# Patient Record
Sex: FEMALE | Race: WHITE | Employment: OTHER | URBAN - NONMETROPOLITAN AREA
[De-identification: names, ages, dates, MRNs, and addresses within clinical notes are randomized per-mention and may not be internally consistent; named-entity substitution may affect disease eponyms.]

---

## 2017-01-09 RX ORDER — ATORVASTATIN CALCIUM 40 MG/1
TABLET, FILM COATED ORAL
Qty: 90 TABLET | Refills: 3 | Status: SHIPPED | OUTPATIENT
Start: 2017-01-09 | End: 2018-01-04 | Stop reason: SDUPTHER

## 2017-04-18 ENCOUNTER — TELEPHONE (OUTPATIENT)
Dept: FAMILY MEDICINE CLINIC | Age: 77
End: 2017-04-18

## 2017-04-18 ENCOUNTER — OFFICE VISIT (OUTPATIENT)
Dept: FAMILY MEDICINE CLINIC | Age: 77
End: 2017-04-18

## 2017-04-18 VITALS
BODY MASS INDEX: 33.54 KG/M2 | WEIGHT: 159.8 LBS | HEIGHT: 58 IN | SYSTOLIC BLOOD PRESSURE: 112 MMHG | RESPIRATION RATE: 14 BRPM | HEART RATE: 52 BPM | TEMPERATURE: 98 F | DIASTOLIC BLOOD PRESSURE: 68 MMHG

## 2017-04-18 DIAGNOSIS — Z01.818 PRE-OP EXAM: Primary | ICD-10-CM

## 2017-04-18 DIAGNOSIS — R00.1 BRADYCARDIA: ICD-10-CM

## 2017-04-18 DIAGNOSIS — E03.8 OTHER SPECIFIED HYPOTHYROIDISM: ICD-10-CM

## 2017-04-18 DIAGNOSIS — M17.12 PRIMARY OSTEOARTHRITIS OF LEFT KNEE: ICD-10-CM

## 2017-04-18 DIAGNOSIS — E78.5 HYPERLIPIDEMIA, UNSPECIFIED HYPERLIPIDEMIA TYPE: ICD-10-CM

## 2017-04-18 PROCEDURE — 99213 OFFICE O/P EST LOW 20 MIN: CPT | Performed by: FAMILY MEDICINE

## 2017-04-18 PROCEDURE — 93000 ELECTROCARDIOGRAM COMPLETE: CPT | Performed by: FAMILY MEDICINE

## 2017-04-21 ENCOUNTER — TELEPHONE (OUTPATIENT)
Dept: FAMILY MEDICINE CLINIC | Age: 77
End: 2017-04-21

## 2017-04-21 LAB
ABSOLUTE BASO #: 0.1 K/UL (ref 0–0.1)
ABSOLUTE EOS #: 0.1 K/UL (ref 0.1–0.4)
ABSOLUTE LYMPH #: 2 K/UL (ref 0.8–5.2)
ABSOLUTE MONO #: 0.3 K/UL (ref 0.1–0.9)
ABSOLUTE NEUT #: 4.7 K/UL (ref 1.3–9.1)
ANION GAP SERPL CALCULATED.3IONS-SCNC: 16 MMOL/L
BASOPHILS RELATIVE PERCENT: 0.7 %
BUN BLDV-MCNC: 14 MG/DL (ref 9–24)
C-REACTIVE PROTEIN: 8.71 MG/L
CALCIUM SERPL-MCNC: 8.6 MG/DL (ref 8.7–10.8)
CHLORIDE BLD-SCNC: 105 MMOL/L (ref 95–111)
CO2: 20 MMOL/L (ref 21–32)
CREAT SERPL-MCNC: 0.8 MG/DL (ref 0.5–1.3)
EGFR AFRICAN AMERICAN: 85
EGFR IF NONAFRICAN AMERICAN: 70
EOSINOPHILS RELATIVE PERCENT: 1.8 %
GLUCOSE: 153 MG/DL (ref 70–100)
HCT VFR BLD CALC: 37 % (ref 36–48)
HEMOGLOBIN: 12.2 G/DL (ref 12–16)
LYMPHOCYTE %: 28.1 %
MCH RBC QN AUTO: 32.4 PG (ref 27–34)
MCHC RBC AUTO-ENTMCNC: 33 G/DL (ref 31–36)
MCV RBC AUTO: 98.1 FL (ref 80–100)
MONOCYTES # BLD: 4.4 %
NEUTROPHILS RELATIVE PERCENT: 64.6 %
PDW BLD-RTO: 13.1 % (ref 10.8–14.8)
PLATELETS: 323 K/UL (ref 150–450)
POTASSIUM SERPL-SCNC: 3.8 MMOL/L (ref 3.5–5.4)
RBC: 3.77 M/UL (ref 4–5.5)
SEDIMENTATION RATE, ERYTHROCYTE: 20 MM/HR (ref 0–20)
SODIUM BLD-SCNC: 137 MMOL/L (ref 134–147)
WBC: 7.3 K/UL (ref 3.7–10.8)

## 2017-04-24 ENCOUNTER — TELEPHONE (OUTPATIENT)
Dept: FAMILY MEDICINE CLINIC | Age: 77
End: 2017-04-24

## 2017-04-24 DIAGNOSIS — I10 ESSENTIAL HYPERTENSION: Primary | ICD-10-CM

## 2017-05-04 ENCOUNTER — TELEPHONE (OUTPATIENT)
Dept: FAMILY MEDICINE CLINIC | Age: 77
End: 2017-05-04

## 2017-07-18 DIAGNOSIS — R20.2 PARESTHESIAS: ICD-10-CM

## 2017-07-18 RX ORDER — PREGABALIN 75 MG/1
75 CAPSULE ORAL 3 TIMES DAILY
Qty: 90 CAPSULE | Status: CANCELLED | OUTPATIENT
Start: 2017-07-18

## 2017-07-18 RX ORDER — PREGABALIN 75 MG/1
75 CAPSULE ORAL 3 TIMES DAILY
Qty: 270 CAPSULE | Refills: 1 | Status: SHIPPED | OUTPATIENT
Start: 2017-07-18 | End: 2017-07-19 | Stop reason: SDUPTHER

## 2017-07-19 RX ORDER — PREGABALIN 75 MG/1
75 CAPSULE ORAL 3 TIMES DAILY
Qty: 90 CAPSULE | Refills: 0 | Status: SHIPPED | OUTPATIENT
Start: 2017-07-19 | End: 2017-08-01

## 2017-08-01 ENCOUNTER — TELEPHONE (OUTPATIENT)
Dept: FAMILY MEDICINE CLINIC | Age: 77
End: 2017-08-01

## 2017-08-01 DIAGNOSIS — M79.7 FIBROMYALGIA: Primary | ICD-10-CM

## 2017-08-01 RX ORDER — GABAPENTIN 300 MG/1
CAPSULE ORAL
Qty: 90 CAPSULE | Refills: 3 | Status: SHIPPED | OUTPATIENT
Start: 2017-08-01 | End: 2017-12-19 | Stop reason: SDUPTHER

## 2017-08-14 DIAGNOSIS — N39.41 URGE INCONTINENCE: ICD-10-CM

## 2017-08-14 RX ORDER — OXYBUTYNIN CHLORIDE 10 MG/1
TABLET, EXTENDED RELEASE ORAL
Qty: 90 TABLET | Refills: 3 | Status: SHIPPED | OUTPATIENT
Start: 2017-08-14 | End: 2017-10-06

## 2017-09-05 RX ORDER — POTASSIUM CHLORIDE 20 MEQ/1
TABLET, EXTENDED RELEASE ORAL
Qty: 90 TABLET | Refills: 2 | Status: SHIPPED | OUTPATIENT
Start: 2017-09-05 | End: 2017-10-06

## 2017-10-06 ENCOUNTER — HOSPITAL ENCOUNTER (EMERGENCY)
Age: 77
Discharge: HOME | End: 2017-10-06
Attending: EMERGENCY MEDICINE
Payer: MEDICARE

## 2017-10-06 VITALS
SYSTOLIC BLOOD PRESSURE: 167 MMHG | RESPIRATION RATE: 18 BRPM | OXYGEN SATURATION: 99 % | DIASTOLIC BLOOD PRESSURE: 71 MMHG | BODY MASS INDEX: 29.43 KG/M2 | TEMPERATURE: 97.8 F | HEIGHT: 59 IN | WEIGHT: 146 LBS | HEART RATE: 59 BPM

## 2017-10-06 DIAGNOSIS — N30.01 ACUTE CYSTITIS WITH HEMATURIA: Primary | ICD-10-CM

## 2017-10-06 LAB
BILIRUBIN URINE: NEGATIVE
BLOOD, URINE: ABNORMAL
CHARACTER, URINE: ABNORMAL
COLOR: YELLOW
GLUCOSE, URINE: NEGATIVE MG/DL
KETONES, URINE: NEGATIVE
LEUKOCYTES, UA: ABNORMAL
NITRATE, UA: POSITIVE
PH UA: 5.5 (ref 5–9)
PROTEIN UA: ABNORMAL MG/DL
REFLEX TO URINE C & S: ABNORMAL
SPECIFIC GRAVITY UA: 1.02 (ref 1–1.03)
UROBILINOGEN, URINE: 0.2 EU/DL (ref 0–1)

## 2017-10-06 PROCEDURE — 99213 OFFICE O/P EST LOW 20 MIN: CPT | Performed by: EMERGENCY MEDICINE

## 2017-10-06 PROCEDURE — 81003 URINALYSIS AUTO W/O SCOPE: CPT

## 2017-10-06 PROCEDURE — 87086 URINE CULTURE/COLONY COUNT: CPT

## 2017-10-06 PROCEDURE — 87186 SC STD MICRODIL/AGAR DIL: CPT

## 2017-10-06 PROCEDURE — 99213 OFFICE O/P EST LOW 20 MIN: CPT

## 2017-10-06 PROCEDURE — 87184 SC STD DISK METHOD PER PLATE: CPT

## 2017-10-06 PROCEDURE — 87077 CULTURE AEROBIC IDENTIFY: CPT

## 2017-10-06 RX ORDER — CIPROFLOXACIN 250 MG/1
250 TABLET, FILM COATED ORAL 2 TIMES DAILY
Qty: 10 TABLET | Refills: 0 | Status: SHIPPED | OUTPATIENT
Start: 2017-10-06 | End: 2017-10-11

## 2017-10-06 RX ORDER — PHENAZOPYRIDINE HYDROCHLORIDE 200 MG/1
200 TABLET, FILM COATED ORAL 3 TIMES DAILY PRN
Qty: 9 TABLET | Refills: 0 | Status: ON HOLD | OUTPATIENT
Start: 2017-10-06 | End: 2017-12-05

## 2017-10-06 ASSESSMENT — ENCOUNTER SYMPTOMS
EYE REDNESS: 0
TROUBLE SWALLOWING: 0
SHORTNESS OF BREATH: 0
DIARRHEA: 0
ABDOMINAL PAIN: 1
SINUS PRESSURE: 0
WHEEZING: 0
VOICE CHANGE: 0
EYE PAIN: 0
BACK PAIN: 0
COUGH: 0
EYE DISCHARGE: 0
VOMITING: 0
STRIDOR: 0
NAUSEA: 0
SORE THROAT: 0

## 2017-10-06 NOTE — ED TRIAGE NOTES
Patient to urgent care with burning with urination since yesterday. Denies N/V. Denies fevers or chills.

## 2017-10-06 NOTE — ED PROVIDER NOTES
[facility]  Urgent Care Encounter      CHIEF COMPLAINT       Chief Complaint   Patient presents with    Dysuria     since yesterday       Nurses Notes reviewed and I agree except as noted in the HPI. HISTORY OF PRESENT ILLNESS   Lexy Jesus is a 78 y.o. female who presents With 24-hour history of dysuria, urinary frequency, urgency, suprapubic pressure. Patient states symptoms are identical to previous cystitis. No fever, vomiting, hematuria, back pain, dizziness, syncope, altered mental status, lethargy, rash. No history of diabetes, pyelonephritis, urosepsis, AAA. Recent GFR of 70. REVIEW OF SYSTEMS     Review of Systems   Constitutional: Negative for appetite change, chills, fatigue, fever and unexpected weight change. HENT: Negative for congestion, ear discharge, ear pain, sinus pressure, sneezing, sore throat, trouble swallowing and voice change. Eyes: Negative for pain, discharge and redness. Respiratory: Negative for cough, shortness of breath, wheezing and stridor. Cardiovascular: Negative for chest pain and leg swelling. Gastrointestinal: Positive for abdominal pain. Negative for diarrhea, nausea and vomiting. Genitourinary: Positive for dysuria, frequency and urgency. Negative for hematuria. Musculoskeletal: Negative for arthralgias, back pain, myalgias and neck pain. Skin: Negative for rash. Neurological: Negative for dizziness, syncope, weakness and headaches. Hematological: Negative for adenopathy. Psychiatric/Behavioral: Negative for behavioral problems, confusion, sleep disturbance and suicidal ideas. The patient is not nervous/anxious. All other systems reviewed and are negative.       PAST MEDICAL HISTORY         Diagnosis Date    Anxiety     Arthritis     Bleeding disorder ([address]     CAD (coronary artery disease)     Stent in 1st diagonal in 2007    Cataract     Celiac disease     Dementia     Depression     Fibromyalgia     never smoked. She has never used smokeless tobacco. She reports that she does not drink alcohol or use illicit drugs. PHYSICAL EXAM     ED TRIAGE VITALS  BP: (!) 167/71, Temp: 97.8 °F (36.6 °C), Pulse: 59, Resp: 18, SpO2: 99 %  Physical Exam   Constitutional: She is oriented to person, place, and time. She appears well-developed and well-nourished. No distress. Moist membranes, normal airway   HENT:   Head: Normocephalic and atraumatic. Right Ear: Tympanic membrane and external ear normal.   Left Ear: Tympanic membrane and external ear normal.   Nose: Nose normal.   Mouth/Throat: Oropharynx is clear and moist. No uvula swelling. No oropharyngeal exudate, posterior oropharyngeal edema, posterior oropharyngeal erythema or tonsillar abscesses. Eyes: Conjunctivae and EOM are normal. Pupils are equal, round, and reactive to light. Right eye exhibits no discharge. Left eye exhibits no discharge. No scleral icterus. Neck: Normal range of motion. No JVD present. No thyromegaly present. No meningismus   Cardiovascular: Normal rate, regular rhythm, S1 normal, S2 normal, normal heart sounds, intact distal pulses and normal pulses. Exam reveals no gallop and no friction rub. No murmur heard. Pulmonary/Chest: Effort normal and breath sounds normal. No stridor. No tachypnea. No respiratory distress. She has no decreased breath sounds. She has no wheezes. She has no rhonchi. She has no rales. She exhibits no tenderness. No cough, lungs clear   Abdominal: Soft. Bowel sounds are normal. She exhibits no distension, no abdominal bruit, no pulsatile midline mass and no mass. There is no tenderness. There is no rebound, no guarding and no CVA tenderness. Musculoskeletal: Normal range of motion. She exhibits no edema or tenderness. Right lower leg: Normal.        Left lower leg: Normal.   Lymphadenopathy:     She has cervical adenopathy. Right cervical: Superficial cervical adenopathy present. Patient to follow-up with PCP in 4 days if problems persist, and she is to call PCP office in 4 days for culture results even if improved. She understands to go to ED if worse. PATIENT REFERRED TO:  DO Tim Zeng   [phone]    Schedule an appointment as soon as possible for a visit in 4 days  Recheck in office if problems persist, call for culture results in 4 days.   Go to emergency if worse    DISCHARGE MEDICATIONS:  Discharge Medication List as of 10/6/2017  1:58 PM      START taking these medications    Details   ciprofloxacin (CIPRO) 250 MG tablet Take 1 tablet by mouth 2 times daily for 5 days, Disp-10 tablet, R-0Print      phenazopyridine (PYRIDIUM) 200 MG tablet Take 1 tablet by mouth 3 times daily as needed for Pain, Disp-9 tablet, R-0Print           Discharge Medication List as of 10/6/2017  1:58 PM          MD Selam Anderson MD  10/06/17 8174

## 2017-10-06 NOTE — ED AVS SNAPSHOT
After Visit Summary  (Discharge Instructions)    Medication List for Home    Based on the information you provided to us as well as any changes during this visit, the following is your updated medication list.  Compare this with your prescription bottles at home. If you have any questions or concerns, contact your primary care physician's office. Daily Medication List (This medication list can be shared with any Healthcare provider who is helping you manage your medications)      There are NEW medications for you. START taking them after you leave the hospital     ciprofloxacin 250 MG tablet   Commonly known as:  CIPRO   Take 1 tablet by mouth 2 times daily for 5 days       phenazopyridine 200 MG tablet   Commonly known as:  PYRIDIUM   Take 1 tablet by mouth 3 times daily as needed for Pain         ASK your doctor about these medications if you have questions     Acetaminophen 650 MG Tabs   Take 650 mg by mouth every 4 hours as needed. aspirin 81 MG EC tablet   Take 81 mg by mouth daily. atorvastatin 40 MG tablet   Commonly known as:  LIPITOR   TAKE 1 TABLET NIGHTLY       dicyclomine 10 MG capsule   Commonly known as:  BENTYL   Take 10 mg by mouth 4 times daily (before meals and nightly)       escitalopram 20 MG tablet   Commonly known as:  LEXAPRO   Take 1 tablet by mouth daily       fluticasone 50 MCG/ACT nasal spray   Commonly known as:  FLONASE   1 spray by Nasal route daily       gabapentin 300 MG capsule   Commonly known as:  NEURONTIN   Take 1 tab PO qhs x 1 week, then take 1 tab PO BID x 1 week, then take 1 tab PO TID.        HYDROcodone-acetaminophen 5-325 MG per tablet   Commonly known as:  NORCO   Take 1 tablet by mouth every 6 hours as needed for Pain       hydrocortisone 1 % cream   APPLY TOPICALLY TO THE AFFECTED AREA TWICE DAILY       levothyroxine 25 MCG tablet   Commonly known as:  SYNTHROID   TAKE 1 TABLET DAILY       oxybutynin 10 MG extended release tablet 3. Enter your [employer] Access Code exactly as it appears below. You will not need to use this code after youve completed the sign-up process. If you do not sign up before the expiration date, you must request a new code. [employer] Access Code: [email or web address]  Expires: 12/5/2017  1:57 PM    4. Enter your Social Security Number ([id number]) and Date of Birth (mm/dd/yyyy) as indicated and click Submit. You will be taken to the next sign-up page. 5. Create a [employer] ID. This will be your [employer] login ID and cannot be changed, so think of one that is secure and easy to remember. 6. Create a [employer] password. You can change your password at any time. 7. Enter your Password Reset Question and Answer. This can be used at a later time if you forget your password. 8. Enter your e-mail address. You will receive e-mail notification when new information is available in [address] 9. Click Sign Up. You can now view your medical record. Additional Information  If you have questions, please contact the physician practice where you receive care. Remember, [employer] is NOT to be used for urgent needs. For medical emergencies, dial 911. For questions regarding your [employer] account call [phone]. If you have a clinical question, please call your doctor's office. View your information online  ? Review your current list of  medications, immunization, and allergies. ? Review your future test results online . ? Review your discharge instructions provided by your caregivers at discharge    Certain functionality such as prescription refills, scheduling appointments or sending messages to your provider are not activated if your provider does not use [employer] in his/her office    For questions regarding your [employer] account call [phone]. If you have a clinical question, please call your doctor's office.          The information on all pages of the After Visit Summary has been reviewed with me, the patient and/or responsible adult, by my health care provider(s). I had the opportunity to ask questions regarding this information. I understand I should dispose of my armband safely at home to protect my health information. A complete copy of the After Visit Summary has been given to me, the patient and/or responsible adult. Patient Signature/Responsible Adult: ___________________________________    Nurse Signature: ___________________________________  Resident/MLP Signature: ___________________________________  Attending Signature: ___________________________________    Date:____________Time:____________              Discharge Instructions            Urinary Tract Infection in Women: Care Instructions  Your Care Instructions    A urinary tract infection, or UTI, is a general term for an infection anywhere between the kidneys and the urethra (where urine comes out). Most UTIs are bladder infections. They often cause pain or burning when you urinate. UTIs are caused by bacteria and can be cured with antibiotics. Be sure to complete your treatment so that the infection goes away. Follow-up care is a key part of your treatment and safety. Be sure to make and go to all appointments, and call your doctor if you are having problems. It's also a good idea to know your test results and keep a list of the medicines you take. How can you care for yourself at home? · Take your antibiotics as directed. Do not stop taking them just because you feel better. You need to take the full course of antibiotics. · Drink extra water and other fluids for the next day or two. This may help wash out the bacteria that are causing the infection. (If you have kidney, heart, or liver disease and have to limit fluids, talk with your doctor before you increase your fluid intake.)  · Avoid drinks that are carbonated or have caffeine. They can irritate the bladder. · Urinate often. Try to empty your bladder each time.

## 2017-10-08 LAB
ORGANISM: ABNORMAL
URINE CULTURE REFLEX: ABNORMAL

## 2017-10-10 ENCOUNTER — OFFICE VISIT (OUTPATIENT)
Dept: FAMILY MEDICINE CLINIC | Age: 77
End: 2017-10-10
Payer: MEDICARE

## 2017-10-10 VITALS
HEART RATE: 59 BPM | RESPIRATION RATE: 12 BRPM | BODY MASS INDEX: 29.31 KG/M2 | HEIGHT: 59 IN | WEIGHT: 145.4 LBS | SYSTOLIC BLOOD PRESSURE: 138 MMHG | TEMPERATURE: 98.3 F | DIASTOLIC BLOOD PRESSURE: 74 MMHG

## 2017-10-10 DIAGNOSIS — R30.0 DYSURIA: Primary | ICD-10-CM

## 2017-10-10 PROCEDURE — 99213 OFFICE O/P EST LOW 20 MIN: CPT | Performed by: FAMILY MEDICINE

## 2017-10-10 RX ORDER — SULFAMETHOXAZOLE AND TRIMETHOPRIM 800; 160 MG/1; MG/1
1 TABLET ORAL 2 TIMES DAILY
Qty: 10 TABLET | Refills: 0 | Status: SHIPPED | OUTPATIENT
Start: 2017-10-10 | End: 2017-10-15

## 2017-10-10 ASSESSMENT — PATIENT HEALTH QUESTIONNAIRE - PHQ9
2. FEELING DOWN, DEPRESSED OR HOPELESS: 0
SUM OF ALL RESPONSES TO PHQ QUESTIONS 1-9: 0
1. LITTLE INTEREST OR PLEASURE IN DOING THINGS: 0
SUM OF ALL RESPONSES TO PHQ9 QUESTIONS 1 & 2: 0

## 2017-11-25 ENCOUNTER — HOSPITAL ENCOUNTER (EMERGENCY)
Age: 77
Discharge: HOME | End: 2017-11-26
Payer: MEDICARE

## 2017-11-25 DIAGNOSIS — N39.0 URINARY TRACT INFECTION WITH HEMATURIA, SITE UNSPECIFIED: Primary | ICD-10-CM

## 2017-11-25 DIAGNOSIS — R31.9 URINARY TRACT INFECTION WITH HEMATURIA, SITE UNSPECIFIED: Primary | ICD-10-CM

## 2017-11-25 LAB
BACTERIA: ABNORMAL /HPF
BILIRUBIN URINE: NEGATIVE
BLOOD, URINE: ABNORMAL
CASTS 2: ABNORMAL /LPF
CASTS UA: ABNORMAL /LPF
CHARACTER, URINE: ABNORMAL
COLOR: YELLOW
CRYSTALS, UA: ABNORMAL
EPITHELIAL CELLS, UA: ABNORMAL /HPF
GLUCOSE URINE: NEGATIVE MG/DL
KETONES, URINE: NEGATIVE
LEUKOCYTE ESTERASE, URINE: ABNORMAL
MISCELLANEOUS 2: ABNORMAL
NITRITE, URINE: NEGATIVE
PH UA: 5.5
PROTEIN UA: ABNORMAL
RBC URINE: ABNORMAL /HPF
RENAL EPITHELIAL, UA: ABNORMAL
SPECIFIC GRAVITY, URINE: 1.01 (ref 1–1.03)
UROBILINOGEN, URINE: 0.2 EU/DL
WBC UA: > 200 /HPF
YEAST: ABNORMAL

## 2017-11-25 PROCEDURE — 87184 SC STD DISK METHOD PER PLATE: CPT

## 2017-11-25 PROCEDURE — 81001 URINALYSIS AUTO W/SCOPE: CPT

## 2017-11-25 PROCEDURE — 87186 SC STD MICRODIL/AGAR DIL: CPT

## 2017-11-25 PROCEDURE — 99283 EMERGENCY DEPT VISIT LOW MDM: CPT

## 2017-11-25 PROCEDURE — 87077 CULTURE AEROBIC IDENTIFY: CPT

## 2017-11-25 PROCEDURE — 87086 URINE CULTURE/COLONY COUNT: CPT

## 2017-11-25 ASSESSMENT — PAIN DESCRIPTION - LOCATION: LOCATION: PERINEUM

## 2017-11-25 ASSESSMENT — PAIN DESCRIPTION - ORIENTATION: ORIENTATION: LOWER

## 2017-11-25 ASSESSMENT — PAIN DESCRIPTION - DESCRIPTORS: DESCRIPTORS: BURNING;PRESSURE

## 2017-11-26 VITALS
TEMPERATURE: 98 F | HEART RATE: 66 BPM | SYSTOLIC BLOOD PRESSURE: 148 MMHG | RESPIRATION RATE: 18 BRPM | HEIGHT: 59 IN | DIASTOLIC BLOOD PRESSURE: 76 MMHG | BODY MASS INDEX: 31.04 KG/M2 | WEIGHT: 154 LBS | OXYGEN SATURATION: 97 %

## 2017-11-26 RX ORDER — PHENAZOPYRIDINE HYDROCHLORIDE 100 MG/1
100 TABLET, FILM COATED ORAL 3 TIMES DAILY PRN
Qty: 9 TABLET | Refills: 0 | Status: SHIPPED | OUTPATIENT
Start: 2017-11-26 | End: 2017-11-29

## 2017-11-26 RX ORDER — SULFAMETHOXAZOLE AND TRIMETHOPRIM 800; 160 MG/1; MG/1
1 TABLET ORAL 2 TIMES DAILY
Qty: 20 TABLET | Refills: 0 | Status: ON HOLD | OUTPATIENT
Start: 2017-11-26 | End: 2017-12-05 | Stop reason: HOSPADM

## 2017-11-26 ASSESSMENT — ENCOUNTER SYMPTOMS
NAUSEA: 0
SORE THROAT: 0
RHINORRHEA: 0
VOMITING: 0
WHEEZING: 0
SHORTNESS OF BREATH: 0
EYE PAIN: 0
BACK PAIN: 0
COUGH: 0
DIARRHEA: 0
EYE DISCHARGE: 0
ABDOMINAL PAIN: 0

## 2017-11-26 NOTE — ED PROVIDER NOTES
[facility]  eMERGENCY dEPARTMENT eNCOUnter          CHIEF COMPLAINT       Chief Complaint   Patient presents with    Urinary Tract Infection       Nurses Notes reviewed and I agree except as noted in the HPI. HISTORY OF PRESENT ILLNESS    Lexy Jesus is a 78 y.o. female who presents to the Emergency Department for the evaluation of dysuria. The patient states that she was put on antibiotics about a week and a half ago by her PCP , DO for a UTI. She reports that she finished her antibiotics and the symptoms completely subsided, and then returned about 3-4 days ago. She states that she has been experiencing dysuria and urgency that progressively got worse. She describes it as a constant pressure and burning pain in character when she urinates. She states that she has been increasing her fluid intake and still feels like she can't urinate. She denies hematuria, back pain, vaginal discharge, fever, nausea, or any other symptoms at this time. The patient has medical history of CAD, fibromyalgia, MI, urinary incontinence, and hypertension. Location/Symptom: dysuria   Timing/Onset: about 3-4 days ago  Context/Setting: at home  Quality: pressure and burning   Duration: cosntant  Modifying Factors: NA  Severity: denies any pain     The HPI was provided by the patient. REVIEW OF SYSTEMS     Review of Systems   Constitutional: Negative for appetite change, chills, fatigue and fever. HENT: Negative for congestion, ear pain, rhinorrhea and sore throat. Eyes: Negative for pain, discharge and visual disturbance. Respiratory: Negative for cough, shortness of breath and wheezing. Cardiovascular: Negative for chest pain, palpitations and leg swelling. Gastrointestinal: Negative for abdominal pain, diarrhea, nausea and vomiting. Genitourinary: Positive for dysuria and urgency. Negative for difficulty urinating and vaginal discharge.    Musculoskeletal: Negative for MG TABS Take 650 mg by mouth every 4 hours as needed. Qty: 120 tablet, Refills: 3      rivastigmine (EXELON) 4.6 MG/24HR Place 1 patch onto the skin daily. aspirin 81 MG EC tablet Take 81 mg by mouth daily. phenazopyridine (PYRIDIUM) 200 MG tablet Take 1 tablet by mouth 3 times daily as needed for Pain  Qty: 9 tablet, Refills: 0    Associated Diagnoses: Acute cystitis with hematuria      raloxifene (EVISTA) 60 MG tablet TAKE 1 TABLET DAILY  Qty: 90 tablet, Refills: 3             ALLERGIES     is allergic to iodides; iodine; morphine; motrin [ibuprofen micronized]; and percocet [oxycodone-acetaminophen]. FAMILY HISTORY     indicated that her mother is . She indicated that her father is . She indicated that her maternal grandmother is . She indicated that her maternal grandfather is . She indicated that her paternal grandmother is . She indicated that her paternal grandfather is . family history includes Asthma in her father; Cancer in her mother. SOCIAL HISTORY      reports that she has never smoked. She has never used smokeless tobacco. She reports that she does not drink alcohol or use drugs. PHYSICAL EXAM     INITIAL VITALS:  height is 4' 11\" (1.499 m) and weight is 154 lb (69.9 kg). Her oral temperature is 98 °F (36.7 °C). Her blood pressure is 148/76 (abnormal) and her pulse is 66. Her oxygen saturation is 97%. Physical Exam   Constitutional: She is oriented to person, place, and time. She appears well-developed and well-nourished. HENT:   Head: Normocephalic and atraumatic. Right Ear: External ear normal.   Left Ear: External ear normal.   Eyes: Conjunctivae are normal. Right eye exhibits no discharge. Left eye exhibits no discharge. No scleral icterus. Neck: Normal range of motion. Neck supple. No JVD present. Pulmonary/Chest: Effort normal. No stridor. No respiratory distress. Abdominal: Soft. She exhibits no distension. There is no tenderness. There is no rebound and no guarding. Musculoskeletal: Normal range of motion. She exhibits no edema or tenderness. Neurological: She is alert and oriented to person, place, and time. She exhibits normal muscle tone. GCS eye subscore is 4. GCS verbal subscore is 5. GCS motor subscore is 6. Skin: Skin is warm and dry. She is not diaphoretic. No erythema. Psychiatric: She has a normal mood and affect. Her behavior is normal.   Nursing note and vitals reviewed. DIFFERENTIAL DIAGNOSIS:   UTI, pyelonephritis, cystitis     DIAGNOSTIC RESULTS     EKG: All EKG's are interpreted by the Emergency Department Physician who either signs or Co-signs this chart in the absence of a cardiologist.    None    RADIOLOGY: non-plain film images(s) such as CT, Ultrasound and MRI are read by the radiologist.    None    LABS:     Labs Reviewed   URINE WITH REFLEXED MICRO - Abnormal; Notable for the following:        Result Value    Blood, Urine SMALL (*)     Protein, UA TRACE (*)     Leukocyte Esterase, Urine LARGE (*)     Character, Urine TURBID (*)     All other components within normal limits   URINE CULTURE, REFLEXED    Narrative:     Source: urine, clean catch       Site: clean void          Current Antibiotics: none       EMERGENCY DEPARTMENT COURSE:   Vitals:    Vitals:    11/25/17 2223   BP: (!) 148/76   Pulse: 66   Temp: 98 °F (36.7 °C)   TempSrc: Oral   SpO2: 97%   Weight: 154 lb (69.9 kg)   Height: 4' 11\" (1.499 m)       12:02 AM: The patient was seen and evaluated. MDM:  The patient was seen and evaluated within the ED today following a UTI. Within the department, I observed the patient's vital signs to be hypertensive. Patient states that she was placed on antibotics for a UTI by her PCP about a week and a half ago. She states that her symptoms subsided completely and then returned. On exam, I appreciated a normal exam. Radiological studies within the department were not indicated.

## 2017-11-28 LAB
ORGANISM: ABNORMAL
URINE CULTURE REFLEX: ABNORMAL
URINE CULTURE REFLEX: ABNORMAL

## 2017-11-29 ENCOUNTER — TELEPHONE (OUTPATIENT)
Dept: PHARMACY | Age: 77
End: 2017-11-29

## 2017-11-29 RX ORDER — CIPROFLOXACIN 500 MG/1
500 TABLET, FILM COATED ORAL 2 TIMES DAILY
Qty: 20 TABLET | Refills: 0 | Status: SHIPPED | OUTPATIENT
Start: 2017-11-29 | End: 2017-12-09

## 2017-12-02 ENCOUNTER — APPOINTMENT (OUTPATIENT)
Dept: GENERAL RADIOLOGY | Age: 77
DRG: 689 | End: 2017-12-02
Payer: MEDICARE

## 2017-12-02 ENCOUNTER — HOSPITAL ENCOUNTER (INPATIENT)
Age: 77
LOS: 2 days | Discharge: HOME | DRG: 689 | End: 2017-12-05
Attending: FAMILY MEDICINE | Admitting: INTERNAL MEDICINE
Payer: MEDICARE

## 2017-12-02 DIAGNOSIS — G30.9 ALZHEIMER'S DEMENTIA WITHOUT BEHAVIORAL DISTURBANCE, UNSPECIFIED TIMING OF DEMENTIA ONSET: Primary | ICD-10-CM

## 2017-12-02 DIAGNOSIS — R30.0 DYSURIA: ICD-10-CM

## 2017-12-02 DIAGNOSIS — N30.01 ACUTE CYSTITIS WITH HEMATURIA: ICD-10-CM

## 2017-12-02 DIAGNOSIS — F02.80 ALZHEIMER'S DEMENTIA WITHOUT BEHAVIORAL DISTURBANCE, UNSPECIFIED TIMING OF DEMENTIA ONSET: Primary | ICD-10-CM

## 2017-12-02 LAB
ALBUMIN SERPL-MCNC: 4 G/DL (ref 3.5–5.1)
ALP BLD-CCNC: 94 U/L (ref 38–126)
ALT SERPL-CCNC: 10 U/L (ref 11–66)
ANION GAP SERPL CALCULATED.3IONS-SCNC: 17 MEQ/L (ref 8–16)
AST SERPL-CCNC: 22 U/L (ref 5–40)
BACTERIA: NORMAL
BILIRUB SERPL-MCNC: 0.6 MG/DL (ref 0.3–1.2)
BILIRUBIN URINE: NEGATIVE
BLOOD, URINE: NEGATIVE
BUN BLDV-MCNC: 13 MG/DL (ref 7–22)
CALCIUM SERPL-MCNC: 8.3 MG/DL (ref 8.5–10.5)
CASTS: NORMAL /LPF
CASTS: NORMAL /LPF
CHARACTER, URINE: CLEAR
CHLORIDE BLD-SCNC: 87 MEQ/L (ref 98–111)
CO2: 20 MEQ/L (ref 23–33)
COLOR: YELLOW
CREAT SERPL-MCNC: 1 MG/DL (ref 0.4–1.2)
CRYSTALS: NORMAL
EPITHELIAL CELLS, UA: NORMAL /HPF
GFR SERPL CREATININE-BSD FRML MDRD: 54 ML/MIN/1.73M2
GLUCOSE BLD-MCNC: 97 MG/DL (ref 70–108)
GLUCOSE, URINE: NEGATIVE MG/DL
KETONES, URINE: NEGATIVE
LACTIC ACID: 2.3 MMOL/L (ref 0.5–2.2)
LEUKOCYTE ESTERASE, URINE: NEGATIVE
MISCELLANEOUS LAB TEST RESULT: NORMAL
NITRITE, URINE: NEGATIVE
OSMOLALITY CALCULATION: 249.7 MOSMOL/KG (ref 275–300)
PH UA: 7
POTASSIUM SERPL-SCNC: 3.9 MEQ/L (ref 3.5–5.2)
PROTEIN UA: NEGATIVE MG/DL
RBC URINE: NORMAL /HPF
RENAL EPITHELIAL, UA: NORMAL
SODIUM BLD-SCNC: 124 MEQ/L (ref 135–145)
SPECIFIC GRAVITY UA: < 1.005 (ref 1–1.03)
TOTAL PROTEIN: 6.9 G/DL (ref 6.1–8)
UROBILINOGEN, URINE: 0.2 EU/DL
WBC UA: NORMAL /HPF
YEAST: NORMAL

## 2017-12-02 PROCEDURE — 80053 COMPREHEN METABOLIC PANEL: CPT

## 2017-12-02 PROCEDURE — 85025 COMPLETE CBC W/AUTO DIFF WBC: CPT

## 2017-12-02 PROCEDURE — 99284 EMERGENCY DEPT VISIT MOD MDM: CPT

## 2017-12-02 PROCEDURE — 83605 ASSAY OF LACTIC ACID: CPT

## 2017-12-02 PROCEDURE — 71020 XR CHEST STANDARD TWO VW: CPT

## 2017-12-02 PROCEDURE — 87086 URINE CULTURE/COLONY COUNT: CPT

## 2017-12-02 PROCEDURE — 87040 BLOOD CULTURE FOR BACTERIA: CPT

## 2017-12-02 PROCEDURE — 36415 COLL VENOUS BLD VENIPUNCTURE: CPT

## 2017-12-02 PROCEDURE — 96372 THER/PROPH/DIAG INJ SC/IM: CPT

## 2017-12-02 PROCEDURE — 6360000002 HC RX W HCPCS: Performed by: FAMILY MEDICINE

## 2017-12-02 PROCEDURE — 81001 URINALYSIS AUTO W/SCOPE: CPT

## 2017-12-02 PROCEDURE — 84145 PROCALCITONIN (PCT): CPT

## 2017-12-02 RX ORDER — CEFTRIAXONE 1 G/1
1 INJECTION, POWDER, FOR SOLUTION INTRAMUSCULAR; INTRAVENOUS ONCE
Status: COMPLETED | OUTPATIENT
Start: 2017-12-02 | End: 2017-12-02

## 2017-12-02 RX ORDER — LIDOCAINE HYDROCHLORIDE 10 MG/ML
INJECTION, SOLUTION INFILTRATION; PERINEURAL
Status: DISPENSED
Start: 2017-12-02 | End: 2017-12-03

## 2017-12-02 RX ADMIN — CEFTRIAXONE 1 G: 1 INJECTION, POWDER, FOR SOLUTION INTRAMUSCULAR; INTRAVENOUS at 23:36

## 2017-12-02 ASSESSMENT — PAIN DESCRIPTION - DESCRIPTORS: DESCRIPTORS: BURNING

## 2017-12-02 ASSESSMENT — ENCOUNTER SYMPTOMS
WHEEZING: 0
EYE PAIN: 0
VOMITING: 0
NAUSEA: 0
DIARRHEA: 0
EYE DISCHARGE: 0
SORE THROAT: 0
RHINORRHEA: 0
BACK PAIN: 1
SHORTNESS OF BREATH: 0
COUGH: 0
ABDOMINAL PAIN: 0

## 2017-12-02 ASSESSMENT — PAIN SCALES - GENERAL: PAINLEVEL_OUTOF10: 10

## 2017-12-02 ASSESSMENT — PAIN DESCRIPTION - PAIN TYPE: TYPE: ACUTE PAIN

## 2017-12-03 PROBLEM — N39.0 UTI (URINARY TRACT INFECTION): Status: ACTIVE | Noted: 2017-12-03

## 2017-12-03 LAB
ANISOCYTOSIS: ABNORMAL
BASOPHILIA: SLIGHT
BASOPHILS # BLD: 0.1 %
BASOPHILS ABSOLUTE: 0 THOU/MM3 (ref 0–0.1)
CREATININE URINE: 62.1 MG/DL
EOSINOPHIL # BLD: 0.1 %
EOSINOPHILS ABSOLUTE: 0 THOU/MM3 (ref 0–0.4)
HCT VFR BLD CALC: 31.9 % (ref 37–47)
HCT VFR BLD CALC: 32.1 % (ref 37–47)
HEMOGLOBIN: 10.4 GM/DL (ref 12–16)
HEMOGLOBIN: 10.6 GM/DL (ref 12–16)
LACTIC ACID: 0.8 MMOL/L (ref 0.5–2.2)
LACTIC ACID: 1.5 MMOL/L (ref 0.5–2.2)
LYMPHOCYTES # BLD: 6.4 %
LYMPHOCYTES ABSOLUTE: 0.8 THOU/MM3 (ref 1–4.8)
MACROCYTES: ABNORMAL
MCH RBC QN AUTO: 35.2 PG (ref 27–31)
MCH RBC QN AUTO: 35.5 PG (ref 27–31)
MCHC RBC AUTO-ENTMCNC: 32.5 GM/DL (ref 33–37)
MCHC RBC AUTO-ENTMCNC: 33 GM/DL (ref 33–37)
MCV RBC AUTO: 107.6 FL (ref 81–99)
MCV RBC AUTO: 108.2 FL (ref 81–99)
MONOCYTES # BLD: 0.2 %
MONOCYTES ABSOLUTE: 0 THOU/MM3 (ref 0.4–1.3)
NUCLEATED RED BLOOD CELLS: 0 /100 WBC
OSMOLALITY URINE: 234 MOSMOL/KG (ref 250–750)
OSMOLALITY: 273 MOSMOL/KG (ref 275–295)
PDW BLD-RTO: 19.3 % (ref 11.5–14.5)
PDW BLD-RTO: 20.6 % (ref 11.5–14.5)
PLATELET # BLD: 284 THOU/MM3 (ref 130–400)
PLATELET # BLD: 362 THOU/MM3 (ref 130–400)
PLATELET ESTIMATE: ADEQUATE
PMV BLD AUTO: 7.3 MCM (ref 7.4–10.4)
PMV BLD AUTO: 7.4 MCM (ref 7.4–10.4)
POIKILOCYTES: SLIGHT
PROCALCITONIN: 0.06 NG/ML (ref 0.01–0.09)
RBC # BLD: 2.94 MILL/MM3 (ref 4.2–5.4)
RBC # BLD: 2.98 MILL/MM3 (ref 4.2–5.4)
SCAN OF BLOOD SMEAR: NORMAL
SEG NEUTROPHILS: 93.2 %
SEGMENTED NEUTROPHILS ABSOLUTE COUNT: 11.3 THOU/MM3 (ref 1.8–7.7)
SODIUM URINE: < 20 MEQ/L
SPHEROCYTES: ABNORMAL
TSH SERPL DL<=0.05 MIU/L-ACNC: 1.67 UIU/ML (ref 0.4–4.2)
WBC # BLD: 12.1 THOU/MM3 (ref 4.8–10.8)
WBC # BLD: 7.5 THOU/MM3 (ref 4.8–10.8)

## 2017-12-03 PROCEDURE — 85027 COMPLETE CBC AUTOMATED: CPT

## 2017-12-03 PROCEDURE — 84300 ASSAY OF URINE SODIUM: CPT

## 2017-12-03 PROCEDURE — 6370000000 HC RX 637 (ALT 250 FOR IP): Performed by: FAMILY MEDICINE

## 2017-12-03 PROCEDURE — 36415 COLL VENOUS BLD VENIPUNCTURE: CPT

## 2017-12-03 PROCEDURE — 6370000000 HC RX 637 (ALT 250 FOR IP): Performed by: INTERNAL MEDICINE

## 2017-12-03 PROCEDURE — 83605 ASSAY OF LACTIC ACID: CPT

## 2017-12-03 PROCEDURE — 6360000002 HC RX W HCPCS: Performed by: INTERNAL MEDICINE

## 2017-12-03 PROCEDURE — 84443 ASSAY THYROID STIM HORMONE: CPT

## 2017-12-03 PROCEDURE — 1200000000 HC SEMI PRIVATE

## 2017-12-03 PROCEDURE — 82570 ASSAY OF URINE CREATININE: CPT

## 2017-12-03 PROCEDURE — 83930 ASSAY OF BLOOD OSMOLALITY: CPT

## 2017-12-03 PROCEDURE — A6198 ALGINATE DRESSING > 48 SQ IN: HCPCS

## 2017-12-03 PROCEDURE — 2580000003 HC RX 258: Performed by: INTERNAL MEDICINE

## 2017-12-03 PROCEDURE — 83935 ASSAY OF URINE OSMOLALITY: CPT

## 2017-12-03 RX ORDER — SODIUM CHLORIDE 9 MG/ML
INJECTION, SOLUTION INTRAVENOUS CONTINUOUS
Status: DISCONTINUED | OUTPATIENT
Start: 2017-12-03 | End: 2017-12-04

## 2017-12-03 RX ORDER — ACETAMINOPHEN 325 MG/1
650 TABLET ORAL EVERY 4 HOURS PRN
Status: DISCONTINUED | OUTPATIENT
Start: 2017-12-03 | End: 2017-12-03 | Stop reason: HOSPADM

## 2017-12-03 RX ORDER — TRAMADOL HYDROCHLORIDE 50 MG/1
100 TABLET ORAL ONCE
Status: COMPLETED | OUTPATIENT
Start: 2017-12-03 | End: 2017-12-03

## 2017-12-03 RX ORDER — TRAZODONE HYDROCHLORIDE 100 MG/1
100 TABLET ORAL NIGHTLY
Status: DISCONTINUED | OUTPATIENT
Start: 2017-12-03 | End: 2017-12-05 | Stop reason: HOSPADM

## 2017-12-03 RX ORDER — LEVOTHYROXINE SODIUM 0.03 MG/1
25 TABLET ORAL DAILY
Status: DISCONTINUED | OUTPATIENT
Start: 2017-12-03 | End: 2017-12-05 | Stop reason: HOSPADM

## 2017-12-03 RX ORDER — ACETAMINOPHEN 325 MG/1
650 TABLET ORAL EVERY 4 HOURS PRN
Status: DISCONTINUED | OUTPATIENT
Start: 2017-12-03 | End: 2017-12-05 | Stop reason: HOSPADM

## 2017-12-03 RX ORDER — ATORVASTATIN CALCIUM 40 MG/1
40 TABLET, FILM COATED ORAL NIGHTLY
Status: DISCONTINUED | OUTPATIENT
Start: 2017-12-03 | End: 2017-12-05 | Stop reason: HOSPADM

## 2017-12-03 RX ORDER — ASPIRIN 81 MG/1
81 TABLET ORAL DAILY
Status: DISCONTINUED | OUTPATIENT
Start: 2017-12-03 | End: 2017-12-05 | Stop reason: HOSPADM

## 2017-12-03 RX ORDER — RIVASTIGMINE 4.6 MG/24H
1 PATCH, EXTENDED RELEASE TRANSDERMAL DAILY
Status: DISCONTINUED | OUTPATIENT
Start: 2017-12-03 | End: 2017-12-05 | Stop reason: HOSPADM

## 2017-12-03 RX ORDER — ESCITALOPRAM OXALATE 20 MG/1
20 TABLET ORAL DAILY
Status: DISCONTINUED | OUTPATIENT
Start: 2017-12-03 | End: 2017-12-05 | Stop reason: HOSPADM

## 2017-12-03 RX ORDER — PHENAZOPYRIDINE HYDROCHLORIDE 200 MG/1
200 TABLET, FILM COATED ORAL 2 TIMES DAILY
Status: DISCONTINUED | OUTPATIENT
Start: 2017-12-03 | End: 2017-12-05 | Stop reason: HOSPADM

## 2017-12-03 RX ORDER — RALOXIFENE HYDROCHLORIDE 60 MG/1
1 TABLET, FILM COATED ORAL DAILY
Status: DISCONTINUED | OUTPATIENT
Start: 2017-12-03 | End: 2017-12-03 | Stop reason: SDUPTHER

## 2017-12-03 RX ORDER — GABAPENTIN 300 MG/1
300 CAPSULE ORAL 3 TIMES DAILY
Status: DISCONTINUED | OUTPATIENT
Start: 2017-12-03 | End: 2017-12-05 | Stop reason: HOSPADM

## 2017-12-03 RX ADMIN — ATORVASTATIN CALCIUM 40 MG: 40 TABLET, FILM COATED ORAL at 20:00

## 2017-12-03 RX ADMIN — SODIUM CHLORIDE: 9 INJECTION, SOLUTION INTRAVENOUS at 17:34

## 2017-12-03 RX ADMIN — GABAPENTIN 300 MG: 300 CAPSULE ORAL at 10:00

## 2017-12-03 RX ADMIN — ENOXAPARIN SODIUM 40 MG: 40 INJECTION SUBCUTANEOUS at 11:36

## 2017-12-03 RX ADMIN — LEVOTHYROXINE SODIUM 25 MCG: 25 TABLET ORAL at 06:01

## 2017-12-03 RX ADMIN — PHENAZOPYRIDINE HYDROCHLORIDE 200 MG: 200 TABLET ORAL at 16:27

## 2017-12-03 RX ADMIN — WATER 1 G: 1 INJECTION INTRAMUSCULAR; INTRAVENOUS; SUBCUTANEOUS at 22:49

## 2017-12-03 RX ADMIN — ESCITALOPRAM 20 MG: 20 TABLET, FILM COATED ORAL at 10:00

## 2017-12-03 RX ADMIN — PHENAZOPYRIDINE HYDROCHLORIDE 200 MG: 200 TABLET ORAL at 06:01

## 2017-12-03 RX ADMIN — GABAPENTIN 300 MG: 300 CAPSULE ORAL at 15:00

## 2017-12-03 RX ADMIN — TRAMADOL HYDROCHLORIDE 100 MG: 50 TABLET, FILM COATED ORAL at 01:03

## 2017-12-03 RX ADMIN — TRAZODONE HYDROCHLORIDE 100 MG: 100 TABLET ORAL at 20:00

## 2017-12-03 RX ADMIN — ASPIRIN 81 MG: 81 TABLET, COATED ORAL at 10:00

## 2017-12-03 RX ADMIN — SODIUM CHLORIDE: 9 INJECTION, SOLUTION INTRAVENOUS at 05:55

## 2017-12-03 RX ADMIN — GABAPENTIN 300 MG: 300 CAPSULE ORAL at 20:00

## 2017-12-03 ASSESSMENT — PAIN DESCRIPTION - DESCRIPTORS: DESCRIPTORS: ACHING

## 2017-12-03 ASSESSMENT — PAIN DESCRIPTION - PAIN TYPE: TYPE: ACUTE PAIN

## 2017-12-03 ASSESSMENT — PAIN SCALES - GENERAL
PAINLEVEL_OUTOF10: 3
PAINLEVEL_OUTOF10: 0
PAINLEVEL_OUTOF10: 10
PAINLEVEL_OUTOF10: 9

## 2017-12-03 ASSESSMENT — PAIN DESCRIPTION - LOCATION
LOCATION: GROIN
LOCATION: GENERALIZED

## 2017-12-03 NOTE — PLAN OF CARE
Problem: Falls - Risk of  Goal: Absence of falls  Outcome: Ongoing  Patient up in room with stand by assist, gait steady. Problem: Pain Control  Goal: Maintain pain level at or below patient's acceptable level (or 5 if patient is unable to determine acceptable level)  Outcome: Met This Shift  Patient denies pain. Goal: Improvement in pain related behaviors BP/HR WNL  Outcome: Completed Date Met: 12/03/17      Problem: Cardiovascular  Goal: No DVT, peripheral vascular complications  Outcome: Ongoing  Patient has no s/s of DVT, no redness, no edema or pain in BLE. Problem: Respiratory  Goal: O2 Sat > 90%  Outcome: Completed Date Met: 12/03/17      Problem: GI  Goal: No bowel complications  Outcome: Completed Date Met: 12/03/17    Goal: Bowel movement at least every other day  Outcome: Completed Date Met: 12/03/17      Problem:   Goal: Adequate urinary output  Outcome: Ongoing  Patient voided greater than 30ml per hour. Goal: No urinary complication  Outcome: Ongoing  Patient has burning with urination, taking pyridium. Problem: Skin Integrity/Risk  Goal: No skin breakdown during hospitalization  Outcome: Completed Date Met: 12/03/17    Goal: Wound healing  Outcome: Completed Date Met: 12/03/17      Problem: Discharge Planning:  Goal: Patients continuum of care needs are met  Patients continuum of care needs are met   Outcome: Ongoing  Patient will be discharged to home with , will continue to monitor for any needs. Comments: Care plan reviewed with patient and daughter/SALOME Chung. Patient and family verbalize understanding of the plan of care and contribute to goal setting. Patient has dementia & short term memory loss. Patient is cooperative & follows commands.

## 2017-12-03 NOTE — H&P
Take 1 tablet by mouth 3 times daily as needed for Pain 10/6/17   Kendrick Pack MD   gabapentin (NEURONTIN) 300 MG capsule Take 1 tab PO qhs x 1 week, then take 1 tab PO BID x 1 week, then take 1 tab PO TID. Patient taking differently: 300 mg 3 times daily Take 1 tab PO qhs x 1 week, then take 1 tab PO BID x 1 week, then take 1 tab PO TID. 8/1/17   Wilhemena Sheets, DO   raloxifene (EVISTA) 60 MG tablet TAKE 1 TABLET DAILY 3/27/17   Wilhemena Sheets, DO   atorvastatin (LIPITOR) 40 MG tablet TAKE 1 TABLET NIGHTLY 1/9/17   Wilhemena Sheets, DO   levothyroxine (SYNTHROID) 25 MCG tablet TAKE 1 TABLET DAILY 12/5/16   Jonathan Kate,    escitalopram (LEXAPRO) 20 MG tablet Take 1 tablet by mouth daily 4/27/15   Wilhemena Sheets, DO   traZODone (DESYREL) 100 MG tablet Take 1 tablet by mouth nightly. 1/20/15   Wilhemena Sheets, DO   acetaminophen 650 MG TABS Take 650 mg by mouth every 4 hours as needed. 11/10/14   Victoria Andre MD   rivastigmine (EXELON) 4.6 MG/24HR Place 1 patch onto the skin daily. Historical Provider, MD   aspirin 81 MG EC tablet Take 81 mg by mouth daily. Historical Provider, MD     Allergies: Iodides; Iodine; Morphine; Motrin [ibuprofen micronized]; and Percocet [oxycodone-acetaminophen]    Social History:   TOBACCO:   reports that she has never smoked. She has never used smokeless tobacco.  ETOH:   reports that she does not drink alcohol.     Family History:       Problem Relation Age of Onset   [facility] Cancer Mother      liver, lung    Asthma Father        REVIEW OF SYSTEMS:  CONSTITUTIONAL:  positive for  fatigue and malaise  negative for  fevers and chills  EYES:  negative for  visual disturbance, irritation and redness  HEENT:  negative for  sore mouth, sore throat and hoarseness  RESPIRATORY:  negative for  dry cough, dyspnea, wheezing, hemoptysis and chest pain  CARDIOVASCULAR:  negative for  chest pain, dyspnea, palpitations, orthopnea  GASTROINTESTINAL:  negative for nausea, vomiting, change in bowel

## 2017-12-03 NOTE — ED NOTES
Pt walking the hallway. Pt states that she can't sit in the bed.      Esther Srinivasan RN  12/02/17 6795

## 2017-12-03 NOTE — ED NOTES
Patient to ED for recurring UTI. Patient states its so painful to urinate. Patient currently on ATB.      Janette Braxton RN  12/02/17 1504

## 2017-12-03 NOTE — PROGRESS NOTES
PHARMACY NOTE  Sharla Sorto was ordered Evista. Per the [address], this medication is non-formulary and not stocked by pharmacy. The medication can be reordered at discharge.    Justina Bryant 12/3/2017 4:56 AM

## 2017-12-03 NOTE — ED PROVIDER NOTES
[facility]  eMERGENCY dEPARTMENT eNCOUnter          CHIEF COMPLAINT       Chief Complaint   Patient presents with    Urinary Tract Infection       Nurses Notes reviewed and I agree except as noted in the HPI. HISTORY OF PRESENT ILLNESS    Sinai Meraz is a 78 y.o. female who presents to the Emergency Department for the evaluation of an UTI. The patient reports that she is experiencing extreme dysuria which became present earlier today. She rates her dysuria as 10/10 in severity. The patient states to be experiencing some back pain and denies being on antibiotics. No additional symptoms or complaints at this time. The HPI was provided by the patient. REVIEW OF SYSTEMS     Review of Systems   Constitutional: Negative for appetite change, chills, fatigue and fever. HENT: Negative for congestion, ear pain, rhinorrhea and sore throat. Eyes: Negative for pain, discharge and visual disturbance. Respiratory: Negative for cough, shortness of breath and wheezing. Cardiovascular: Negative for chest pain, palpitations and leg swelling. Gastrointestinal: Negative for abdominal pain, diarrhea, nausea and vomiting. Genitourinary: Positive for dysuria. Negative for difficulty urinating and vaginal discharge. Musculoskeletal: Positive for back pain. Negative for arthralgias, joint swelling and neck pain. Skin: Negative for pallor and rash. Neurological: Negative for dizziness, syncope, weakness, light-headedness and headaches. Hematological: Negative for adenopathy. Psychiatric/Behavioral: Negative for confusion, dysphoric mood and suicidal ideas. The patient is not nervous/anxious. PAST MEDICAL HISTORY    has a past medical history of Anxiety; Arthritis; Bleeding disorder ([address] CAD (coronary artery disease); Cataract; Celiac disease; Dementia; Depression; Fibromyalgia; Hearing impairment; HTN (hypertension); Hyperlipemia; MI (myocardial infarction);  Osteoporosis; Pernicious anemia; Pneumonia; Thyroid disease; Urinary incontinence; Vitamin B12 deficiency; and Vitamin K deficiency. SURGICAL HISTORY      has a past surgical history that includes Hysterectomy (); Cholecystectomy (); Tonsillectomy (as a child ); Appendectomy (); Coronary angioplasty with stent (2007); bladder repair; other surgical history (14); Total knee arthroplasty; Upper gastrointestinal endoscopy (); Colonoscopy (, , ); and Knee arthroscopy (Left, 2016). CURRENT MEDICATIONS       Previous Medications    ACETAMINOPHEN 650 MG TABS    Take 650 mg by mouth every 4 hours as needed. ASPIRIN 81 MG EC TABLET    Take 81 mg by mouth daily. ATORVASTATIN (LIPITOR) 40 MG TABLET    TAKE 1 TABLET NIGHTLY    CIPROFLOXACIN (CIPRO) 500 MG TABLET    Take 1 tablet by mouth 2 times daily for 10 days    ESCITALOPRAM (LEXAPRO) 20 MG TABLET    Take 1 tablet by mouth daily    GABAPENTIN (NEURONTIN) 300 MG CAPSULE    Take 1 tab PO qhs x 1 week, then take 1 tab PO BID x 1 week, then take 1 tab PO TID. LEVOTHYROXINE (SYNTHROID) 25 MCG TABLET    TAKE 1 TABLET DAILY    PHENAZOPYRIDINE (PYRIDIUM) 200 MG TABLET    Take 1 tablet by mouth 3 times daily as needed for Pain    RALOXIFENE (EVISTA) 60 MG TABLET    TAKE 1 TABLET DAILY    RIVASTIGMINE (EXELON) 4.6 MG/24HR    Place 1 patch onto the skin daily. SULFAMETHOXAZOLE-TRIMETHOPRIM (BACTRIM DS) 800-160 MG PER TABLET    Take 1 tablet by mouth 2 times daily for 10 days    TRAZODONE (DESYREL) 100 MG TABLET    Take 1 tablet by mouth nightly. ALLERGIES     is allergic to iodides; iodine; morphine; motrin [ibuprofen micronized]; and percocet [oxycodone-acetaminophen]. FAMILY HISTORY     indicated that her mother is . She indicated that her father is . She indicated that her maternal grandmother is . She indicated that her maternal grandfather is .  She indicated that her paternal grandmother is RESULTS     EKG: All EKG's are interpreted by the Emergency Department Physician who either signs or Co-signs this chart in the absence of a cardiologist.  EKG interpreted by Janel Self MD:    None    RADIOLOGY: non-plain film images(s) such as CT, Ultrasound and MRI are read by the radiologist.    XR CHEST STANDARD (2 VW)   Final Result      No acute cardiopulmonary disease. **This report has been created using voice recognition software. It may contain minor errors which are inherent in voice recognition technology. **      Final report electronically signed by Dr. Winston Villarreal on 12/2/2017 11:26 PM          LABS:   Labs Reviewed   CBC WITH AUTO DIFFERENTIAL - Abnormal; Notable for the following:        Result Value    WBC 12.1 (*)     RBC 2.94 (*)     Hemoglobin 10.4 (*)     Hematocrit 31.9 (*)     .2 (*)     MCH 35.2 (*)     MCHC 32.5 (*)     RDW 20.6 (*)     Segs Absolute 11.3 (*)     Lymphocytes # 0.8 (*)     Monocytes # 0.0 (*)     All other components within normal limits   COMPREHENSIVE METABOLIC PANEL - Abnormal; Notable for the following:     Sodium 124 (*)     Chloride 87 (*)     CO2 20 (*)     Calcium 8.3 (*)     ALT 10 (*)     All other components within normal limits   LACTIC ACID, PLASMA - Abnormal; Notable for the following:     Lactic Acid 2.3 (*)     All other components within normal limits   ANION GAP - Abnormal; Notable for the following:      Anion Gap 17.0 (*)     All other components within normal limits   GLOMERULAR FILTRATION RATE, ESTIMATED - Abnormal; Notable for the following:     Est, Glom Filt Rate 54 (*)     All other components within normal limits   OSMOLALITY - Abnormal; Notable for the following:     Osmolality Calc 249.7 (*)     All other components within normal limits   CULTURE BLOOD #1   CULTURE BLOOD #2   URINE CULTURE   URINALYSIS WITH MICROSCOPIC   SCAN OF BLOOD SMEAR   PROCALCITONIN       EMERGENCY DEPARTMENT COURSE:   Vitals:    Vitals:    12/02/17

## 2017-12-04 LAB
ANION GAP SERPL CALCULATED.3IONS-SCNC: 12 MEQ/L (ref 8–16)
BUN BLDV-MCNC: 10 MG/DL (ref 7–22)
CALCIUM SERPL-MCNC: 7.8 MG/DL (ref 8.5–10.5)
CHLORIDE BLD-SCNC: 104 MEQ/L (ref 98–111)
CO2: 21 MEQ/L (ref 23–33)
CREAT SERPL-MCNC: 0.8 MG/DL (ref 0.4–1.2)
FOLATE: < 2 NG/ML (ref 4.8–24.2)
GFR SERPL CREATININE-BSD FRML MDRD: 70 ML/MIN/1.73M2
GLUCOSE BLD-MCNC: 95 MG/DL (ref 70–108)
POTASSIUM SERPL-SCNC: 4.2 MEQ/L (ref 3.5–5.2)
SODIUM BLD-SCNC: 137 MEQ/L (ref 135–145)
URINE CULTURE, ROUTINE: NORMAL
VITAMIN B-12: 116 PG/ML (ref 211–911)

## 2017-12-04 PROCEDURE — 82746 ASSAY OF FOLIC ACID SERUM: CPT

## 2017-12-04 PROCEDURE — 80048 BASIC METABOLIC PNL TOTAL CA: CPT

## 2017-12-04 PROCEDURE — 90686 IIV4 VACC NO PRSV 0.5 ML IM: CPT | Performed by: INTERNAL MEDICINE

## 2017-12-04 PROCEDURE — 36415 COLL VENOUS BLD VENIPUNCTURE: CPT

## 2017-12-04 PROCEDURE — 2580000003 HC RX 258: Performed by: INTERNAL MEDICINE

## 2017-12-04 PROCEDURE — G0008 ADMIN INFLUENZA VIRUS VAC: HCPCS | Performed by: INTERNAL MEDICINE

## 2017-12-04 PROCEDURE — 6370000000 HC RX 637 (ALT 250 FOR IP): Performed by: INTERNAL MEDICINE

## 2017-12-04 PROCEDURE — 83921 ORGANIC ACID SINGLE QUANT: CPT

## 2017-12-04 PROCEDURE — 82607 VITAMIN B-12: CPT

## 2017-12-04 PROCEDURE — 83090 ASSAY OF HOMOCYSTEINE: CPT

## 2017-12-04 PROCEDURE — 6360000002 HC RX W HCPCS: Performed by: INTERNAL MEDICINE

## 2017-12-04 PROCEDURE — 1200000000 HC SEMI PRIVATE

## 2017-12-04 RX ORDER — FOLIC ACID 1 MG/1
1 TABLET ORAL DAILY
Status: DISCONTINUED | OUTPATIENT
Start: 2017-12-04 | End: 2017-12-05 | Stop reason: HOSPADM

## 2017-12-04 RX ORDER — CYANOCOBALAMIN 1000 UG/ML
1000 INJECTION INTRAMUSCULAR; SUBCUTANEOUS ONCE
Status: COMPLETED | OUTPATIENT
Start: 2017-12-04 | End: 2017-12-05

## 2017-12-04 RX ORDER — LEVOTHYROXINE SODIUM 0.03 MG/1
TABLET ORAL
Qty: 90 TABLET | Refills: 3 | Status: SHIPPED | OUTPATIENT
Start: 2017-12-04 | End: 2018-12-03 | Stop reason: SDUPTHER

## 2017-12-04 RX ADMIN — PHENAZOPYRIDINE HYDROCHLORIDE 200 MG: 200 TABLET ORAL at 09:03

## 2017-12-04 RX ADMIN — ESCITALOPRAM 20 MG: 20 TABLET, FILM COATED ORAL at 09:04

## 2017-12-04 RX ADMIN — SODIUM CHLORIDE: 9 INJECTION, SOLUTION INTRAVENOUS at 19:23

## 2017-12-04 RX ADMIN — PHENAZOPYRIDINE HYDROCHLORIDE 200 MG: 200 TABLET ORAL at 16:38

## 2017-12-04 RX ADMIN — ASPIRIN 81 MG: 81 TABLET, COATED ORAL at 09:04

## 2017-12-04 RX ADMIN — GABAPENTIN 300 MG: 300 CAPSULE ORAL at 20:27

## 2017-12-04 RX ADMIN — ENOXAPARIN SODIUM 40 MG: 40 INJECTION SUBCUTANEOUS at 09:04

## 2017-12-04 RX ADMIN — GABAPENTIN 300 MG: 300 CAPSULE ORAL at 09:04

## 2017-12-04 RX ADMIN — ATORVASTATIN CALCIUM 40 MG: 40 TABLET, FILM COATED ORAL at 20:27

## 2017-12-04 RX ADMIN — WATER 1 G: 1 INJECTION INTRAMUSCULAR; INTRAVENOUS; SUBCUTANEOUS at 22:58

## 2017-12-04 RX ADMIN — INFLUENZA A VIRUS A/SINGAPORE/GP1908/2015 IVR-180A (H1N1) ANTIGEN (PROPIOLACTONE INACTIVATED), INFLUENZA A VIRUS A/HONG KONG/4801/2014 X-263B (H3N2) ANTIGEN (PROPIOLACTONE INACTIVATED), INFLUENZA B VIRUS B/BRISBANE/46/2015 ANTIGEN (PROPIOLACTONE INACTIVATED), AND INFLUENZA B VIRUS B/PHUKET/3073/2013 BVR-1B ANTIGEN (PROPIOLACTONE INACTIVATED) 0.5 ML: 15; 15; 15; 15 INJECTION, SUSPENSION INTRAMUSCULAR at 15:06

## 2017-12-04 RX ADMIN — LEVOTHYROXINE SODIUM 25 MCG: 25 TABLET ORAL at 09:03

## 2017-12-04 RX ADMIN — GABAPENTIN 300 MG: 300 CAPSULE ORAL at 15:03

## 2017-12-04 RX ADMIN — TRAZODONE HYDROCHLORIDE 100 MG: 100 TABLET ORAL at 20:27

## 2017-12-04 ASSESSMENT — PAIN SCALES - GENERAL
PAINLEVEL_OUTOF10: 0

## 2017-12-04 NOTE — PLAN OF CARE
Problem: Falls - Risk of  Goal: Absence of falls  Outcome: Ongoing  No falls or injuries, bed and chair alarms used    Problem: Pain Control  Goal: Maintain pain level at or below patient's acceptable level (or 5 if patient is unable to determine acceptable level)  Outcome: Ongoing  Denies pain    Problem: Neurological  Goal: Maximum potential motor/sensory/cognitive function  Outcome: Ongoing  Confused at times    Problem: Cardiovascular  Goal: No DVT, peripheral vascular complications  Outcome: Ongoing  No signs of DVT    Problem:   Goal: Adequate urinary output  Outcome: Ongoing  Voided 800 ml orange colored urine    Problem: Skin Integrity/Risk  Goal: No skin breakdown during hospitalization  Outcome: Ongoing  No skin break down    Problem: Discharge Planning:  Goal: Patients continuum of care needs are met  Patients continuum of care needs are met   Outcome: Ongoing  Home with help of  when discharged    Comments: Care plan reviewed with patient and . Patient and  verbalize understanding of the plan of care and contribute to goal setting.

## 2017-12-04 NOTE — CARE COORDINATION
12/4/17, 9:38 AM      Dunia Raza       Admitted from: ER 12/2/2017/ 2207 Hospital day: 1   Location: [address] [place] [address] Reason for admit: UTI (urinary tract infection) [N39.0] Status: Inpt  Admit order signed?: yes  PMH:  has a past medical history of Anxiety; Arthritis; Bleeding disorder ([address] CAD (coronary artery disease); Cataract; Celiac disease; Dementia; Depression; Fibromyalgia; Hearing impairment; HTN (hypertension); Hyperlipemia; MI (myocardial infarction); Osteoporosis; Pernicious anemia; Pneumonia; Thyroid disease; Urinary incontinence; Vitamin B12 deficiency; and Vitamin K deficiency. Procedure: No  Pertinent abnormal Imaging:No  Medications:  Scheduled Meds:   cefTRIAXone (ROCEPHIN) IV  1 g Intravenous Q24H    phenazopyridine  200 mg Oral BID    enoxaparin  40 mg Subcutaneous Daily    aspirin  81 mg Oral Daily    atorvastatin  40 mg Oral Nightly    escitalopram  20 mg Oral Daily    gabapentin  300 mg Oral TID    levothyroxine  25 mcg Oral Daily    rivastigmine  1 patch Transdermal Daily    traZODone  100 mg Oral Nightly    influenza virus vaccine  0.5 mL Intramuscular Once     Continuous Infusions:   sodium chloride 75 mL/hr at 12/03/17 1734      Pertinent Info/Orders/Treatment Plan:  VS. I&O. IV fluids. Rocephin. Follow labs. Diet: DIET GENERAL; Gluten Free   DVT Prophylaxis: Lovenox  Smoking status:  reports that she has never smoked.  She has never used smokeless tobacco.   Influenza Vaccination Screening Completed: yes  Pneumonia Vaccination Screening Completed: yes  Core measures: VTE  PCP: Edyta Sales DO  Readmission:   No  Risk Score: 7.5     Discharge Planning  Current Residence:  Private Residence  Living Arrangements:  Spouse/Significant Other   Support Systems:  Spouse/Significant Other  Current Services PTA:     Potential Assistance Needed:  Other (Comment) (pt. has dementia)  Potential Assistance Purchasing Medications:  No  Does patient want to participate in local refill/

## 2017-12-04 NOTE — PLAN OF CARE
Problem: Falls - Risk of  Goal: Absence of falls  Outcome: Ongoing  Bed alarm set. Patient has remained free from falls this shift. Patient is alert and oriented to name and place. Bed to lowest position with door open. Patient care items and call light in reach. Patient uses call light appropriately for assist. Will continue to monitor. Please see fall assessment. Problem: Pain Control  Goal: Maintain pain level at or below patient's acceptable level (or 5 if patient is unable to determine acceptable level)  Outcome: Ongoing  Pt. Denies pain at this time. Will continue to assess needs. Problem: Neurological  Goal: Maximum potential motor/sensory/cognitive function  Outcome: Ongoing  Dementia. Alert to name, place and situation. Hourly rounding and bed alarm in use. Calm and co-operative. Problem: Cardiovascular  Goal: No DVT, peripheral vascular complications  Outcome: Ongoing  Lovenox ordered. No signs or symptoms of DVT noted. Patient ambulates with stand by assist.    Problem:   Goal: Adequate urinary output  Outcome: Ongoing  Patient voiding adequate amounts of clear orange urine. >30 ml/hr. Pyridium given. Incontinent at times. I&O's monitored throughout shift. Problem: Skin Integrity/Risk  Goal: No skin breakdown during hospitalization  Outcome: Ongoing  Encouraged pt. To turn in bed. Encouraged to ambulate. Heels elevated. Encouraged fluids. Will continue to assess needs. No skin breakdown issues at this time. Problem: Discharge Planning:  Goal: Patients continuum of care needs are met  Patients continuum of care needs are met   Outcome: Ongoing  Plans to be discharged home. Pt. Discharge plans are ongoing. Patient and family  informed of and included in their plan of care.

## 2017-12-05 ENCOUNTER — TELEPHONE (OUTPATIENT)
Dept: FAMILY MEDICINE CLINIC | Age: 77
End: 2017-12-05

## 2017-12-05 VITALS
OXYGEN SATURATION: 92 % | RESPIRATION RATE: 18 BRPM | HEART RATE: 57 BPM | DIASTOLIC BLOOD PRESSURE: 67 MMHG | HEIGHT: 59 IN | WEIGHT: 150 LBS | SYSTOLIC BLOOD PRESSURE: 140 MMHG | TEMPERATURE: 97.2 F | BODY MASS INDEX: 30.24 KG/M2

## 2017-12-05 PROBLEM — E53.8 B12 DEFICIENCY: Status: ACTIVE | Noted: 2017-12-05

## 2017-12-05 PROBLEM — E53.8 FOLATE DEFICIENCY: Status: ACTIVE | Noted: 2017-12-05

## 2017-12-05 PROBLEM — G93.41 METABOLIC ENCEPHALOPATHY: Status: ACTIVE | Noted: 2017-12-05

## 2017-12-05 PROCEDURE — 6370000000 HC RX 637 (ALT 250 FOR IP): Performed by: INTERNAL MEDICINE

## 2017-12-05 PROCEDURE — 6360000002 HC RX W HCPCS: Performed by: INTERNAL MEDICINE

## 2017-12-05 RX ORDER — FOLIC ACID 1 MG/1
1 TABLET ORAL DAILY
Qty: 100 TABLET | Refills: 3 | Status: SHIPPED | OUTPATIENT
Start: 2017-12-06 | End: 2020-07-06

## 2017-12-05 RX ORDER — CHOLECALCIFEROL (VITAMIN D3) 125 MCG
500 CAPSULE ORAL DAILY
Qty: 90 TABLET | Refills: 3 | Status: SHIPPED | OUTPATIENT
Start: 2017-12-05 | End: 2018-12-05

## 2017-12-05 RX ORDER — PHENAZOPYRIDINE HYDROCHLORIDE 200 MG/1
200 TABLET, FILM COATED ORAL 2 TIMES DAILY
Qty: 9 TABLET | Refills: 0 | Status: SHIPPED | OUTPATIENT
Start: 2017-12-05 | End: 2017-12-10

## 2017-12-05 RX ADMIN — FOLIC ACID 1 MG: 1 TABLET ORAL at 08:56

## 2017-12-05 RX ADMIN — ASPIRIN 81 MG: 81 TABLET, COATED ORAL at 08:56

## 2017-12-05 RX ADMIN — PHENAZOPYRIDINE HYDROCHLORIDE 200 MG: 200 TABLET ORAL at 08:56

## 2017-12-05 RX ADMIN — CYANOCOBALAMIN 1000 MCG: 1000 INJECTION, SOLUTION INTRAMUSCULAR at 00:14

## 2017-12-05 RX ADMIN — GABAPENTIN 300 MG: 300 CAPSULE ORAL at 08:56

## 2017-12-05 RX ADMIN — ENOXAPARIN SODIUM 40 MG: 40 INJECTION SUBCUTANEOUS at 08:56

## 2017-12-05 RX ADMIN — LEVOTHYROXINE SODIUM 25 MCG: 25 TABLET ORAL at 05:57

## 2017-12-05 RX ADMIN — FOLIC ACID 1 MG: 1 TABLET ORAL at 00:13

## 2017-12-05 RX ADMIN — ESCITALOPRAM 20 MG: 20 TABLET, FILM COATED ORAL at 08:56

## 2017-12-05 ASSESSMENT — PAIN SCALES - GENERAL
PAINLEVEL_OUTOF10: 0

## 2017-12-05 NOTE — PROGRESS NOTES
INTERNAL MEDICINE Progress Note  12/5/2017 10:36 AM  Subjective:   Admit Date: 12/2/2017  PCP: Kari Ceballos DO  Interval History: calm, no new c/o    Objective:   Vitals: BP (!) 140/67   Pulse 57   Temp 97.2 °F (36.2 °C) (Oral)   Resp 18   Ht 4' 11\" (1.499 m)   Wt 150 lb (68 kg)   SpO2 92%   BMI 30.30 kg/m²   General appearance: alert and cooperative with exam  HEENT:  atraumatic  Neck:  no JVD  Lungs: clear to auscultation bilaterally  Heart: S1, S2 normal  Abdomen: soft, non-tender; bowel sounds normal; no masses,  no organomegaly  Extremities: no edema,   Neurologic:  Alert, oriented, thought content appropriate      Medications:   Scheduled Meds:   folic acid  1 mg Oral Daily    cefTRIAXone (ROCEPHIN) IV  1 g Intravenous Q24H    phenazopyridine  200 mg Oral BID    enoxaparin  40 mg Subcutaneous Daily    aspirin  81 mg Oral Daily    atorvastatin  40 mg Oral Nightly    escitalopram  20 mg Oral Daily    gabapentin  300 mg Oral TID    levothyroxine  25 mcg Oral Daily    rivastigmine  1 patch Transdermal Daily    traZODone  100 mg Oral Nightly     Continuous Infusions:       Lab Results:   CBC:   Recent Labs      12/02/17 2305 12/03/17   1302   WBC  12.1*  7.5   HGB  10.4*  10.6*   PLT  362  284     BMP:    Recent Labs      12/02/17 2305 12/04/17   0536   NA  124*  137   K  3.9  4.2   CL  87*  104   CO2  20*  21*   BUN  13  10   CREATININE  1.0  0.8   GLUCOSE  97  95     Hepatic:   Recent Labs      12/02/17 2305   AST  22   ALT  10*   BILITOT  0.6   ALKPHOS  94     TSH:    Lab Results   Component Value Date    TSH 1.670 12/03/2017     FOLATE:    Lab Results   Component Value Date    FOLATE < 2.0 12/04/2017      Vitamin B-12 116 (L) pg/mL    Folate < 2.0 (L) ng/mL       Assessment and Plan:   1. Delirium / metabolic encephalopathy  2. Dysuria, ? Failed OP antibiotic treatment  3. hyponatremia  4. Hypothyroidism  5. Dementia  6. Vit b12 def  7. Folate def     Clinically stable.   cont B12 and folic acid daily  Home today.        Henry Owen MD

## 2017-12-05 NOTE — TELEPHONE ENCOUNTER
12/05/2017 . Transition of Care visit scheduled. 12/14/2017  Patient is being discharged to home. da

## 2017-12-05 NOTE — PROGRESS NOTES
INTERNAL MEDICINE Progress Note  12/4/2017 8:52 PM  Subjective:   Admit Date: 12/2/2017  PCP: Luz Berg DO  Interval History: calm, no new c/o    Objective:   Vitals: BP (!) 148/67   Pulse 62   Temp 98.3 °F (36.8 °C) (Oral)   Resp 18   Ht 4' 11\" (1.499 m)   Wt 150 lb (68 kg)   SpO2 95%   BMI 30.30 kg/m²   General appearance: alert and cooperative with exam  HEENT: Head: atraumatic  Neck: no adenopathy, no carotid bruit and no JVD  Lungs: clear to auscultation bilaterally  Heart: S1, S2 normal  Abdomen: soft, non-tender; bowel sounds normal; no masses,  no organomegaly  Extremities: no edema, redness or tenderness in the calves or thighs  Neurologic: Mental status: Alert, oriented, thought content appropriate      Medications:   Scheduled Meds:   cefTRIAXone (ROCEPHIN) IV  1 g Intravenous Q24H    phenazopyridine  200 mg Oral BID    enoxaparin  40 mg Subcutaneous Daily    aspirin  81 mg Oral Daily    atorvastatin  40 mg Oral Nightly    escitalopram  20 mg Oral Daily    gabapentin  300 mg Oral TID    levothyroxine  25 mcg Oral Daily    rivastigmine  1 patch Transdermal Daily    traZODone  100 mg Oral Nightly     Continuous Infusions:   sodium chloride 75 mL/hr at 12/04/17 1923       Lab Results:   CBC:   Recent Labs      12/02/17 2305  12/03/17   1302   WBC  12.1*  7.5   HGB  10.4*  10.6*   PLT  362  284     BMP:    Recent Labs      12/02/17 2305 12/04/17   0536   NA  124*  137   K  3.9  4.2   CL  87*  104   CO2  20*  21*   BUN  13  10   CREATININE  1.0  0.8   GLUCOSE  97  95     Hepatic:   Recent Labs      12/02/17 2305   AST  22   ALT  10*   BILITOT  0.6   ALKPHOS  94     TSH:    Lab Results   Component Value Date    TSH 1.670 12/03/2017     FOLATE:    Lab Results   Component Value Date    FOLATE < 2.0 12/04/2017      Vitamin B-12 116 (L) pg/mL    Folate < 2.0 (L) ng/mL       Assessment and Plan:   1. Delirium / metabolic encephalopathy  2. Dysuria, ?  Failed OP antibiotic

## 2017-12-05 NOTE — PLAN OF CARE
Problem: Falls - Risk of  Goal: Absence of falls  Outcome: Ongoing  Patient free from falls this shift, up with assistance, skid proof footwear on while up, patient has mild confusion. Bed and chair alarms in use    Problem: Pain Control  Goal: Maintain pain level at or below patient's acceptable level (or 5 if patient is unable to determine acceptable level)  Outcome: Ongoing  Patient denies pain     Problem: Neurological  Goal: Maximum potential motor/sensory/cognitive function  Outcome: Ongoing  Patient is alert and oriented to person, place, and time. Occasional confusion with situation. Patient reoriented    Problem: Cardiovascular  Goal: No DVT, peripheral vascular complications  Outcome: Ongoing  Patient has no signs or symptoms of dvt, patient is on lovenox and ambulates in the rodriguez    Problem:   Goal: Adequate urinary output  Outcome: Ongoing  Patient voided adequate amounts  Goal: No urinary complication  Outcome: Ongoing  Patient is voiding adequate amounts, orange urine from medication    Problem: Skin Integrity/Risk  Goal: No skin breakdown during hospitalization  Outcome: Ongoing  Patient has bruising on arms, no skin breakdown noted otherwise    Problem: Discharge Planning:  Goal: Patients continuum of care needs are met  Patients continuum of care needs are met   Outcome: Ongoing  Patient to be discharged home to family    Comments: Care plan reviewed with patient. Patient verbalize understanding of the plan of care and contribute to goal setting.

## 2017-12-05 NOTE — CARE COORDINATION
12/5/17, 12:26 PM    Discharge plan discussed by  and . Discharge plan reviewed with patient/ family. Patient/ family verbalize understanding of discharge plan and are in agreement with plan. Understanding was demonstrated using the teach back method. IMM Letter  IMM Letter date given[de-identified] 12/03/17  IMM Letter time given[de-identified] [id number]       Discharge order in and pt plans home today with spouse and no new services. She states her spouse is able to assist her if needed. No intentions of appeals or objections.

## 2017-12-06 LAB — HOMOCYSTEINE, TOTAL: 85 UMOL/L

## 2017-12-06 NOTE — TELEPHONE ENCOUNTER
[facility] Transitions Initial Follow Up Call    Call within 2 business days of discharge: Yes    Patient: Magaly Yi Patient : 1940   MRN: [id number]  Reason for Admission: There are no discharge diagnoses documented for the most recent discharge.   Discharge Date: 17 RARS: Eliciadewey @DIEGO([phone])@     Spoke with: Ivie Apgar ( ) on signed hippa    Facility: [unfilled]    Non-face-to-face services provided:      Have the medications prescribed at discharge been filled? no    Has the patient experienced any new symptoms or have previous symptoms worsened? no  F/U appt confirmed      Follow Up  Future Appointments  Date Time Provider Natalie Henning   2017 5:40 PM Satinder Dash DO [facility]       Sonya Banuelos LPN

## 2017-12-07 LAB — METHYLMALONIC ACID: NORMAL

## 2017-12-08 LAB
BLOOD CULTURE, ROUTINE: NORMAL
BLOOD CULTURE, ROUTINE: NORMAL

## 2017-12-14 ENCOUNTER — HOSPITAL ENCOUNTER (EMERGENCY)
Age: 77
Discharge: HOME | End: 2017-12-14
Payer: MEDICARE

## 2017-12-14 VITALS
WEIGHT: 138 LBS | BODY MASS INDEX: 27.82 KG/M2 | SYSTOLIC BLOOD PRESSURE: 135 MMHG | OXYGEN SATURATION: 99 % | HEIGHT: 59 IN | TEMPERATURE: 98.6 F | HEART RATE: 60 BPM | RESPIRATION RATE: 16 BRPM | DIASTOLIC BLOOD PRESSURE: 66 MMHG

## 2017-12-14 DIAGNOSIS — J00 ACUTE NASOPHARYNGITIS: ICD-10-CM

## 2017-12-14 DIAGNOSIS — N30.00 ACUTE CYSTITIS WITHOUT HEMATURIA: Primary | ICD-10-CM

## 2017-12-14 LAB
BILIRUBIN URINE: NEGATIVE
BLOOD, URINE: NEGATIVE
CHARACTER, URINE: CLEAR
COLOR: YELLOW
GLUCOSE, URINE: NEGATIVE MG/DL
KETONES, URINE: NEGATIVE
LEUKOCYTES, UA: ABNORMAL
NITRATE, UA: NEGATIVE
PH UA: 6.5 (ref 5–9)
PROTEIN UA: NEGATIVE MG/DL
REFLEX TO URINE C & S: ABNORMAL
SPECIFIC GRAVITY UA: 1.01 (ref 1–1.03)
UROBILINOGEN, URINE: 0.2 EU/DL (ref 0–1)

## 2017-12-14 PROCEDURE — 81003 URINALYSIS AUTO W/O SCOPE: CPT

## 2017-12-14 PROCEDURE — 99214 OFFICE O/P EST MOD 30 MIN: CPT

## 2017-12-14 PROCEDURE — 87086 URINE CULTURE/COLONY COUNT: CPT

## 2017-12-14 RX ORDER — L.ACIDOPH,PLANT/B.ANIMAL,LONG 2B CELL
1 CAPSULE ORAL 2 TIMES DAILY
Qty: 28 CAPSULE | Refills: 0 | Status: SHIPPED | OUTPATIENT
Start: 2017-12-14 | End: 2017-12-28

## 2017-12-14 RX ORDER — CEFDINIR 300 MG/1
300 CAPSULE ORAL 2 TIMES DAILY
Qty: 14 CAPSULE | Refills: 0 | Status: SHIPPED | OUTPATIENT
Start: 2017-12-14 | End: 2017-12-21

## 2017-12-14 ASSESSMENT — PAIN DESCRIPTION - ORIENTATION: ORIENTATION: LOWER

## 2017-12-14 ASSESSMENT — ENCOUNTER SYMPTOMS
ABDOMINAL PAIN: 1
CHEST TIGHTNESS: 0
RHINORRHEA: 1
NAUSEA: 1
WHEEZING: 0
STRIDOR: 0
COUGH: 0
VOICE CHANGE: 0
SINUS PRESSURE: 0
SHORTNESS OF BREATH: 0
CHOKING: 0
SINUS PAIN: 0
VOMITING: 0
SORE THROAT: 0
APNEA: 0

## 2017-12-14 ASSESSMENT — PAIN SCALES - GENERAL: PAINLEVEL_OUTOF10: 8

## 2017-12-14 ASSESSMENT — PAIN DESCRIPTION - DESCRIPTORS: DESCRIPTORS: PRESSURE

## 2017-12-14 ASSESSMENT — PAIN DESCRIPTION - PAIN TYPE: TYPE: ACUTE PAIN

## 2017-12-14 ASSESSMENT — PAIN DESCRIPTION - LOCATION: LOCATION: ABDOMEN

## 2017-12-14 NOTE — ED PROVIDER NOTES
Ar[facility]16  Urgent Care Encounter      CHIEF COMPLAINT       Chief Complaint   Patient presents with    Dysuria    Urinary Retention       Nurses Notes reviewed and I agree except as noted in the HPI. HISTORY OF PRESENT ILLNESS   Randi Nova is a 78 y.o. The history is provided by the patient. No  was used. Dysuria    This is a recurrent problem. The current episode started 2 days ago. The problem occurs every urination. The quality of the pain is described as aching and burning. The pain is severe. There has been no fever. She is not sexually active. There is no history of pyelonephritis. Associated symptoms include chills, sweats, nausea, frequency and urgency. Pertinent negatives include no vomiting, no discharge, no hematuria, no hesitancy, no possible pregnancy and no flank pain. She has tried nothing for the symptoms. Her past medical history does not include kidney stones, single kidney, urological procedure, recurrent UTIs, urinary stasis or catheterization. REVIEW OF SYSTEMS     Review of Systems   Constitutional: Positive for chills, diaphoresis and fatigue. HENT: Positive for congestion, postnasal drip and rhinorrhea. Negative for ear pain, sinus pain, sinus pressure, sneezing, sore throat and voice change. Respiratory: Negative for apnea, cough, choking, chest tightness, shortness of breath, wheezing and stridor. Cardiovascular: Negative for chest pain, palpitations and leg swelling. Gastrointestinal: Positive for abdominal pain and nausea. Negative for vomiting. Genitourinary: Positive for dysuria, frequency and urgency. Negative for decreased urine volume, difficulty urinating, dyspareunia, enuresis, flank pain, genital sores, hematuria, hesitancy, menstrual problem, pelvic pain, vaginal bleeding, vaginal discharge and vaginal pain.        PAST MEDICAL HISTORY         Diagnosis Date    Anxiety     Arthritis     Bleeding disorder ([facility] [address]     CAD (coronary artery disease)     Stent in 1st diagonal in 2007    Cataract     Celiac disease     Dementia     Depression     Fibromyalgia     Hearing impairment     HTN (hypertension)     Hyperlipemia     MI (myocardial infarction) 2003    Osteoporosis     Pernicious anemia     Pneumonia     Thyroid disease     Urinary incontinence     Vitamin B12 deficiency     Vitamin K deficiency        SURGICAL HISTORY     Patient  has a past surgical history that includes Hysterectomy (1964); Cholecystectomy (1963); Tonsillectomy (as a child ); Appendectomy (1963); Coronary angioplasty with stent (5/2007); bladder repair; other surgical history (5-16-14); Total knee arthroplasty; Upper gastrointestinal endoscopy (2004); Colonoscopy (2004, 2014, 2015); and Knee arthroscopy (Left, 07/19/2016). CURRENT MEDICATIONS       Previous Medications    ACETAMINOPHEN 650 MG TABS    Take 650 mg by mouth every 4 hours as needed. ASPIRIN 81 MG EC TABLET    Take 81 mg by mouth daily. ATORVASTATIN (LIPITOR) 40 MG TABLET    TAKE 1 TABLET NIGHTLY    ESCITALOPRAM (LEXAPRO) 20 MG TABLET    Take 1 tablet by mouth daily    FOLIC ACID (FOLVITE) 1 MG TABLET    Take 1 tablet by mouth daily    GABAPENTIN (NEURONTIN) 300 MG CAPSULE    Take 1 tab PO qhs x 1 week, then take 1 tab PO BID x 1 week, then take 1 tab PO TID. LEVOTHYROXINE (SYNTHROID) 25 MCG TABLET    TAKE 1 TABLET DAILY    RALOXIFENE (EVISTA) 60 MG TABLET    TAKE 1 TABLET DAILY    RIVASTIGMINE (EXELON) 4.6 MG/24HR    Place 1 patch onto the skin daily. TRAZODONE (DESYREL) 100 MG TABLET    Take 1 tablet by mouth nightly. VITAMIN B-12 (CYANOCOBALAMIN) 500 MCG TABLET    Take 1 tablet by mouth daily       ALLERGIES     Patient is is allergic to iodides; iodine; morphine; motrin [ibuprofen micronized]; and percocet [oxycodone-acetaminophen]. FAMILY HISTORY     Patient's family history includes Asthma in her father; Cancer in her mother.     SOCIAL HISTORY     Patient  reports that she has never smoked. She has never used smokeless tobacco. She reports that she does not drink alcohol or use drugs. PHYSICAL EXAM     ED TRIAGE VITALS  BP: 135/66, Temp: 98.6 °F (37 °C), Pulse: 60, Resp: 16, SpO2: 99 %  Physical Exam   Constitutional: She is oriented to person, place, and time. Vital signs are normal. She appears well-developed and well-nourished. She is active and cooperative. Non-toxic appearance. She does not have a sickly appearance. She appears ill. No distress. HENT:   Head: Normocephalic and atraumatic. Right Ear: Tympanic membrane normal. Right ear swelling: redness. Left Ear: Tympanic membrane normal. Left ear swelling: redness. Nose: Mucosal edema and rhinorrhea present. Right sinus exhibits no maxillary sinus tenderness and no frontal sinus tenderness. Left sinus exhibits no maxillary sinus tenderness and no frontal sinus tenderness. Mouth/Throat: Uvula is midline and mucous membranes are normal. Posterior oropharyngeal edema and posterior oropharyngeal erythema present. Eyes: Conjunctivae and EOM are normal.   Neck: Normal range of motion. Cardiovascular: Normal rate, regular rhythm and normal heart sounds. Exam reveals no gallop and no friction rub. No murmur heard. Pulmonary/Chest: Effort normal and breath sounds normal. No respiratory distress. She has no wheezes. She has no rales. She exhibits no tenderness. Musculoskeletal: Normal range of motion. Neurological: She is alert and oriented to person, place, and time. Skin: Skin is warm and dry. She is not diaphoretic. Psychiatric: She has a normal mood and affect. Her behavior is normal. Judgment and thought content normal.   Nursing note and vitals reviewed.       DIAGNOSTIC RESULTS   Labs:  Results for orders placed or performed during the hospital encounter of 12/14/17   UA without Microscopic Reflex C&S   Result Value Ref Range    Glucose, Urine Negative NEGATIVE mg/dl    Bilirubin Urine Negative NEGATIVE    Ketones, Urine Negative NEGATIVE    Specific Gravity, UA 1.015 1.002 - 1.03    Blood, Urine Negative NEGATIVE    pH, UA 6.50 5.0 - 9.0    Protein, UA Negative NEGATIVE mg/dl    Urobilinogen, Urine 0.20 0.0 - 1.0 eu/dl    Nitrate, UA Negative NEGATIVE    LEUKOCYTES, UA Moderate (A) NEGATIVE    Color, UA Yellow STRAW-YELL    Character, Urine Clear CLEAR-SL C    REFLEX TO URINE C & S INDICATED        IMAGING:  No orders to display     URGENT CARE COURSE:     Vitals:    12/14/17 0944   BP: 135/66   Pulse: 60   Resp: 16   Temp: 98.6 °F (37 °C)   TempSrc: Oral   SpO2: 99%   Weight: 138 lb (62.6 kg)   Height: 4' 11\" (1.499 m)       Medications - No data to display  PROCEDURES:  None  FINAL IMPRESSION      1. Acute cystitis without hematuria    2. Acute nasopharyngitis        DISPOSITION/PLAN        Patient or Patient designated representative was advised to drink plenty of water or fluids and take medication as prescribed. The patient or Patient designated representative is advised to monitor for any changes in pain, development of high fever, chills, persistent vomiting, development of increasing back or flank pain or increase in hematuria the patient is advised to go to the emergency department for reevaluation and further follow-up if they wouldn't notice any of the above symptoms. The patient or Patient designated representative was also advised to follow up with family doctor or primary care provider after the antibiotic is completed for repeat urinalysis. The patient did verbalize understanding of discharge instructions and is agreeable to the treatment plan. The patient left ambulatory without any changes or concerns in stable condition.       PATIENT REFERRED TO:  DO Tim Parker 5  [phone]    Schedule an appointment as soon as possible for a visit   For re-check    DISCHARGE MEDICATIONS:  New Prescriptions    CEFDINIR (OMNICEF) 300

## 2017-12-15 ENCOUNTER — TELEPHONE (OUTPATIENT)
Dept: FAMILY MEDICINE CLINIC | Age: 77
End: 2017-12-15

## 2017-12-15 LAB
ORGANISM: ABNORMAL
URINE CULTURE REFLEX: ABNORMAL

## 2017-12-18 NOTE — TELEPHONE ENCOUNTER
Spoke with pt's , Danny Fuller, he states it was burning with urination she was requesting help with, however, he states this cleared up the same day on Friday, and she is no longer having the burning with urination.

## 2017-12-19 ENCOUNTER — OFFICE VISIT (OUTPATIENT)
Dept: FAMILY MEDICINE CLINIC | Age: 77
End: 2017-12-19
Payer: MEDICARE

## 2017-12-19 VITALS — HEART RATE: 63 BPM | TEMPERATURE: 98.1 F | DIASTOLIC BLOOD PRESSURE: 70 MMHG | SYSTOLIC BLOOD PRESSURE: 136 MMHG

## 2017-12-19 DIAGNOSIS — M79.7 FIBROMYALGIA: ICD-10-CM

## 2017-12-19 DIAGNOSIS — R30.0 DYSURIA: Primary | ICD-10-CM

## 2017-12-19 DIAGNOSIS — R41.0 DELIRIUM: ICD-10-CM

## 2017-12-19 LAB
BACTERIA: ABNORMAL
BILIRUBIN URINE: NEGATIVE
BLOOD, URINE: ABNORMAL
CASTS: ABNORMAL /LPF
CASTS: ABNORMAL /LPF
CHARACTER, URINE: ABNORMAL
COLOR: YELLOW
CRYSTALS: ABNORMAL
EPITHELIAL CELLS, UA: ABNORMAL /HPF
GLUCOSE, URINE: NEGATIVE MG/DL
KETONES, URINE: NEGATIVE
LEUKOCYTE ESTERASE, URINE: ABNORMAL
MISCELLANEOUS LAB TEST RESULT: ABNORMAL
MUCUS: ABNORMAL
NITRITE, URINE: NEGATIVE
PH UA: 5
PROTEIN UA: NEGATIVE MG/DL
RBC URINE: ABNORMAL /HPF
RENAL EPITHELIAL, UA: ABNORMAL
SPECIFIC GRAVITY UA: 1.02 (ref 1–1.03)
UROBILINOGEN, URINE: 0.2 EU/DL
WBC UA: ABNORMAL /HPF
YEAST: ABNORMAL

## 2017-12-19 PROCEDURE — 99495 TRANSJ CARE MGMT MOD F2F 14D: CPT | Performed by: FAMILY MEDICINE

## 2017-12-19 PROCEDURE — 1111F DSCHRG MED/CURRENT MED MERGE: CPT | Performed by: FAMILY MEDICINE

## 2017-12-19 RX ORDER — GABAPENTIN 300 MG/1
300 CAPSULE ORAL 3 TIMES DAILY
Qty: 270 CAPSULE | Refills: 3 | Status: SHIPPED | OUTPATIENT
Start: 2017-12-19 | End: 2020-07-06

## 2017-12-19 RX ORDER — PHENAZOPYRIDINE HYDROCHLORIDE 200 MG/1
200 TABLET, FILM COATED ORAL 3 TIMES DAILY PRN
Qty: 9 TABLET | Refills: 0 | Status: SHIPPED | OUTPATIENT
Start: 2017-12-19 | End: 2017-12-22

## 2017-12-19 NOTE — PROGRESS NOTES
Have you changed or started any medications since your last visit including any over-the-counter medicines, vitamins, or herbal medicines? yes   Are you having any side effects from any of your medications? -  no  Have you stopped taking any of your medications? Is so, why? -  no    Have you seen any other physician or provider since your last visit? Yes - Records Obtained  Have you had any other diagnostic tests since your last visit? Yes - Records Obtained  Have you been seen in the emergency room and/or had an admission to a hospital since we last saw you? Yes - Records Obtained  Have you had your routine dental cleaning in the past 6 months? no    Have you activated your Patient Feed account? If not, what are your barriers? No:      Patient Care Team:  Kenan Curiel DO as PCP - General (Family Medicine)    Medical History Review  Past Medical, Family, and Social History     Defer to provider.

## 2017-12-19 NOTE — PROGRESS NOTES
Transition of Care Visit/Hospital Follow Up:      Pacheco Sykes is a 78 y.o. female that presents for Follow-Up from Hospital (UTI  about the same  still burning )        Date of Discharge:   12/5/17  Was patient contacted within 2 business days of discharge (see chart for documentation):  yes - 12/6/17      Patient presents for hospital follow up. Patient recently hospitalized at [facility] for treatment of possible UTI, Delirium. Symptoms prior to admission:  AMS, dysuria     Hospital Course per DC Summary:    Clinically stable. [facility] W37 and folic acid daily  Home today.      Treatments:   Hydration , antibiotic, U76 and folic acid supplementation. Medication changes at discharge:  ceftin    Clinical course since discharge:  Urine culture from most recent hospitalization was normal.  She was seen in the UC last week and culture grew mixed growth. She is currently on omnicef, she states taht she is out of this despite stop date of 12/21. She states that she is still having some burning with after urination. No blood in her urine. She is having some urinary frequency. Stats that she is drinking plenty of water. Her  states she continues to have some confusion, he relates this to confusion with dates, medications and day-to-day issues.          Current Outpatient Prescriptions   Medication Sig Dispense Refill    phenazopyridine (PYRIDIUM) 200 MG tablet Take 1 tablet by mouth 3 times daily as needed for Pain 9 tablet 0    gabapentin (NEURONTIN) 300 MG capsule Take 1 capsule by mouth 3 times daily 270 capsule 3    cefdinir (OMNICEF) 300 MG capsule Take 1 capsule by mouth 2 times daily for 7 days 14 capsule 0    Probiotic Product (PROBIOTIC ACIDOPHILUS BEADS) CAPS Take 1 capsule by mouth 2 times daily for 14 days 28 capsule 0    folic acid (FOLVITE) 1 MG tablet Take 1 tablet by mouth daily 100 tablet 3    vitamin B-12 (CYANOCOBALAMIN) 500 MCG tablet Take 1 tablet by mouth daily 90 tablet 3    levothyroxine (SYNTHROID) 25 MCG tablet TAKE 1 TABLET DAILY 90 tablet 3    raloxifene (EVISTA) 60 MG tablet TAKE 1 TABLET DAILY 90 tablet 3    atorvastatin (LIPITOR) 40 MG tablet TAKE 1 TABLET NIGHTLY 90 tablet 3    escitalopram (LEXAPRO) 20 MG tablet Take 1 tablet by mouth daily 30 tablet 3    traZODone (DESYREL) 100 MG tablet Take 1 tablet by mouth nightly. 90 tablet 3    acetaminophen 650 MG TABS Take 650 mg by mouth every 4 hours as needed. 120 tablet 3    rivastigmine (EXELON) 4.6 MG/24HR Place 1 patch onto the skin daily.  aspirin 81 MG EC tablet Take 81 mg by mouth daily. No current facility-administered medications for this visit. Past Medical History:   Diagnosis Date    Anxiety     Arthritis     Bleeding disorder ([facility] [address]     CAD (coronary artery disease)     Stent in 1st diagonal in 2007    Cataract     Celiac disease     Dementia     Depression     Fibromyalgia     Hearing impairment     HTN (hypertension)     Hyperlipemia     MI (myocardial infarction) 2003    Osteoporosis     Pernicious anemia     Pneumonia     Thyroid disease     Urinary incontinence     Vitamin B12 deficiency     Vitamin K deficiency        Past Surgical History:   Procedure Laterality Date    APPENDECTOMY  1963    BLADDER REPAIR      x2    CHOLECYSTECTOMY  1963    COLONOSCOPY  2004, 2014, 2015    CORONARY ANGIOPLASTY WITH STENT PLACEMENT  5/2007    HYSTERECTOMY  1964    KNEE ARTHROSCOPY Left 07/19/2016    Dr Geoff Flores HISTORY  5-16-14    Colonoscopy  2.  [address] [facility]    TONSILLECTOMY  as a child     TOTAL KNEE ARTHROPLASTY      UPPER GASTROINTESTINAL ENDOSCOPY  2004       Social History   Substance Use Topics    Smoking status: Never Smoker    Smokeless tobacco: Never Used    Alcohol use No       Family History   Problem Relation Age of Onset    Cancer Mother      liver, lung    Asthma Father          I have reviewed the patient's past medical history, past surgical history, allergies, medications, social and family history and I have made updates where appropriate. PHYSICAL EXAM:  Vitals:    12/19/17 1432   BP: 136/70   Pulse: 63   Temp: 98.1 °F (36.7 °C)     General Appearance: well developed and well- nourished, in no acute distress  Head: normocephalic and atraumatic  ENT: external ear and ear canal normal bilaterally, nose without deformity, nasal mucosa and turbinates normal without polyps, oropharynx normal, dentition is normal for age, no lip or gum lesions noted  Neck: supple and non-tender without mass, no thyromegaly or thyroid nodules, no cervical lymphadenopathy  Pulmonary/Chest: clear to auscultation bilaterally- no wheezes, rales or rhonchi, normal air movement, no respiratory distress or retractions  Cardiovascular: normal rate, regular rhythm, normal S1 and S2, no murmurs, rubs, clicks, or gallops, distal pulses intact  Abdomen: soft, non-tender, non-distended, no rebound or guarding, no masses or hernias noted, no hepatospleenomegaly  Extremities: no cyanosis, clubbing or edema of the lower extremities  Psych:  Normal affect without evidence of depression or anxiety, insight and judgement are appropriate, memory appears impaired  Skin: warm and dry, no rash or erythema      Diagnostic test results reviewed: inpatient labs    Patient risk of morbidity and mortality: moderate      ASSESSMENT & PLAN  Myra Persaud was seen today for follow-up from hospital.    Diagnoses and all orders for this visit:    Dysuria  -     Urine Culture; Future  -     Urinalysis with Microscopic; Future  -     phenazopyridine (PYRIDIUM) 200 MG tablet; Take 1 tablet by mouth 3 times daily as needed for Pain    Fibromyalgia  -     gabapentin (NEURONTIN) 300 MG capsule;  Take 1 capsule by mouth 3 times daily    Delirium    Other orders  -     Urine Culture  -     Urinalysis with Microscopic    -We got her delirium is probably more related to the dementia than it was to an infection. Her urine culture obtained in the hospital was clear. She did tell me today that she no longer has any of the antibiotic laughter which leads me to believe that she was taking more than prescribed. Well obtain repeat urine studies and hold off on an antibiotic unless this were to come back positive. If negative, could consider Urology referral.    Return if symptoms worsen or fail to improve. Level of medical complexity:  deysi      Myra Persaud received counseling on the following healthy behaviors: medication adherence  Reviewed prior labs and health maintenance. Continue current medications, diet and exercise. Discussed use, benefit, and side effects of prescribed medications. Barriers to medication compliance addressed. Patient given educational materials - see patient instructions. All patient questions answered. Patient voiced understanding.    \

## 2017-12-21 ENCOUNTER — TELEPHONE (OUTPATIENT)
Dept: FAMILY MEDICINE CLINIC | Age: 77
End: 2017-12-21

## 2017-12-21 DIAGNOSIS — N30.00 ACUTE CYSTITIS WITHOUT HEMATURIA: Primary | ICD-10-CM

## 2017-12-21 LAB
ORGANISM: ABNORMAL
URINE CULTURE, ROUTINE: ABNORMAL
URINE CULTURE, ROUTINE: ABNORMAL

## 2017-12-21 RX ORDER — FLUCONAZOLE 100 MG/1
100 TABLET ORAL DAILY
Qty: 7 TABLET | Refills: 0 | Status: SHIPPED | OUTPATIENT
Start: 2017-12-21 | End: 2017-12-28

## 2018-01-04 RX ORDER — ATORVASTATIN CALCIUM 40 MG/1
TABLET, FILM COATED ORAL
Qty: 90 TABLET | Refills: 3 | Status: SHIPPED | OUTPATIENT
Start: 2018-01-04 | End: 2019-01-02 | Stop reason: SDUPTHER

## 2018-03-22 RX ORDER — RALOXIFENE HYDROCHLORIDE 60 MG/1
TABLET, FILM COATED ORAL
Qty: 90 TABLET | Refills: 3 | Status: SHIPPED | OUTPATIENT
Start: 2018-03-22 | End: 2019-03-18 | Stop reason: SDUPTHER

## 2018-04-06 ENCOUNTER — TELEPHONE (OUTPATIENT)
Dept: FAMILY MEDICINE CLINIC | Age: 78
End: 2018-04-06

## 2018-04-12 ENCOUNTER — OFFICE VISIT (OUTPATIENT)
Dept: FAMILY MEDICINE CLINIC | Age: 78
End: 2018-04-12
Payer: MEDICARE

## 2018-04-12 VITALS
BODY MASS INDEX: 29.77 KG/M2 | TEMPERATURE: 97.9 F | RESPIRATION RATE: 12 BRPM | SYSTOLIC BLOOD PRESSURE: 136 MMHG | HEART RATE: 68 BPM | HEIGHT: 57 IN | DIASTOLIC BLOOD PRESSURE: 84 MMHG | WEIGHT: 138 LBS

## 2018-04-12 DIAGNOSIS — H93.13 TINNITUS OF BOTH EARS: ICD-10-CM

## 2018-04-12 DIAGNOSIS — H72.92 PERFORATED EAR DRUM, LEFT: ICD-10-CM

## 2018-04-12 DIAGNOSIS — F41.9 ANXIETY: Primary | ICD-10-CM

## 2018-04-12 PROBLEM — N39.0 UTI (URINARY TRACT INFECTION): Status: RESOLVED | Noted: 2017-12-03 | Resolved: 2018-04-12

## 2018-04-12 PROCEDURE — 1123F ACP DISCUSS/DSCN MKR DOCD: CPT | Performed by: FAMILY MEDICINE

## 2018-04-12 PROCEDURE — 99213 OFFICE O/P EST LOW 20 MIN: CPT | Performed by: FAMILY MEDICINE

## 2018-04-12 PROCEDURE — G8419 CALC BMI OUT NRM PARAM NOF/U: HCPCS | Performed by: FAMILY MEDICINE

## 2018-04-12 PROCEDURE — G8598 ASA/ANTIPLAT THER USED: HCPCS | Performed by: FAMILY MEDICINE

## 2018-04-12 PROCEDURE — G8427 DOCREV CUR MEDS BY ELIG CLIN: HCPCS | Performed by: FAMILY MEDICINE

## 2018-04-12 PROCEDURE — G8399 PT W/DXA RESULTS DOCUMENT: HCPCS | Performed by: FAMILY MEDICINE

## 2018-04-12 PROCEDURE — 1036F TOBACCO NON-USER: CPT | Performed by: FAMILY MEDICINE

## 2018-04-12 PROCEDURE — 3017F COLORECTAL CA SCREEN DOC REV: CPT | Performed by: FAMILY MEDICINE

## 2018-04-12 PROCEDURE — 1090F PRES/ABSN URINE INCON ASSESS: CPT | Performed by: FAMILY MEDICINE

## 2018-04-12 PROCEDURE — 4040F PNEUMOC VAC/ADMIN/RCVD: CPT | Performed by: FAMILY MEDICINE

## 2018-04-12 RX ORDER — AMOXICILLIN 500 MG/1
500 CAPSULE ORAL 2 TIMES DAILY
Qty: 14 CAPSULE | Refills: 0 | Status: SHIPPED | OUTPATIENT
Start: 2018-04-12 | End: 2018-04-19

## 2018-04-12 RX ORDER — DULOXETIN HYDROCHLORIDE 30 MG/1
30 CAPSULE, DELAYED RELEASE ORAL DAILY
Qty: 30 CAPSULE | Refills: 5 | Status: SHIPPED | OUTPATIENT
Start: 2018-04-12 | End: 2020-07-07 | Stop reason: DRUGHIGH

## 2018-05-10 ENCOUNTER — OFFICE VISIT (OUTPATIENT)
Dept: FAMILY MEDICINE CLINIC | Age: 78
End: 2018-05-10
Payer: MEDICARE

## 2018-05-10 VITALS
HEIGHT: 58 IN | WEIGHT: 133 LBS | SYSTOLIC BLOOD PRESSURE: 124 MMHG | DIASTOLIC BLOOD PRESSURE: 65 MMHG | BODY MASS INDEX: 27.92 KG/M2 | HEART RATE: 72 BPM | TEMPERATURE: 97.7 F | RESPIRATION RATE: 14 BRPM

## 2018-05-10 DIAGNOSIS — H93.13 TINNITUS AURIUM, BILATERAL: Primary | ICD-10-CM

## 2018-05-10 DIAGNOSIS — F41.9 ANXIETY: ICD-10-CM

## 2018-05-10 PROCEDURE — 1090F PRES/ABSN URINE INCON ASSESS: CPT | Performed by: FAMILY MEDICINE

## 2018-05-10 PROCEDURE — G8598 ASA/ANTIPLAT THER USED: HCPCS | Performed by: FAMILY MEDICINE

## 2018-05-10 PROCEDURE — G8419 CALC BMI OUT NRM PARAM NOF/U: HCPCS | Performed by: FAMILY MEDICINE

## 2018-05-10 PROCEDURE — 4040F PNEUMOC VAC/ADMIN/RCVD: CPT | Performed by: FAMILY MEDICINE

## 2018-05-10 PROCEDURE — 1036F TOBACCO NON-USER: CPT | Performed by: FAMILY MEDICINE

## 2018-05-10 PROCEDURE — 3017F COLORECTAL CA SCREEN DOC REV: CPT | Performed by: FAMILY MEDICINE

## 2018-05-10 PROCEDURE — G8427 DOCREV CUR MEDS BY ELIG CLIN: HCPCS | Performed by: FAMILY MEDICINE

## 2018-05-10 PROCEDURE — G8399 PT W/DXA RESULTS DOCUMENT: HCPCS | Performed by: FAMILY MEDICINE

## 2018-05-10 PROCEDURE — 99213 OFFICE O/P EST LOW 20 MIN: CPT | Performed by: FAMILY MEDICINE

## 2018-05-10 PROCEDURE — 1123F ACP DISCUSS/DSCN MKR DOCD: CPT | Performed by: FAMILY MEDICINE

## 2018-06-08 ENCOUNTER — HOSPITAL ENCOUNTER (EMERGENCY)
Age: 78
Discharge: HOME | End: 2018-06-09
Attending: EMERGENCY MEDICINE
Payer: MEDICARE

## 2018-06-08 DIAGNOSIS — R30.0 DYSURIA: Primary | ICD-10-CM

## 2018-06-08 LAB
ANION GAP SERPL CALCULATED.3IONS-SCNC: 13 MEQ/L (ref 8–16)
ANISOCYTOSIS: ABNORMAL
BACTERIA: ABNORMAL /HPF
BASOPHILS # BLD: 0.5 %
BASOPHILS ABSOLUTE: 0 THOU/MM3 (ref 0–0.1)
BILIRUBIN URINE: NEGATIVE
BLOOD, URINE: ABNORMAL
BUN BLDV-MCNC: 15 MG/DL (ref 7–22)
CALCIUM SERPL-MCNC: 8.3 MG/DL (ref 8.5–10.5)
CASTS 2: ABNORMAL /LPF
CASTS UA: ABNORMAL /LPF
CHARACTER, URINE: ABNORMAL
CHLORIDE BLD-SCNC: 99 MEQ/L (ref 98–111)
CO2: 23 MEQ/L (ref 23–33)
COLOR: YELLOW
CREAT SERPL-MCNC: 0.7 MG/DL (ref 0.4–1.2)
CRYSTALS, UA: ABNORMAL
EOSINOPHIL # BLD: 1 %
EOSINOPHILS ABSOLUTE: 0.1 THOU/MM3 (ref 0–0.4)
EPITHELIAL CELLS, UA: ABNORMAL /HPF
GFR SERPL CREATININE-BSD FRML MDRD: 81 ML/MIN/1.73M2
GLUCOSE BLD-MCNC: 117 MG/DL (ref 70–108)
GLUCOSE URINE: NEGATIVE MG/DL
HCT VFR BLD CALC: 36.1 % (ref 37–47)
HEMOGLOBIN: 12.2 GM/DL (ref 12–16)
KETONES, URINE: NEGATIVE
LEUKOCYTE ESTERASE, URINE: ABNORMAL
LYMPHOCYTES # BLD: 31.9 %
LYMPHOCYTES ABSOLUTE: 2.6 THOU/MM3 (ref 1–4.8)
MACROCYTES: ABNORMAL
MCH RBC QN AUTO: 37.3 PG (ref 27–31)
MCHC RBC AUTO-ENTMCNC: 33.9 GM/DL (ref 33–37)
MCV RBC AUTO: 109.9 FL (ref 81–99)
MISCELLANEOUS 2: ABNORMAL
MONOCYTES # BLD: 3.8 %
MONOCYTES ABSOLUTE: 0.3 THOU/MM3 (ref 0.4–1.3)
NITRITE, URINE: NEGATIVE
NUCLEATED RED BLOOD CELLS: 0 /100 WBC
OSMOLALITY CALCULATION: 272 MOSMOL/KG (ref 275–300)
PDW BLD-RTO: 17.4 % (ref 11.5–14.5)
PH UA: 6
PLATELET # BLD: 285 THOU/MM3 (ref 130–400)
PLATELET ESTIMATE: ADEQUATE
PMV BLD AUTO: 8.4 FL (ref 7.4–10.4)
POIKILOCYTES: SLIGHT
POTASSIUM SERPL-SCNC: 3.3 MEQ/L (ref 3.5–5.2)
PROTEIN UA: NEGATIVE
RBC # BLD: 3.29 MILL/MM3 (ref 4.2–5.4)
RBC URINE: ABNORMAL /HPF
RENAL EPITHELIAL, UA: ABNORMAL
SCAN OF BLOOD SMEAR: NORMAL
SEG NEUTROPHILS: 62.8 %
SEGMENTED NEUTROPHILS ABSOLUTE COUNT: 5 THOU/MM3 (ref 1.8–7.7)
SODIUM BLD-SCNC: 135 MEQ/L (ref 135–145)
SPECIFIC GRAVITY, URINE: < 1.005 (ref 1–1.03)
TARGET CELLS: ABNORMAL
UROBILINOGEN, URINE: 0.2 EU/DL
WBC # BLD: 8 THOU/MM3 (ref 4.8–10.8)
WBC UA: > 100 /HPF
YEAST: ABNORMAL

## 2018-06-08 PROCEDURE — 87186 SC STD MICRODIL/AGAR DIL: CPT

## 2018-06-08 PROCEDURE — 80048 BASIC METABOLIC PNL TOTAL CA: CPT

## 2018-06-08 PROCEDURE — 85025 COMPLETE CBC W/AUTO DIFF WBC: CPT

## 2018-06-08 PROCEDURE — 99283 EMERGENCY DEPT VISIT LOW MDM: CPT

## 2018-06-08 PROCEDURE — 87086 URINE CULTURE/COLONY COUNT: CPT

## 2018-06-08 PROCEDURE — 81001 URINALYSIS AUTO W/SCOPE: CPT

## 2018-06-08 PROCEDURE — 87184 SC STD DISK METHOD PER PLATE: CPT

## 2018-06-08 PROCEDURE — 36415 COLL VENOUS BLD VENIPUNCTURE: CPT

## 2018-06-08 PROCEDURE — 87077 CULTURE AEROBIC IDENTIFY: CPT

## 2018-06-08 RX ORDER — POTASSIUM CHLORIDE 20 MEQ/1
TABLET, EXTENDED RELEASE ORAL
Qty: 90 TABLET | Refills: 2 | OUTPATIENT
Start: 2018-06-08

## 2018-06-08 ASSESSMENT — PAIN SCALES - GENERAL
PAINLEVEL_OUTOF10: 8
PAINLEVEL_OUTOF10: 7

## 2018-06-08 ASSESSMENT — PAIN DESCRIPTION - PAIN TYPE
TYPE: ACUTE PAIN
TYPE: ACUTE PAIN

## 2018-06-08 ASSESSMENT — PAIN DESCRIPTION - FREQUENCY
FREQUENCY: INTERMITTENT
FREQUENCY: CONTINUOUS

## 2018-06-08 ASSESSMENT — PAIN DESCRIPTION - ONSET: ONSET: ON-GOING

## 2018-06-08 ASSESSMENT — PAIN DESCRIPTION - LOCATION: LOCATION: OTHER (COMMENT);PERINEUM

## 2018-06-08 ASSESSMENT — PAIN DESCRIPTION - DESCRIPTORS
DESCRIPTORS: BURNING
DESCRIPTORS: ACHING

## 2018-06-09 VITALS
OXYGEN SATURATION: 98 % | HEIGHT: 59 IN | TEMPERATURE: 97.5 F | RESPIRATION RATE: 17 BRPM | SYSTOLIC BLOOD PRESSURE: 134 MMHG | WEIGHT: 145 LBS | DIASTOLIC BLOOD PRESSURE: 77 MMHG | HEART RATE: 51 BPM | BODY MASS INDEX: 29.23 KG/M2

## 2018-06-09 RX ORDER — NITROFURANTOIN 25; 75 MG/1; MG/1
100 CAPSULE ORAL 2 TIMES DAILY
Qty: 14 CAPSULE | Refills: 0 | Status: SHIPPED | OUTPATIENT
Start: 2018-06-09 | End: 2018-06-16

## 2018-06-09 RX ORDER — PHENAZOPYRIDINE HYDROCHLORIDE 200 MG/1
200 TABLET, FILM COATED ORAL 3 TIMES DAILY PRN
Qty: 15 TABLET | Refills: 0 | Status: SHIPPED | OUTPATIENT
Start: 2018-06-09 | End: 2018-06-12

## 2018-06-09 ASSESSMENT — ENCOUNTER SYMPTOMS
SHORTNESS OF BREATH: 0
VOMITING: 0
ABDOMINAL PAIN: 0
NAUSEA: 0
COUGH: 0
DIARRHEA: 0
SORE THROAT: 0
WHEEZING: 0
BLOOD IN STOOL: 0
BACK PAIN: 0

## 2018-06-09 ASSESSMENT — PAIN DESCRIPTION - DESCRIPTORS: DESCRIPTORS: BURNING

## 2018-06-09 ASSESSMENT — PAIN DESCRIPTION - PAIN TYPE: TYPE: ACUTE PAIN

## 2018-06-09 ASSESSMENT — PAIN DESCRIPTION - FREQUENCY: FREQUENCY: CONTINUOUS

## 2018-06-09 ASSESSMENT — PAIN SCALES - GENERAL: PAINLEVEL_OUTOF10: 8

## 2018-06-10 LAB
ORGANISM: ABNORMAL
URINE CULTURE REFLEX: ABNORMAL

## 2018-06-11 RX ORDER — POTASSIUM CHLORIDE 20 MEQ/1
TABLET, EXTENDED RELEASE ORAL
Qty: 90 TABLET | Refills: 2 | Status: SHIPPED | OUTPATIENT
Start: 2018-06-11 | End: 2019-03-11

## 2018-06-12 ENCOUNTER — TELEPHONE (OUTPATIENT)
Dept: FAMILY MEDICINE CLINIC | Age: 78
End: 2018-06-12

## 2018-06-12 DIAGNOSIS — I25.10 CORONARY ARTERY DISEASE INVOLVING NATIVE CORONARY ARTERY OF NATIVE HEART WITHOUT ANGINA PECTORIS: Primary | ICD-10-CM

## 2018-06-15 ENCOUNTER — TELEPHONE (OUTPATIENT)
Dept: FAMILY MEDICINE CLINIC | Age: 78
End: 2018-06-15

## 2018-06-28 ENCOUNTER — HOSPITAL ENCOUNTER (EMERGENCY)
Dept: GENERAL RADIOLOGY | Age: 78
Discharge: HOME | End: 2018-06-28
Payer: MEDICARE

## 2018-06-28 ENCOUNTER — TELEPHONE (OUTPATIENT)
Dept: FAMILY MEDICINE CLINIC | Age: 78
End: 2018-06-28

## 2018-06-28 ENCOUNTER — HOSPITAL ENCOUNTER (EMERGENCY)
Age: 78
Discharge: HOME | End: 2018-06-28
Attending: EMERGENCY MEDICINE
Payer: MEDICARE

## 2018-06-28 VITALS
DIASTOLIC BLOOD PRESSURE: 71 MMHG | SYSTOLIC BLOOD PRESSURE: 154 MMHG | OXYGEN SATURATION: 99 % | RESPIRATION RATE: 16 BRPM | TEMPERATURE: 98.4 F | HEART RATE: 72 BPM | BODY MASS INDEX: 26.26 KG/M2 | WEIGHT: 130 LBS

## 2018-06-28 DIAGNOSIS — M77.51 TENDINITIS OF RIGHT FOOT: ICD-10-CM

## 2018-06-28 DIAGNOSIS — S93.601A SPRAIN OF RIGHT FOOT, INITIAL ENCOUNTER: ICD-10-CM

## 2018-06-28 DIAGNOSIS — S96.911A STRAIN OF RIGHT FOOT, INITIAL ENCOUNTER: Primary | ICD-10-CM

## 2018-06-28 PROCEDURE — 99213 OFFICE O/P EST LOW 20 MIN: CPT | Performed by: EMERGENCY MEDICINE

## 2018-06-28 PROCEDURE — 99214 OFFICE O/P EST MOD 30 MIN: CPT

## 2018-06-28 PROCEDURE — 73630 X-RAY EXAM OF FOOT: CPT

## 2018-06-28 RX ORDER — TRAMADOL HYDROCHLORIDE 50 MG/1
50 TABLET ORAL EVERY 6 HOURS PRN
Qty: 10 TABLET | Refills: 0 | Status: SHIPPED | OUTPATIENT
Start: 2018-06-28 | End: 2018-06-30

## 2018-06-28 ASSESSMENT — ENCOUNTER SYMPTOMS
CHOKING: 0
TROUBLE SWALLOWING: 0
SORE THROAT: 0
EYE PAIN: 0
DIARRHEA: 0
EYE REDNESS: 0
SINUS PRESSURE: 0
STRIDOR: 0
NAUSEA: 0
ABDOMINAL PAIN: 0
COUGH: 0
BLOOD IN STOOL: 0
FACIAL SWELLING: 0
VOICE CHANGE: 0
EYE DISCHARGE: 0
VOMITING: 0
WHEEZING: 0
SHORTNESS OF BREATH: 0
BACK PAIN: 0
CONSTIPATION: 0

## 2018-06-28 ASSESSMENT — PAIN DESCRIPTION - ORIENTATION: ORIENTATION: RIGHT

## 2018-06-28 ASSESSMENT — PAIN DESCRIPTION - PAIN TYPE: TYPE: ACUTE PAIN

## 2018-06-28 ASSESSMENT — PAIN DESCRIPTION - DESCRIPTORS: DESCRIPTORS: ACHING

## 2018-06-28 ASSESSMENT — PAIN DESCRIPTION - FREQUENCY: FREQUENCY: INTERMITTENT

## 2018-06-28 ASSESSMENT — PAIN DESCRIPTION - LOCATION: LOCATION: TOE (COMMENT WHICH ONE)

## 2018-06-28 ASSESSMENT — PAIN SCALES - GENERAL: PAINLEVEL_OUTOF10: 8

## 2018-07-31 ENCOUNTER — APPOINTMENT (OUTPATIENT)
Dept: CT IMAGING | Age: 78
End: 2018-07-31
Payer: MEDICARE

## 2018-07-31 ENCOUNTER — HOSPITAL ENCOUNTER (EMERGENCY)
Age: 78
Discharge: HOME | End: 2018-07-31
Attending: FAMILY MEDICINE
Payer: MEDICARE

## 2018-07-31 VITALS
WEIGHT: 150 LBS | BODY MASS INDEX: 31.49 KG/M2 | HEART RATE: 60 BPM | OXYGEN SATURATION: 97 % | TEMPERATURE: 98.5 F | DIASTOLIC BLOOD PRESSURE: 71 MMHG | HEIGHT: 58 IN | RESPIRATION RATE: 18 BRPM | SYSTOLIC BLOOD PRESSURE: 157 MMHG
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
## 2018-07-31 LAB
ANION GAP SERPL CALCULATED.3IONS-SCNC: 10 MEQ/L (ref 8–16)
BASOPHILS # BLD: 0.4 %
BASOPHILS ABSOLUTE: 0 THOU/MM3 (ref 0–0.1)
BUN BLDV-MCNC: 14 MG/DL (ref 7–22)
CALCIUM SERPL-MCNC: 8.9 MG/DL (ref 8.5–10.5)
CHLORIDE BLD-SCNC: 100 MEQ/L (ref 98–111)
CO2: 26 MEQ/L (ref 23–33)
CREAT SERPL-MCNC: 0.8 MG/DL (ref 0.4–1.2)
EOSINOPHIL # BLD: 0.5 %
EOSINOPHILS ABSOLUTE: 0 THOU/MM3 (ref 0–0.4)
ERYTHROCYTE [DISTWIDTH] IN BLOOD BY AUTOMATED COUNT: 14 % (ref 11.5–14.5)
ERYTHROCYTE [DISTWIDTH] IN BLOOD BY AUTOMATED COUNT: 56.8 FL (ref 35–45)
GFR SERPL CREATININE-BSD FRML MDRD: 70 ML/MIN/1.73M2
GLUCOSE BLD-MCNC: 86 MG/DL (ref 70–108)
HCT VFR BLD CALC: 38.9 % (ref 37–47)
HEMOGLOBIN: 13 GM/DL (ref 12–16)
IMMATURE GRANS (ABS): 0.05 THOU/MM3 (ref 0–0.07)
IMMATURE GRANULOCYTES: 0.5 %
LYMPHOCYTES # BLD: 20.4 %
LYMPHOCYTES ABSOLUTE: 2 THOU/MM3 (ref 1–4.8)
MCH RBC QN AUTO: 36.6 PG (ref 26–33)
MCHC RBC AUTO-ENTMCNC: 33.4 GM/DL (ref 32.2–35.5)
MCV RBC AUTO: 109.6 FL (ref 81–99)
MONOCYTES # BLD: 6 %
MONOCYTES ABSOLUTE: 0.6 THOU/MM3 (ref 0.4–1.3)
NUCLEATED RED BLOOD CELLS: 0 /100 WBC
OSMOLALITY CALCULATION: 271.7 MOSMOL/KG (ref 275–300)
PLATELET # BLD: 330 THOU/MM3 (ref 130–400)
PMV BLD AUTO: 9.7 FL (ref 9.4–12.4)
POTASSIUM SERPL-SCNC: 3.9 MEQ/L (ref 3.5–5.2)
RBC # BLD: 3.55 MILL/MM3 (ref 4.2–5.4)
SEG NEUTROPHILS: 72.2 %
SEGMENTED NEUTROPHILS ABSOLUTE COUNT: 6.9 THOU/MM3 (ref 1.8–7.7)
SODIUM BLD-SCNC: 136 MEQ/L (ref 135–145)
WBC # BLD: 9.6 THOU/MM3 (ref 4.8–10.8)

## 2018-07-31 PROCEDURE — 6370000000 HC RX 637 (ALT 250 FOR IP)

## 2018-07-31 PROCEDURE — 76376 3D RENDER W/INTRP POSTPROCES: CPT

## 2018-07-31 PROCEDURE — 74176 CT ABD & PELVIS W/O CONTRAST: CPT

## 2018-07-31 PROCEDURE — 85025 COMPLETE CBC W/AUTO DIFF WBC: CPT

## 2018-07-31 PROCEDURE — 99284 EMERGENCY DEPT VISIT MOD MDM: CPT

## 2018-07-31 PROCEDURE — 80048 BASIC METABOLIC PNL TOTAL CA: CPT

## 2018-07-31 PROCEDURE — 71250 CT THORAX DX C-: CPT

## 2018-07-31 PROCEDURE — 36415 COLL VENOUS BLD VENIPUNCTURE: CPT

## 2018-07-31 RX ORDER — HYDROCODONE BITARTRATE AND ACETAMINOPHEN 5; 325 MG/1; MG/1
1 TABLET ORAL ONCE
Status: COMPLETED | OUTPATIENT
Start: 2018-07-31 | End: 2018-07-31

## 2018-07-31 RX ORDER — HYDROCODONE BITARTRATE AND ACETAMINOPHEN 5; 325 MG/1; MG/1
TABLET ORAL
Status: COMPLETED
Start: 2018-07-31 | End: 2018-07-31

## 2018-07-31 RX ORDER — ACETAMINOPHEN 325 MG/1
650 TABLET ORAL ONCE
Status: DISCONTINUED | OUTPATIENT
Start: 2018-07-31 | End: 2018-08-01 | Stop reason: HOSPADM

## 2018-07-31 RX ORDER — ACETAMINOPHEN 325 MG/1
TABLET ORAL
Status: DISCONTINUED
Start: 2018-07-31 | End: 2018-07-31 | Stop reason: WASHOUT

## 2018-07-31 RX ADMIN — HYDROCODONE BITARTRATE AND ACETAMINOPHEN 1 TABLET: 5; 325 TABLET ORAL at 22:19

## 2018-07-31 ASSESSMENT — PAIN DESCRIPTION - DESCRIPTORS: DESCRIPTORS: SHARP

## 2018-07-31 ASSESSMENT — ENCOUNTER SYMPTOMS
RHINORRHEA: 0
ABDOMINAL PAIN: 0
COUGH: 0
VOMITING: 0
WHEEZING: 0
SORE THROAT: 0
EYE PAIN: 0
EYE DISCHARGE: 0
NAUSEA: 0
BACK PAIN: 1
SHORTNESS OF BREATH: 0
DIARRHEA: 0

## 2018-07-31 ASSESSMENT — PAIN DESCRIPTION - ORIENTATION: ORIENTATION: MID

## 2018-07-31 ASSESSMENT — PAIN SCALES - GENERAL
PAINLEVEL_OUTOF10: 10
PAINLEVEL_OUTOF10: 10

## 2018-07-31 ASSESSMENT — PAIN DESCRIPTION - PAIN TYPE: TYPE: ACUTE PAIN

## 2018-07-31 ASSESSMENT — PAIN DESCRIPTION - LOCATION: LOCATION: BACK

## 2018-07-31 NOTE — ED PROVIDER NOTES
indicated that her mother is . She indicated that her father is . She indicated that her maternal grandmother is . She indicated that her maternal grandfather is . She indicated that her paternal grandmother is . She indicated that her paternal grandfather is . family history includes Asthma in her father; Cancer in her mother. SOCIAL HISTORY      reports that she has never smoked. She has never used smokeless tobacco. She reports that she does not drink alcohol or use drugs. PHYSICAL EXAM     INITIAL VITALS:  height is 4' 10\" (1.473 m) and weight is 150 lb (68 kg). Her oral temperature is 98.5 °F (36.9 °C). Her blood pressure is 157/71 (abnormal) and her pulse is 60. Her respiration is 18 and oxygen saturation is 97%. Physical Exam   Constitutional: She is oriented to person, place, and time. She appears well-developed and well-nourished. HENT:   Head: Normocephalic and atraumatic. Right Ear: External ear normal.   Left Ear: External ear normal.   Eyes: Conjunctivae are normal. Right eye exhibits no discharge. Left eye exhibits no discharge. No scleral icterus. Neck: Normal range of motion. Neck supple. No JVD present. Cardiovascular: Normal rate, regular rhythm and normal heart sounds. Pulmonary/Chest: Effort normal and breath sounds normal. No stridor. No respiratory distress. She has no decreased breath sounds. She has no wheezes. She has no rhonchi. She has no rales. Abdominal: Soft. She exhibits no distension. Musculoskeletal: Normal range of motion. She exhibits no edema. Cervical back: Normal. She exhibits normal range of motion, no tenderness and no bony tenderness. Thoracic back: She exhibits bony tenderness (Lower thoracic). Lumbar back: She exhibits bony tenderness (Mid lumbar). Neurological: She is alert and oriented to person, place, and time. She exhibits normal muscle tone. GCS eye subscore is 4.  GCS

## 2018-08-01 ENCOUNTER — OFFICE VISIT (OUTPATIENT)
Dept: FAMILY MEDICINE CLINIC | Age: 78
End: 2018-08-01
Payer: MEDICARE

## 2018-08-01 VITALS
SYSTOLIC BLOOD PRESSURE: 129 MMHG | WEIGHT: 129.9 LBS | BODY MASS INDEX: 27.27 KG/M2 | RESPIRATION RATE: 16 BRPM | TEMPERATURE: 97.5 F | HEIGHT: 58 IN | HEART RATE: 67 BPM | DIASTOLIC BLOOD PRESSURE: 62 MMHG

## 2018-08-01 DIAGNOSIS — F33.41 RECURRENT MAJOR DEPRESSIVE DISORDER, IN PARTIAL REMISSION (HCC): ICD-10-CM

## 2018-08-01 DIAGNOSIS — S30.0XXA LUMBAR CONTUSION, INITIAL ENCOUNTER: ICD-10-CM
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
## 2018-08-01 DIAGNOSIS — F03.90 DEMENTIA WITHOUT BEHAVIORAL DISTURBANCE, UNSPECIFIED DEMENTIA TYPE: ICD-10-CM

## 2018-08-01 PROCEDURE — 4040F PNEUMOC VAC/ADMIN/RCVD: CPT | Performed by: FAMILY MEDICINE

## 2018-08-01 PROCEDURE — 99214 OFFICE O/P EST MOD 30 MIN: CPT | Performed by: FAMILY MEDICINE

## 2018-08-01 PROCEDURE — 1123F ACP DISCUSS/DSCN MKR DOCD: CPT | Performed by: FAMILY MEDICINE

## 2018-08-01 PROCEDURE — G8399 PT W/DXA RESULTS DOCUMENT: HCPCS | Performed by: FAMILY MEDICINE

## 2018-08-01 PROCEDURE — G8427 DOCREV CUR MEDS BY ELIG CLIN: HCPCS | Performed by: FAMILY MEDICINE

## 2018-08-01 PROCEDURE — G8419 CALC BMI OUT NRM PARAM NOF/U: HCPCS | Performed by: FAMILY MEDICINE

## 2018-08-01 PROCEDURE — 1101F PT FALLS ASSESS-DOCD LE1/YR: CPT | Performed by: FAMILY MEDICINE

## 2018-08-01 PROCEDURE — 1036F TOBACCO NON-USER: CPT | Performed by: FAMILY MEDICINE

## 2018-08-01 PROCEDURE — 1090F PRES/ABSN URINE INCON ASSESS: CPT | Performed by: FAMILY MEDICINE

## 2018-08-01 PROCEDURE — G8598 ASA/ANTIPLAT THER USED: HCPCS | Performed by: FAMILY MEDICINE

## 2018-08-01 RX ORDER — HYDROCODONE BITARTRATE AND ACETAMINOPHEN 5; 325 MG/1; MG/1
1 TABLET ORAL EVERY 8 HOURS PRN
Qty: 15 TABLET | Refills: 0 | Status: SHIPPED | OUTPATIENT
Start: 2018-08-01 | End: 2018-08-06

## 2018-08-09 DIAGNOSIS — N39.41 URGE INCONTINENCE: ICD-10-CM

## 2018-08-09 RX ORDER — OXYBUTYNIN CHLORIDE 10 MG/1
TABLET, EXTENDED RELEASE ORAL
Qty: 90 TABLET | Refills: 3 | OUTPATIENT
Start: 2018-08-09

## 2018-10-08 ENCOUNTER — NURSE ONLY (OUTPATIENT)
Dept: FAMILY MEDICINE CLINIC | Age: 78
End: 2018-10-08
Payer: MEDICARE

## 2018-10-08 ENCOUNTER — TELEPHONE (OUTPATIENT)
Dept: FAMILY MEDICINE CLINIC | Age: 78
End: 2018-10-08

## 2018-10-08 DIAGNOSIS — R30.0 DYSURIA: Primary | ICD-10-CM

## 2018-10-08 DIAGNOSIS — R35.0 URINARY FREQUENCY: Primary | ICD-10-CM

## 2018-10-08 LAB
BILIRUBIN, POC: NEGATIVE
BLOOD URINE, POC: NORMAL
CLARITY, POC: CLEAR
COLOR, POC: YELLOW
GLUCOSE URINE, POC: NEGATIVE
KETONES, POC: NEGATIVE
LEUKOCYTE EST, POC: NORMAL
NITRITE, POC: NEGATIVE
PH, POC: 5.5
PROTEIN, POC: NEGATIVE
SPECIFIC GRAVITY, POC: <=1.005
UROBILINOGEN, POC: 0.2

## 2018-10-08 PROCEDURE — 81003 URINALYSIS AUTO W/O SCOPE: CPT | Performed by: FAMILY MEDICINE

## 2018-10-08 RX ORDER — SULFAMETHOXAZOLE AND TRIMETHOPRIM 800; 160 MG/1; MG/1
1 TABLET ORAL 2 TIMES DAILY
Qty: 6 TABLET | Refills: 0 | Status: SHIPPED | OUTPATIENT
Start: 2018-10-08 | End: 2018-10-11

## 2018-10-10 LAB
ORGANISM: ABNORMAL
URINE CULTURE, ROUTINE: ABNORMAL

## 2018-10-23 ENCOUNTER — CARE COORDINATION (OUTPATIENT)
Dept: CARE COORDINATION | Age: 78
End: 2018-10-23

## 2018-12-01 ENCOUNTER — HOSPITAL ENCOUNTER (EMERGENCY)
Age: 78
Discharge: HOME | End: 2018-12-01
Attending: EMERGENCY MEDICINE
Payer: MEDICARE

## 2018-12-01 VITALS
BODY MASS INDEX: 21.27 KG/M2 | TEMPERATURE: 98.6 F | SYSTOLIC BLOOD PRESSURE: 147 MMHG | WEIGHT: 100 LBS | HEART RATE: 66 BPM | OXYGEN SATURATION: 99 % | RESPIRATION RATE: 18 BRPM | DIASTOLIC BLOOD PRESSURE: 79 MMHG

## 2018-12-01 DIAGNOSIS — S50.811A ABRASION OF RIGHT FOREARM, INITIAL ENCOUNTER: Primary | ICD-10-CM

## 2018-12-01 DIAGNOSIS — S51.801A AVULSION OF SKIN OF RIGHT FOREARM, INITIAL ENCOUNTER: ICD-10-CM

## 2018-12-01 PROCEDURE — 2709999900 HC NON-CHARGEABLE SUPPLY

## 2018-12-01 PROCEDURE — 99213 OFFICE O/P EST LOW 20 MIN: CPT | Performed by: EMERGENCY MEDICINE

## 2018-12-01 PROCEDURE — 99212 OFFICE O/P EST SF 10 MIN: CPT

## 2018-12-01 PROCEDURE — 6370000000 HC RX 637 (ALT 250 FOR IP): Performed by: EMERGENCY MEDICINE

## 2018-12-01 RX ORDER — GINSENG 100 MG
CAPSULE ORAL ONCE
Status: COMPLETED | OUTPATIENT
Start: 2018-12-01 | End: 2018-12-01

## 2018-12-01 RX ADMIN — BACITRACIN: 500 OINTMENT TOPICAL at 12:34

## 2018-12-01 ASSESSMENT — ENCOUNTER SYMPTOMS
BACK PAIN: 0
SINUS PRESSURE: 0
SORE THROAT: 0
STRIDOR: 0
COUGH: 0
BLOOD IN STOOL: 0
WHEEZING: 0
VOICE CHANGE: 0
CONSTIPATION: 0
EYE REDNESS: 0
EYE PAIN: 0
DIARRHEA: 0
ABDOMINAL PAIN: 0
NAUSEA: 0
TROUBLE SWALLOWING: 0
EYE DISCHARGE: 0
CHOKING: 0
VOMITING: 0
FACIAL SWELLING: 0
SHORTNESS OF BREATH: 0

## 2018-12-01 ASSESSMENT — PAIN SCALES - GENERAL: PAINLEVEL_OUTOF10: 10

## 2018-12-01 ASSESSMENT — PAIN DESCRIPTION - DESCRIPTORS: DESCRIPTORS: BURNING

## 2018-12-01 ASSESSMENT — PAIN DESCRIPTION - ORIENTATION: ORIENTATION: RIGHT

## 2018-12-01 ASSESSMENT — PAIN DESCRIPTION - LOCATION: LOCATION: ARM

## 2018-12-01 ASSESSMENT — PAIN DESCRIPTION - PAIN TYPE: TYPE: ACUTE PAIN

## 2018-12-01 NOTE — ED PROVIDER NOTES
[address]       Chief Complaint   Patient presents with    Abrasion     (R)Forearm       Nurses Notes reviewed and I agree except as noted in the HPI. HISTORY OF PRESENT ILLNESS   Cooper Serrato is a 70 y.o. female who presents one hour after she sustained avulsion and abrasion to her right forearm when she was at home in [facility], and her dog jumped up and clawed her right forearm. There was minimal bleeding. No fall to the ground. She denies motor sensory deficits. No dog bite. She states his a burning discomfort she rates at 10/10 severity. Up-to-date tetanus. No history of diabetes. Right-hand dominant. REVIEW OF SYSTEMS     Review of Systems   Constitutional: Negative for appetite change, chills, fatigue, fever and unexpected weight change. HENT: Negative for congestion, ear discharge, ear pain, facial swelling, hearing loss, nosebleeds, postnasal drip, sinus pressure, sore throat, trouble swallowing and voice change. Eyes: Negative for pain, discharge, redness and visual disturbance. Respiratory: Negative for cough, choking, shortness of breath, wheezing and stridor. Cardiovascular: Negative for chest pain and leg swelling. Gastrointestinal: Negative for abdominal pain, blood in stool, constipation, diarrhea, nausea and vomiting. Genitourinary: Negative for dysuria, flank pain, frequency, hematuria, urgency, vaginal bleeding and vaginal discharge. Musculoskeletal: Negative for arthralgias, back pain, neck pain and neck stiffness. Skin: Positive for wound. Negative for rash. Accidentally clawed By her dog with avulsion right forearm   Neurological: Negative for dizziness, seizures, syncope, weakness, light-headedness and headaches. Hematological: Negative for adenopathy. Does not bruise/bleed easily. Psychiatric/Behavioral: Negative for confusion, sleep disturbance and suicidal ideas.  The patient is

## 2018-12-03 ENCOUNTER — HOSPITAL ENCOUNTER (EMERGENCY)
Age: 78
Discharge: LEFT BEFORE DISCHARGE | End: 2018-12-03
Payer: MEDICARE

## 2018-12-03 VITALS
TEMPERATURE: 97.9 F | BODY MASS INDEX: 26.21 KG/M2 | OXYGEN SATURATION: 100 % | SYSTOLIC BLOOD PRESSURE: 184 MMHG | HEIGHT: 59 IN | HEART RATE: 68 BPM | DIASTOLIC BLOOD PRESSURE: 103 MMHG | WEIGHT: 130 LBS

## 2018-12-03 DIAGNOSIS — Z51.89 VISIT FOR WOUND CHECK: Primary | ICD-10-CM

## 2018-12-03 PROCEDURE — 99282 EMERGENCY DEPT VISIT SF MDM: CPT

## 2018-12-03 PROCEDURE — 2709999900 HC NON-CHARGEABLE SUPPLY

## 2018-12-03 RX ORDER — LEVOTHYROXINE SODIUM 0.03 MG/1
TABLET ORAL
Qty: 90 TABLET | Refills: 3 | Status: SHIPPED | OUTPATIENT
Start: 2018-12-03

## 2018-12-03 ASSESSMENT — ENCOUNTER SYMPTOMS
EYE DISCHARGE: 0
DIARRHEA: 0
VOMITING: 0
SORE THROAT: 0
EYE PAIN: 0
ABDOMINAL PAIN: 0
WHEEZING: 0
RHINORRHEA: 0
NAUSEA: 0
COUGH: 0
BACK PAIN: 0
SHORTNESS OF BREATH: 0

## 2018-12-03 ASSESSMENT — PAIN SCALES - GENERAL: PAINLEVEL_OUTOF10: 5

## 2018-12-03 ASSESSMENT — PAIN DESCRIPTION - LOCATION: LOCATION: WRIST

## 2018-12-03 ASSESSMENT — PAIN DESCRIPTION - DESCRIPTORS: DESCRIPTORS: BURNING

## 2018-12-03 ASSESSMENT — PAIN DESCRIPTION - ORIENTATION: ORIENTATION: RIGHT

## 2018-12-04 NOTE — ED PROVIDER NOTES
Place 1 patch onto the skin daily. aspirin 81 MG EC tablet Take 81 mg by mouth daily. ALLERGIES     is allergic to iodides; iodine; morphine; motrin [ibuprofen micronized]; and percocet [oxycodone-acetaminophen]. FAMILY HISTORY     indicated that her mother is . She indicated that her father is . She indicated that her maternal grandmother is . She indicated that her maternal grandfather is . She indicated that her paternal grandmother is . She indicated that her paternal grandfather is . family history includes Asthma in her father; Cancer in her mother. SOCIAL HISTORY      reports that she has never smoked. She has never used smokeless tobacco. She reports that she does not drink alcohol or use drugs. PHYSICAL EXAM     INITIAL VITALS:  height is 4' 11\" (1.499 m) and weight is 130 lb (59 kg). Her temperature is 97.9 °F (36.6 °C). Her blood pressure is 184/103 (abnormal) and her pulse is 68. Her oxygen saturation is 100%. Physical Exam   Constitutional: She is oriented to person, place, and time. She appears well-developed and well-nourished. Non-toxic appearance. HENT:   Head: Normocephalic and atraumatic. Right Ear: Tympanic membrane and external ear normal.   Left Ear: Tympanic membrane and external ear normal.   Nose: Nose normal.   Mouth/Throat: Oropharynx is clear and moist and mucous membranes are normal. No oropharyngeal exudate, posterior oropharyngeal edema or posterior oropharyngeal erythema. Eyes: Conjunctivae and EOM are normal.   Neck: Normal range of motion. Neck supple. No JVD present. Cardiovascular: Normal rate, regular rhythm, normal heart sounds, intact distal pulses and normal pulses. Exam reveals no gallop and no friction rub. No murmur heard. Pulmonary/Chest: Effort normal and breath sounds normal. No respiratory distress. She has no decreased breath sounds. She has no wheezes. She has no rhonchi.

## 2018-12-06 ENCOUNTER — OFFICE VISIT (OUTPATIENT)
Dept: FAMILY MEDICINE CLINIC | Age: 78
End: 2018-12-06
Payer: MEDICARE

## 2018-12-06 VITALS
DIASTOLIC BLOOD PRESSURE: 60 MMHG | HEART RATE: 64 BPM | HEIGHT: 59 IN | BODY MASS INDEX: 25.4 KG/M2 | SYSTOLIC BLOOD PRESSURE: 116 MMHG | TEMPERATURE: 98 F | WEIGHT: 126 LBS | RESPIRATION RATE: 16 BRPM

## 2018-12-06 DIAGNOSIS — L03.113 CELLULITIS OF RIGHT UPPER EXTREMITY: Primary | ICD-10-CM

## 2018-12-06 DIAGNOSIS — L30.4 INTERTRIGO: ICD-10-CM

## 2018-12-06 PROCEDURE — G8598 ASA/ANTIPLAT THER USED: HCPCS | Performed by: FAMILY MEDICINE

## 2018-12-06 PROCEDURE — 99214 OFFICE O/P EST MOD 30 MIN: CPT | Performed by: FAMILY MEDICINE

## 2018-12-06 PROCEDURE — 1101F PT FALLS ASSESS-DOCD LE1/YR: CPT | Performed by: FAMILY MEDICINE

## 2018-12-06 PROCEDURE — G8419 CALC BMI OUT NRM PARAM NOF/U: HCPCS | Performed by: FAMILY MEDICINE

## 2018-12-06 PROCEDURE — 1123F ACP DISCUSS/DSCN MKR DOCD: CPT | Performed by: FAMILY MEDICINE

## 2018-12-06 PROCEDURE — 1090F PRES/ABSN URINE INCON ASSESS: CPT | Performed by: FAMILY MEDICINE

## 2018-12-06 PROCEDURE — G8427 DOCREV CUR MEDS BY ELIG CLIN: HCPCS | Performed by: FAMILY MEDICINE

## 2018-12-06 PROCEDURE — 4040F PNEUMOC VAC/ADMIN/RCVD: CPT | Performed by: FAMILY MEDICINE

## 2018-12-06 PROCEDURE — G8484 FLU IMMUNIZE NO ADMIN: HCPCS | Performed by: FAMILY MEDICINE

## 2018-12-06 PROCEDURE — G8399 PT W/DXA RESULTS DOCUMENT: HCPCS | Performed by: FAMILY MEDICINE

## 2018-12-06 PROCEDURE — 1036F TOBACCO NON-USER: CPT | Performed by: FAMILY MEDICINE

## 2018-12-06 RX ORDER — CEPHALEXIN 500 MG/1
1000 CAPSULE ORAL 2 TIMES DAILY
Qty: 40 CAPSULE | Refills: 0 | Status: SHIPPED | OUTPATIENT
Start: 2018-12-06 | End: 2019-05-22

## 2018-12-06 RX ORDER — KETOCONAZOLE 20 MG/G
CREAM TOPICAL
Qty: 30 G | Refills: 1 | Status: SHIPPED | OUTPATIENT
Start: 2018-12-06 | End: 2020-07-06

## 2018-12-06 RX ORDER — SULFAMETHOXAZOLE AND TRIMETHOPRIM 800; 160 MG/1; MG/1
1 TABLET ORAL 2 TIMES DAILY
Qty: 20 TABLET | Refills: 0 | Status: SHIPPED | OUTPATIENT
Start: 2018-12-06 | End: 2018-12-16

## 2019-01-02 RX ORDER — ATORVASTATIN CALCIUM 40 MG/1
TABLET, FILM COATED ORAL
Qty: 90 TABLET | Refills: 3 | Status: SHIPPED | OUTPATIENT
Start: 2019-01-02 | End: 2019-12-30

## 2019-03-11 ENCOUNTER — TELEPHONE (OUTPATIENT)
Dept: FAMILY MEDICINE CLINIC | Age: 79
End: 2019-03-11

## 2019-03-11 RX ORDER — POTASSIUM CHLORIDE 20 MEQ/1
TABLET, EXTENDED RELEASE ORAL
Qty: 90 TABLET | Refills: 2 | Status: SHIPPED | OUTPATIENT
Start: 2019-03-11 | End: 2019-12-06 | Stop reason: SDUPTHER

## 2019-03-18 RX ORDER — RALOXIFENE HYDROCHLORIDE 60 MG/1
TABLET, FILM COATED ORAL
Qty: 90 TABLET | Refills: 3 | Status: SHIPPED | OUTPATIENT
Start: 2019-03-18 | End: 2020-03-12

## 2019-05-22 ENCOUNTER — OFFICE VISIT (OUTPATIENT)
Dept: FAMILY MEDICINE CLINIC | Age: 79
End: 2019-05-22
Payer: MEDICARE

## 2019-05-22 ENCOUNTER — NURSE ONLY (OUTPATIENT)
Dept: LAB | Age: 79
End: 2019-05-22

## 2019-05-22 VITALS
DIASTOLIC BLOOD PRESSURE: 62 MMHG | HEART RATE: 68 BPM | BODY MASS INDEX: 29.18 KG/M2 | HEIGHT: 58 IN | WEIGHT: 139 LBS | TEMPERATURE: 97.6 F | RESPIRATION RATE: 12 BRPM | SYSTOLIC BLOOD PRESSURE: 128 MMHG

## 2019-05-22 DIAGNOSIS — F33.41 RECURRENT MAJOR DEPRESSIVE DISORDER IN PARTIAL REMISSION (HCC): ICD-10-CM

## 2019-05-22 DIAGNOSIS — I10 ESSENTIAL HYPERTENSION: ICD-10-CM

## 2019-05-22 DIAGNOSIS — F02.80 LATE ONSET ALZHEIMER'S DISEASE WITHOUT BEHAVIORAL DISTURBANCE (HCC): ICD-10-CM

## 2019-05-22 DIAGNOSIS — M25.561 CHRONIC PAIN OF BOTH KNEES: ICD-10-CM

## 2019-05-22 DIAGNOSIS — M79.7 FIBROMYALGIA: Primary | ICD-10-CM

## 2019-05-22 DIAGNOSIS — E03.8 OTHER SPECIFIED HYPOTHYROIDISM: ICD-10-CM

## 2019-05-22 DIAGNOSIS — G30.1 LATE ONSET ALZHEIMER'S DISEASE WITHOUT BEHAVIORAL DISTURBANCE (HCC): ICD-10-CM

## 2019-05-22 DIAGNOSIS — M25.562 CHRONIC PAIN OF BOTH KNEES: ICD-10-CM

## 2019-05-22 DIAGNOSIS — G89.29 CHRONIC PAIN OF BOTH KNEES: ICD-10-CM

## 2019-05-22 LAB
ALBUMIN SERPL-MCNC: 3.7 G/DL (ref 3.5–5.1)
ALP BLD-CCNC: 82 U/L (ref 38–126)
ALT SERPL-CCNC: 8 U/L (ref 11–66)
ANION GAP SERPL CALCULATED.3IONS-SCNC: 10 MEQ/L (ref 8–16)
AST SERPL-CCNC: 16 U/L (ref 5–40)
BASOPHILS # BLD: 0.6 %
BASOPHILS ABSOLUTE: 0 THOU/MM3 (ref 0–0.1)
BILIRUB SERPL-MCNC: 0.4 MG/DL (ref 0.3–1.2)
BUN BLDV-MCNC: 12 MG/DL (ref 7–22)
CALCIUM SERPL-MCNC: 8.6 MG/DL (ref 8.5–10.5)
CHLORIDE BLD-SCNC: 102 MEQ/L (ref 98–111)
CO2: 26 MEQ/L (ref 23–33)
CREAT SERPL-MCNC: 0.7 MG/DL (ref 0.4–1.2)
EOSINOPHIL # BLD: 1.1 %
EOSINOPHILS ABSOLUTE: 0.1 THOU/MM3 (ref 0–0.4)
ERYTHROCYTE [DISTWIDTH] IN BLOOD BY AUTOMATED COUNT: 14.7 % (ref 11.5–14.5)
ERYTHROCYTE [DISTWIDTH] IN BLOOD BY AUTOMATED COUNT: 61.6 FL (ref 35–45)
GFR SERPL CREATININE-BSD FRML MDRD: 81 ML/MIN/1.73M2
GLUCOSE BLD-MCNC: 107 MG/DL (ref 70–108)
HCT VFR BLD CALC: 39.3 % (ref 37–47)
HEMOGLOBIN: 12.7 GM/DL (ref 12–16)
IMMATURE GRANS (ABS): 0.03 THOU/MM3 (ref 0–0.07)
IMMATURE GRANULOCYTES: 0.5 %
LYMPHOCYTES # BLD: 27.5 %
LYMPHOCYTES ABSOLUTE: 1.8 THOU/MM3 (ref 1–4.8)
MACROCYTES: PRESENT
MCH RBC QN AUTO: 36.3 PG (ref 26–33)
MCHC RBC AUTO-ENTMCNC: 32.3 GM/DL (ref 32.2–35.5)
MCV RBC AUTO: 112.3 FL (ref 81–99)
MONOCYTES # BLD: 6 %
MONOCYTES ABSOLUTE: 0.4 THOU/MM3 (ref 0.4–1.3)
NUCLEATED RED BLOOD CELLS: 0 /100 WBC
PLATELET # BLD: 317 THOU/MM3 (ref 130–400)
PMV BLD AUTO: 9.8 FL (ref 9.4–12.4)
POTASSIUM SERPL-SCNC: 4.7 MEQ/L (ref 3.5–5.2)
RBC # BLD: 3.5 MILL/MM3 (ref 4.2–5.4)
SCAN OF BLOOD SMEAR: NORMAL
SEG NEUTROPHILS: 64.3 %
SEGMENTED NEUTROPHILS ABSOLUTE COUNT: 4.2 THOU/MM3 (ref 1.8–7.7)
SODIUM BLD-SCNC: 138 MEQ/L (ref 135–145)
T4 FREE: 1.07 NG/DL (ref 0.93–1.76)
TOTAL PROTEIN: 6.5 G/DL (ref 6.1–8)
TSH SERPL DL<=0.05 MIU/L-ACNC: 1.48 UIU/ML (ref 0.4–4.2)
WBC # BLD: 6.5 THOU/MM3 (ref 4.8–10.8)

## 2019-05-22 PROCEDURE — 4040F PNEUMOC VAC/ADMIN/RCVD: CPT | Performed by: FAMILY MEDICINE

## 2019-05-22 PROCEDURE — G8399 PT W/DXA RESULTS DOCUMENT: HCPCS | Performed by: FAMILY MEDICINE

## 2019-05-22 PROCEDURE — 1123F ACP DISCUSS/DSCN MKR DOCD: CPT | Performed by: FAMILY MEDICINE

## 2019-05-22 PROCEDURE — G8419 CALC BMI OUT NRM PARAM NOF/U: HCPCS | Performed by: FAMILY MEDICINE

## 2019-05-22 PROCEDURE — 1090F PRES/ABSN URINE INCON ASSESS: CPT | Performed by: FAMILY MEDICINE

## 2019-05-22 PROCEDURE — G8427 DOCREV CUR MEDS BY ELIG CLIN: HCPCS | Performed by: FAMILY MEDICINE

## 2019-05-22 PROCEDURE — 99214 OFFICE O/P EST MOD 30 MIN: CPT | Performed by: FAMILY MEDICINE

## 2019-05-22 PROCEDURE — 1036F TOBACCO NON-USER: CPT | Performed by: FAMILY MEDICINE

## 2019-05-22 PROCEDURE — G8598 ASA/ANTIPLAT THER USED: HCPCS | Performed by: FAMILY MEDICINE

## 2019-05-22 RX ORDER — ACETAMINOPHEN 500 MG
TABLET ORAL
Qty: 180 TABLET | Refills: 5 | Status: SHIPPED | OUTPATIENT
Start: 2019-05-22 | End: 2020-07-07 | Stop reason: DRUGHIGH

## 2019-05-22 NOTE — PROGRESS NOTES
Aditi Marino is a 70 y.o. female that presents for     No chief complaint on file. There were no vitals taken for this visit. HPI:        Health Maintenance   Topic Date Due    Shingles Vaccine (1 of 2) 05/30/1992    TSH testing  12/03/2018    Flu vaccine (Season Ended) 09/01/2019    DTaP/Tdap/Td vaccine (2 - Td) 08/06/2026    DEXA (modify frequency per FRAX score)  Completed    Pneumococcal 65+ years Vaccine  Completed       Past Medical History:   Diagnosis Date    Anxiety     Arthritis     Bleeding disorder ([address]     CAD (coronary artery disease)     Stent in 1st diagonal in 2007    Cataract     Celiac disease     Dementia     Depression     Fibromyalgia     Hearing impairment     HTN (hypertension)     Hyperlipemia     MI (myocardial infarction) [facility] [address] 2003    Osteoporosis     Pernicious anemia     Pneumonia     Thyroid disease     Urinary incontinence     Vitamin B12 deficiency     Vitamin K deficiency        Past Surgical History:   Procedure Laterality Date    APPENDECTOMY  1963    BLADDER REPAIR      x2    CHOLECYSTECTOMY  1963    COLONOSCOPY  2004, 2014, 2015    CORONARY ANGIOPLASTY WITH STENT PLACEMENT  5/2007    HYSTERECTOMY  1964    KNEE ARTHROSCOPY Left 07/19/2016    Dr Iain Regan HISTORY  5-16-14    Colonoscopy  [address] [facility]    TONSILLECTOMY  as a child     TOTAL KNEE ARTHROPLASTY      UPPER GASTROINTESTINAL ENDOSCOPY  2004       Social History     Tobacco Use    Smoking status: Never Smoker    Smokeless tobacco: Never Used   Substance Use Topics    Alcohol use: No    Drug use: No       Family History   Problem Relation Age of Onset    Cancer Mother         liver, lung    Asthma Father          I have reviewed the patient's past medical history, past surgical history, allergies, medications, social and family history and I have made updates where appropriate.     Review of Systems        PHYSICAL EXAM:  There were no vitals taken for this visit. General Appearance: well developed and well- nourished, in no acute distress  Head: normocephalic and atraumatic  ENT: external ear and ear canal normal bilaterally, nose without deformity, nasal mucosa and turbinates normal without polyps, oropharynx normal, dentition is normal for age, no lipor gum lesions noted  Neck: supple and non-tender without mass, no thyromegaly or thyroid nodules, no cervical lymphadenopathy  Pulmonary/Chest: clear to auscultation bilaterally- no wheezes, rales or rhonchi, normal air movement, no respiratory distress or retractions  Cardiovascular: normal rate, regular rhythm, normal S1 and S2, no murmurs, rubs, clicks, or gallops, distal pulses intact  Abdomen: soft, non-tender, non-distended, no rebound or guarding, no masses or hernias noted, no hepatospleenomegaly  Extremities: no cyanosis, clubbing or edema of the lower extremities  Psych:  Normal affect without evidence of depression oranxiety, insight and judgement are appropriate, memory appears intact  Skin: warm and dry, no rash or erythema      ASSESSMENT & PLAN  There are no diagnoses linked to this encounter. No follow-ups on file.     Controlled Substances Monitoring:                     {Provider YAEX:[id number]}

## 2019-05-22 NOTE — PROGRESS NOTES
 HTN (hypertension)     Hyperlipemia     MI (myocardial infarction) ([facility] [address] 2003    Osteoporosis     Pernicious anemia     Pneumonia     Thyroid disease     Urinary incontinence     Vitamin B12 deficiency     Vitamin K deficiency        Past Surgical History:   Procedure Laterality Date    APPENDECTOMY  1963   Brianna Goodie BLADDER REPAIR      x2    CHOLECYSTECTOMY  1963    COLONOSCOPY  2004, 2014, 2015    CORONARY ANGIOPLASTY WITH STENT PLACEMENT  5/2007    HYSTERECTOMY  1964    KNEE ARTHROSCOPY Left 07/19/2016    Dr Ольга Worthy HISTORY  5-16-14    Colonoscopy  [address] [facility]    TONSILLECTOMY  as a child     TOTAL KNEE ARTHROPLASTY      UPPER GASTROINTESTINAL ENDOSCOPY  2004       Social History     Tobacco Use    Smoking status: Never Smoker    Smokeless tobacco: Never Used   Substance Use Topics    Alcohol use: No    Drug use: No       Family History   Problem Relation Age of Onset    Cancer Mother         liver, lung    Asthma Father          I have reviewed the patient's past medical history, past surgical history, allergies, medications, social and family history and I have made updates where appropriate.     Review of Systems        PHYSICAL EXAM:  /62   Pulse 68   Temp 97.6 °F (36.4 °C) (Oral)   Resp 12   Ht 4' 9.5\" (1.461 m)   Wt 139 lb (63 kg)   BMI 29.56 kg/m²     General Appearance: well developed and well- nourished, in no acute distress  Head: normocephalic and atraumatic  ENT: external ear and ear canal normal bilaterally, nose without deformity, nasal mucosa and turbinates normal without polyps, oropharynx normal, dentition is normal for age, no lipor gum lesions noted  Neck: supple and non-tender without mass, no thyromegaly or thyroid nodules, no cervical lymphadenopathy  Pulmonary/Chest: clear to auscultation bilaterally- no wheezes, rales or rhonchi, normal air movement, no respiratory distress or retractions  Cardiovascular: normal rate, regular rhythm, normal S1 and S2, no murmurs, rubs, clicks, or gallops, distal pulses intact  Abdomen: soft, non-tender, non-distended, no rebound or guarding, no masses or hernias noted, no hepatospleenomegaly  Extremities: no cyanosis, clubbing or edema of the lower extremities  Psych:  Normal affect without evidence of depression oranxiety, insight and judgement are appropriate, memory appears intact  Skin: warm and dry, no rash or erythema      ASSESSMENT & PLAN  Dandy Gruber was seen today for other and leg pain. Diagnoses and all orders for this visit:    Fibromyalgia  -     acetaminophen (TYLENOL) 500 MG tablet; 1-2 tabs PO q 8 hrs PRN    Late onset Alzheimer's disease without behavioral disturbance  -     Comprehensive Metabolic Panel; Future  -     CBC Auto Differential; Future    Recurrent major depressive disorder in partial remission ([facility] [address]  -     Comprehensive Metabolic Panel; Future  -     CBC Auto Differential; Future    Other specified hypothyroidism  -     TSH without Reflex; Future  -     T4, Free; Future  -     Comprehensive Metabolic Panel; Future  -     CBC Auto Differential; Future    Essential hypertension  -     Comprehensive Metabolic Panel; Future    Chronic pain of both knees    -Thigh pain appears referred from the knees, will start with Tylenol ES, call if sx persist  -Other chronic issues are stable, continue current medications  -Advised to call if any issues      Return in about 6 months (around 11/22/2019), or if symptoms worsen or fail to improve. Controlled Substances Monitoring:                     Dandy Gruber received counseling on the following healthy behaviors: medication adherence  Reviewed prior labs and health maintenance. Continue current medications, diet and exercise. Discussed use, benefit, and side effects of prescribed medications. Barriers to medication compliance addressed. Patient given educational materials - see patient instructions. All patient questions answered. Patient voiced understanding.

## 2019-05-22 NOTE — PROGRESS NOTES
Have you changed or started any medications since your last visit including any over-the-counter medicines, vitamins, or herbal medicines? no   Are you having any side effects from any of your medications? -  no  Have you stopped taking any of your medications? Is so, why? -  no    Have you seen any other physician or provider since your last visit? Yes - Records Obtained  Have you had any other diagnostic tests since your last visit? No  Have you been seen in the emergency room and/or had an admission to a hospital since we last saw you? No  Have you had your routine dental cleaning in the past 6 months? no    Have you activated your [employer] account? If not, what are your barriers? No:      Patient Care Team:  Trinidad Hodgkin, DO as PCP - General (Family Medicine)    Medical History Review  Past Medical, Family, and Social History     Defer to provider.

## 2019-05-23 ENCOUNTER — TELEPHONE (OUTPATIENT)
Dept: FAMILY MEDICINE CLINIC | Age: 79
End: 2019-05-23

## 2019-05-23 NOTE — TELEPHONE ENCOUNTER
----- Message from Hawa Best DO sent at 5/23/2019  8:29 AM EDT -----  Champ Snyder are stable from last check, recommend she continue her current medications.

## 2019-10-21 ENCOUNTER — APPOINTMENT (OUTPATIENT)
Dept: CT IMAGING | Age: 79
End: 2019-10-21
Payer: MEDICARE

## 2019-10-21 ENCOUNTER — HOSPITAL ENCOUNTER (EMERGENCY)
Age: 79
Discharge: HOME | End: 2019-10-21
Payer: MEDICARE

## 2019-10-21 VITALS
SYSTOLIC BLOOD PRESSURE: 168 MMHG | HEART RATE: 58 BPM | RESPIRATION RATE: 17 BRPM | BODY MASS INDEX: 23.73 KG/M2 | TEMPERATURE: 97.8 F | HEIGHT: 57 IN | DIASTOLIC BLOOD PRESSURE: 67 MMHG | WEIGHT: 110 LBS | OXYGEN SATURATION: 95 %

## 2019-10-21 DIAGNOSIS — R19.7 DIARRHEA, UNSPECIFIED TYPE: ICD-10-CM

## 2019-10-21 DIAGNOSIS — R10.11 ABDOMINAL PAIN, RIGHT UPPER QUADRANT: Primary | ICD-10-CM

## 2019-10-21 LAB
ALBUMIN SERPL-MCNC: 3.6 G/DL (ref 3.5–5.1)
ALP BLD-CCNC: 77 U/L (ref 38–126)
ALT SERPL-CCNC: 13 U/L (ref 11–66)
ANION GAP SERPL CALCULATED.3IONS-SCNC: 12 MEQ/L (ref 8–16)
AST SERPL-CCNC: 19 U/L (ref 5–40)
BACTERIA: NORMAL
BASOPHILS # BLD: 0.3 %
BASOPHILS ABSOLUTE: 0 THOU/MM3 (ref 0–0.1)
BILIRUB SERPL-MCNC: 0.5 MG/DL (ref 0.3–1.2)
BILIRUBIN URINE: NEGATIVE
BLOOD, URINE: NEGATIVE
BUN BLDV-MCNC: 10 MG/DL (ref 7–22)
CALCIUM SERPL-MCNC: 9 MG/DL (ref 8.5–10.5)
CASTS: NORMAL /LPF
CASTS: NORMAL /LPF
CHARACTER, URINE: CLEAR
CHLORIDE BLD-SCNC: 102 MEQ/L (ref 98–111)
CO2: 24 MEQ/L (ref 23–33)
COLOR: YELLOW
CREAT SERPL-MCNC: 0.7 MG/DL (ref 0.4–1.2)
CRYSTALS: NORMAL
DIFFERENTIAL TYPE: ABNORMAL
EKG ATRIAL RATE: 53 BPM
EKG P AXIS: 44 DEGREES
EKG P-R INTERVAL: 148 MS
EKG Q-T INTERVAL: 458 MS
EKG QRS DURATION: 80 MS
EKG QTC CALCULATION (BAZETT): 429 MS
EKG R AXIS: -19 DEGREES
EKG T AXIS: 20 DEGREES
EKG VENTRICULAR RATE: 53 BPM
EOSINOPHIL # BLD: 0.8 %
EOSINOPHILS ABSOLUTE: 0.1 THOU/MM3 (ref 0–0.4)
EPITHELIAL CELLS, UA: NORMAL /HPF
ERYTHROCYTE [DISTWIDTH] IN BLOOD BY AUTOMATED COUNT: 12.3 % (ref 11.5–14.5)
ERYTHROCYTE [DISTWIDTH] IN BLOOD BY AUTOMATED COUNT: 52.2 FL (ref 35–45)
GFR SERPL CREATININE-BSD FRML MDRD: 81 ML/MIN/1.73M2
GLUCOSE BLD-MCNC: 98 MG/DL (ref 70–108)
GLUCOSE, URINE: NEGATIVE MG/DL
HCT VFR BLD CALC: 40.2 % (ref 37–47)
HEMOCCULT STL QL: NEGATIVE
HEMOGLOBIN: 13.4 GM/DL (ref 12–16)
IMMATURE GRANS (ABS): 0.02 THOU/MM3 (ref 0–0.07)
IMMATURE GRANULOCYTES: 0.3 %
KETONES, URINE: NEGATIVE
LEUKOCYTE ESTERASE, URINE: NEGATIVE
LYMPHOCYTES # BLD: 23 %
LYMPHOCYTES ABSOLUTE: 1.4 THOU/MM3 (ref 1–4.8)
MACROCYTES: PRESENT
MCH RBC QN AUTO: 37.7 PG (ref 26–33)
MCHC RBC AUTO-ENTMCNC: 33.3 GM/DL (ref 32.2–35.5)
MCV RBC AUTO: 113.2 FL (ref 81–99)
MISCELLANEOUS LAB TEST RESULT: NORMAL
MONOCYTES # BLD: 6.1 %
MONOCYTES ABSOLUTE: 0.4 THOU/MM3 (ref 0.4–1.3)
NITRITE, URINE: NEGATIVE
NUCLEATED RED BLOOD CELLS: 0 /100 WBC
OSMOLALITY CALCULATION: 274.7 MOSMOL/KG (ref 275–300)
PATHOLOGIST REVIEW: ABNORMAL
PH UA: 5.5 (ref 5–9)
PLATELET # BLD: 297 THOU/MM3 (ref 130–400)
PMV BLD AUTO: 9.8 FL (ref 9.4–12.4)
POTASSIUM SERPL-SCNC: 3.7 MEQ/L (ref 3.5–5.2)
PROTEIN UA: NEGATIVE MG/DL
RBC # BLD: 3.55 MILL/MM3 (ref 4.2–5.4)
RBC URINE: NORMAL /HPF
RENAL EPITHELIAL, UA: NORMAL
SCAN OF BLOOD SMEAR: NORMAL
SEG NEUTROPHILS: 69.5 %
SEGMENTED NEUTROPHILS ABSOLUTE COUNT: 4.4 THOU/MM3 (ref 1.8–7.7)
SODIUM BLD-SCNC: 138 MEQ/L (ref 135–145)
SPECIFIC GRAVITY UA: 1.02 (ref 1–1.03)
TOTAL PROTEIN: 6.7 G/DL (ref 6.1–8)
UROBILINOGEN, URINE: 0.2 EU/DL (ref 0–1)
WBC # BLD: 6.3 THOU/MM3 (ref 4.8–10.8)
WBC UA: NORMAL /HPF
YEAST: NORMAL

## 2019-10-21 PROCEDURE — 82272 OCCULT BLD FECES 1-3 TESTS: CPT

## 2019-10-21 PROCEDURE — 85025 COMPLETE CBC W/AUTO DIFF WBC: CPT

## 2019-10-21 PROCEDURE — 6370000000 HC RX 637 (ALT 250 FOR IP): Performed by: STUDENT IN AN ORGANIZED HEALTH CARE EDUCATION/TRAINING PROGRAM

## 2019-10-21 PROCEDURE — 36415 COLL VENOUS BLD VENIPUNCTURE: CPT

## 2019-10-21 PROCEDURE — 99284 EMERGENCY DEPT VISIT MOD MDM: CPT

## 2019-10-21 PROCEDURE — 93005 ELECTROCARDIOGRAM TRACING: CPT | Performed by: STUDENT IN AN ORGANIZED HEALTH CARE EDUCATION/TRAINING PROGRAM

## 2019-10-21 PROCEDURE — 81001 URINALYSIS AUTO W/SCOPE: CPT

## 2019-10-21 PROCEDURE — 80053 COMPREHEN METABOLIC PANEL: CPT

## 2019-10-21 PROCEDURE — 74176 CT ABD & PELVIS W/O CONTRAST: CPT

## 2019-10-21 RX ORDER — ACETAMINOPHEN 325 MG/1
650 TABLET ORAL ONCE
Status: COMPLETED | OUTPATIENT
Start: 2019-10-21 | End: 2019-10-21

## 2019-10-21 RX ORDER — DICYCLOMINE HYDROCHLORIDE 10 MG/1
10 CAPSULE ORAL EVERY 6 HOURS PRN
Qty: 20 CAPSULE | Refills: 0 | Status: SHIPPED | OUTPATIENT
Start: 2019-10-21 | End: 2020-07-06

## 2019-10-21 RX ADMIN — ACETAMINOPHEN 650 MG: 325 TABLET ORAL at 13:23

## 2019-10-21 ASSESSMENT — PAIN SCALES - GENERAL
PAINLEVEL_OUTOF10: 5
PAINLEVEL_OUTOF10: 5
PAINLEVEL_OUTOF10: 6

## 2019-10-21 ASSESSMENT — ENCOUNTER SYMPTOMS
BACK PAIN: 1
SHORTNESS OF BREATH: 0
ABDOMINAL PAIN: 1
COUGH: 0
VOMITING: 0
EYE PAIN: 0
EYE DISCHARGE: 0
BLOOD IN STOOL: 1
WHEEZING: 0
NAUSEA: 0
SORE THROAT: 0
DIARRHEA: 1
RHINORRHEA: 0

## 2019-10-21 ASSESSMENT — PAIN DESCRIPTION - ORIENTATION: ORIENTATION: RIGHT;UPPER

## 2019-10-21 ASSESSMENT — PAIN DESCRIPTION - PAIN TYPE: TYPE: ACUTE PAIN

## 2019-10-21 ASSESSMENT — PAIN DESCRIPTION - LOCATION: LOCATION: ABDOMEN

## 2019-10-22 ENCOUNTER — HOSPITAL ENCOUNTER (OUTPATIENT)
Age: 79
Discharge: HOME | End: 2019-10-22
Payer: MEDICARE

## 2019-10-22 ENCOUNTER — HOSPITAL ENCOUNTER (OUTPATIENT)
Dept: GENERAL RADIOLOGY | Age: 79
Discharge: HOME | End: 2019-10-22
Payer: MEDICARE

## 2019-10-22 ENCOUNTER — OFFICE VISIT (OUTPATIENT)
Dept: FAMILY MEDICINE CLINIC | Age: 79
End: 2019-10-22
Payer: MEDICARE

## 2019-10-22 VITALS
BODY MASS INDEX: 29.21 KG/M2 | WEIGHT: 135 LBS | TEMPERATURE: 98.3 F | DIASTOLIC BLOOD PRESSURE: 60 MMHG | RESPIRATION RATE: 16 BRPM | HEART RATE: 60 BPM | SYSTOLIC BLOOD PRESSURE: 124 MMHG

## 2019-10-22 DIAGNOSIS — N39.41 URGE INCONTINENCE: ICD-10-CM

## 2019-10-22 DIAGNOSIS — R07.81 RIB PAIN ON RIGHT SIDE: ICD-10-CM

## 2019-10-22 DIAGNOSIS — R07.81 RIB PAIN ON RIGHT SIDE: Primary | ICD-10-CM

## 2019-10-22 PROCEDURE — 1090F PRES/ABSN URINE INCON ASSESS: CPT | Performed by: FAMILY MEDICINE

## 2019-10-22 PROCEDURE — G8399 PT W/DXA RESULTS DOCUMENT: HCPCS | Performed by: FAMILY MEDICINE

## 2019-10-22 PROCEDURE — 4040F PNEUMOC VAC/ADMIN/RCVD: CPT | Performed by: FAMILY MEDICINE

## 2019-10-22 PROCEDURE — 1036F TOBACCO NON-USER: CPT | Performed by: FAMILY MEDICINE

## 2019-10-22 PROCEDURE — 0509F URINE INCON PLAN DOCD: CPT | Performed by: FAMILY MEDICINE

## 2019-10-22 PROCEDURE — 99213 OFFICE O/P EST LOW 20 MIN: CPT | Performed by: FAMILY MEDICINE

## 2019-10-22 PROCEDURE — 71101 X-RAY EXAM UNILAT RIBS/CHEST: CPT

## 2019-10-22 PROCEDURE — G8484 FLU IMMUNIZE NO ADMIN: HCPCS | Performed by: FAMILY MEDICINE

## 2019-10-22 PROCEDURE — G8417 CALC BMI ABV UP PARAM F/U: HCPCS | Performed by: FAMILY MEDICINE

## 2019-10-22 PROCEDURE — G8427 DOCREV CUR MEDS BY ELIG CLIN: HCPCS | Performed by: FAMILY MEDICINE

## 2019-10-22 PROCEDURE — 1123F ACP DISCUSS/DSCN MKR DOCD: CPT | Performed by: FAMILY MEDICINE

## 2019-10-22 PROCEDURE — G8598 ASA/ANTIPLAT THER USED: HCPCS | Performed by: FAMILY MEDICINE

## 2019-10-22 RX ORDER — TRAMADOL HYDROCHLORIDE 50 MG/1
50 TABLET ORAL EVERY 6 HOURS PRN
Qty: 20 TABLET | Refills: 0 | Status: SHIPPED | OUTPATIENT
Start: 2019-10-22 | End: 2019-10-27

## 2019-10-22 RX ORDER — OXYBUTYNIN CHLORIDE 10 MG/1
10 TABLET, EXTENDED RELEASE ORAL DAILY
Qty: 90 TABLET | Refills: 3 | Status: ON HOLD | OUTPATIENT
Start: 2019-10-22 | End: 2020-07-15 | Stop reason: HOSPADM

## 2019-10-23 ENCOUNTER — TELEPHONE (OUTPATIENT)
Dept: FAMILY MEDICINE CLINIC | Age: 79
End: 2019-10-23

## 2019-12-06 RX ORDER — POTASSIUM CHLORIDE 20 MEQ/1
TABLET, EXTENDED RELEASE ORAL
Qty: 90 TABLET | Refills: 4 | Status: SHIPPED | OUTPATIENT
Start: 2019-12-06 | End: 2020-12-07

## 2019-12-30 RX ORDER — ATORVASTATIN CALCIUM 40 MG/1
TABLET, FILM COATED ORAL
Qty: 90 TABLET | Refills: 4 | Status: SHIPPED | OUTPATIENT
Start: 2019-12-30

## 2020-03-12 RX ORDER — RALOXIFENE HYDROCHLORIDE 60 MG/1
TABLET, FILM COATED ORAL
Qty: 90 TABLET | Refills: 3 | Status: SHIPPED | OUTPATIENT
Start: 2020-03-12

## 2020-04-08 ENCOUNTER — TELEPHONE (OUTPATIENT)
Dept: FAMILY MEDICINE CLINIC | Age: 80
End: 2020-04-08

## 2020-04-08 NOTE — TELEPHONE ENCOUNTER
[facility] fax received. Resident has been running a low grade temp . She has no order for PRN Tylenol. Can they get an order? Fax scanned into Media for review.

## 2020-05-28 ENCOUNTER — HOSPITAL ENCOUNTER (EMERGENCY)
Age: 80
Discharge: PSYCHIATRIC OR SUBSTANCE USE FACILITY | End: 2020-05-29
Payer: MEDICARE

## 2020-05-28 LAB
ACETAMINOPHEN LEVEL: < 5 UG/ML (ref 0–20)
ALBUMIN SERPL-MCNC: 3.1 G/DL (ref 3.5–5.1)
ALP BLD-CCNC: 69 U/L (ref 38–126)
ALT SERPL-CCNC: 8 U/L (ref 11–66)
AMPHETAMINE+METHAMPHETAMINE URINE SCREEN: NEGATIVE
ANION GAP SERPL CALCULATED.3IONS-SCNC: 14 MEQ/L (ref 8–16)
AST SERPL-CCNC: 16 U/L (ref 5–40)
BACTERIA: ABNORMAL /HPF
BARBITURATE QUANTITATIVE URINE: NEGATIVE
BASOPHILS # BLD: 0.5 %
BASOPHILS ABSOLUTE: 0 THOU/MM3 (ref 0–0.1)
BENZODIAZEPINE QUANTITATIVE URINE: NEGATIVE
BILIRUB SERPL-MCNC: 0.3 MG/DL (ref 0.3–1.2)
BILIRUBIN DIRECT: < 0.2 MG/DL (ref 0–0.3)
BILIRUBIN URINE: NEGATIVE
BLOOD, URINE: NEGATIVE
BUN BLDV-MCNC: 12 MG/DL (ref 7–22)
CALCIUM SERPL-MCNC: 8.7 MG/DL (ref 8.5–10.5)
CANNABINOID QUANTITATIVE URINE: NEGATIVE
CASTS 2: ABNORMAL /LPF
CASTS UA: ABNORMAL /LPF
CHARACTER, URINE: CLEAR
CHLORIDE BLD-SCNC: 105 MEQ/L (ref 98–111)
CO2: 19 MEQ/L (ref 23–33)
COCAINE METABOLITE QUANTITATIVE URINE: NEGATIVE
COLOR: YELLOW
CREAT SERPL-MCNC: 1 MG/DL (ref 0.4–1.2)
CRYSTALS, UA: ABNORMAL
EOSINOPHIL # BLD: 0.8 %
EOSINOPHILS ABSOLUTE: 0.1 THOU/MM3 (ref 0–0.4)
EPITHELIAL CELLS, UA: ABNORMAL /HPF
ERYTHROCYTE [DISTWIDTH] IN BLOOD BY AUTOMATED COUNT: 13.8 % (ref 11.5–14.5)
ERYTHROCYTE [DISTWIDTH] IN BLOOD BY AUTOMATED COUNT: 54.1 FL (ref 35–45)
ETHYL ALCOHOL, SERUM: < 0.01 %
GFR SERPL CREATININE-BSD FRML MDRD: 54 ML/MIN/1.73M2
GLUCOSE BLD-MCNC: 99 MG/DL (ref 70–108)
GLUCOSE URINE: NEGATIVE MG/DL
HCT VFR BLD CALC: 40.3 % (ref 37–47)
HEMOGLOBIN: 12.7 GM/DL (ref 12–16)
IMMATURE GRANS (ABS): 0.04 THOU/MM3 (ref 0–0.07)
IMMATURE GRANULOCYTES: 0.5 %
KETONES, URINE: NEGATIVE
LEUKOCYTE ESTERASE, URINE: ABNORMAL
LYMPHOCYTES # BLD: 27.2 %
LYMPHOCYTES ABSOLUTE: 2.3 THOU/MM3 (ref 1–4.8)
MCH RBC QN AUTO: 33.6 PG (ref 26–33)
MCHC RBC AUTO-ENTMCNC: 31.5 GM/DL (ref 32.2–35.5)
MCV RBC AUTO: 106.6 FL (ref 81–99)
MISCELLANEOUS 2: ABNORMAL
MONOCYTES # BLD: 7.9 %
MONOCYTES ABSOLUTE: 0.7 THOU/MM3 (ref 0.4–1.3)
NITRITE, URINE: NEGATIVE
NUCLEATED RED BLOOD CELLS: 0 /100 WBC
OPIATES, URINE: NEGATIVE
OSMOLALITY CALCULATION: 275.5 MOSMOL/KG (ref 275–300)
OXYCODONE: NEGATIVE
PH UA: 5 (ref 5–9)
PHENCYCLIDINE QUANTITATIVE URINE: NEGATIVE
PLATELET # BLD: 236 THOU/MM3 (ref 130–400)
PMV BLD AUTO: 10.4 FL (ref 9.4–12.4)
POTASSIUM SERPL-SCNC: 3.7 MEQ/L (ref 3.5–5.2)
PROTEIN UA: NEGATIVE
RBC # BLD: 3.78 MILL/MM3 (ref 4.2–5.4)
RBC URINE: ABNORMAL /HPF
RENAL EPITHELIAL, UA: ABNORMAL
SALICYLATE, SERUM: < 0.3 MG/DL (ref 2–10)
SEG NEUTROPHILS: 63.1 %
SEGMENTED NEUTROPHILS ABSOLUTE COUNT: 5.4 THOU/MM3 (ref 1.8–7.7)
SODIUM BLD-SCNC: 138 MEQ/L (ref 135–145)
SPECIFIC GRAVITY, URINE: 1.01 (ref 1–1.03)
TOTAL PROTEIN: 6.2 G/DL (ref 6.1–8)
TSH SERPL DL<=0.05 MIU/L-ACNC: 2.38 UIU/ML (ref 0.4–4.2)
UROBILINOGEN, URINE: 0.2 EU/DL (ref 0–1)
WBC # BLD: 8.5 THOU/MM3 (ref 4.8–10.8)
WBC UA: ABNORMAL /HPF
YEAST: ABNORMAL

## 2020-05-28 PROCEDURE — 36415 COLL VENOUS BLD VENIPUNCTURE: CPT

## 2020-05-28 PROCEDURE — 82248 BILIRUBIN DIRECT: CPT

## 2020-05-28 PROCEDURE — G0480 DRUG TEST DEF 1-7 CLASSES: HCPCS

## 2020-05-28 PROCEDURE — 84484 ASSAY OF TROPONIN QUANT: CPT

## 2020-05-28 PROCEDURE — 96374 THER/PROPH/DIAG INJ IV PUSH: CPT

## 2020-05-28 PROCEDURE — 80307 DRUG TEST PRSMV CHEM ANLYZR: CPT

## 2020-05-28 PROCEDURE — 80053 COMPREHEN METABOLIC PANEL: CPT

## 2020-05-28 PROCEDURE — 81001 URINALYSIS AUTO W/SCOPE: CPT

## 2020-05-28 PROCEDURE — 84443 ASSAY THYROID STIM HORMONE: CPT

## 2020-05-28 PROCEDURE — 87086 URINE CULTURE/COLONY COUNT: CPT

## 2020-05-28 PROCEDURE — 99285 EMERGENCY DEPT VISIT HI MDM: CPT

## 2020-05-28 PROCEDURE — 85025 COMPLETE CBC W/AUTO DIFF WBC: CPT

## 2020-05-29 VITALS
OXYGEN SATURATION: 94 % | DIASTOLIC BLOOD PRESSURE: 70 MMHG | RESPIRATION RATE: 18 BRPM | HEART RATE: 50 BPM | WEIGHT: 145 LBS | BODY MASS INDEX: 31.38 KG/M2 | TEMPERATURE: 97.6 F | SYSTOLIC BLOOD PRESSURE: 137 MMHG

## 2020-05-29 LAB
EKG ATRIAL RATE: 50 BPM
EKG P AXIS: 63 DEGREES
EKG P-R INTERVAL: 168 MS
EKG Q-T INTERVAL: 508 MS
EKG QRS DURATION: 84 MS
EKG QTC CALCULATION (BAZETT): 463 MS
EKG R AXIS: 1 DEGREES
EKG T AXIS: 5 DEGREES
EKG VENTRICULAR RATE: 50 BPM
ORGANISM: ABNORMAL
SARS-COV-2, NAAT: NOT DETECTED
TROPONIN T: < 0.01 NG/ML
TROPONIN T: < 0.01 NG/ML
URINE CULTURE REFLEX: ABNORMAL

## 2020-05-29 PROCEDURE — U0002 COVID-19 LAB TEST NON-CDC: HCPCS

## 2020-05-29 PROCEDURE — 6370000000 HC RX 637 (ALT 250 FOR IP): Performed by: PHYSICIAN ASSISTANT

## 2020-05-29 PROCEDURE — 84484 ASSAY OF TROPONIN QUANT: CPT

## 2020-05-29 PROCEDURE — 36415 COLL VENOUS BLD VENIPUNCTURE: CPT

## 2020-05-29 PROCEDURE — 2500000003 HC RX 250 WO HCPCS: Performed by: PHYSICIAN ASSISTANT

## 2020-05-29 PROCEDURE — 93005 ELECTROCARDIOGRAM TRACING: CPT | Performed by: PHYSICIAN ASSISTANT

## 2020-05-29 RX ADMIN — LIDOCAINE HYDROCHLORIDE: 20 SOLUTION ORAL; TOPICAL at 06:20

## 2020-05-29 RX ADMIN — FAMOTIDINE 20 MG: 10 INJECTION INTRAVENOUS at 06:21

## 2020-05-29 NOTE — ED NOTES
ED nurse-to-nurse bedside report    Chief Complaint   Patient presents with    Suicide Attempt      LOC: alert and orientated to name and place  Vital signs   Vitals:    05/28/20 2151 05/29/20 0243   BP: (!) 176/93 (!) 158/63   Pulse: 58 (!) 48   Resp: 18 18   Temp: 97.6 °F (36.4 °C)    TempSrc: Oral    SpO2: 100% 100%   Weight: 145 lb (65.8 kg)       Pain:    Pain Interventions: N/A  Pain Goal: N/A  Oxygen: No    Current needs required Room Air   Telemetry: No  LDAs:    Continuous Infusions:   Mobility: Independent  [place] Fall Risk Score:    Fall Risk 12/6/2018 10/10/2017 10/17/2015 6/3/2014   2 or more falls in past year? no no no no   Fall with injury in past year? no no no no     Fall Interventions: Siderails  Report given to: CAYDEN Fleming RN  05/29/20 4260

## 2020-05-29 NOTE — ED PROVIDER NOTES
Addendum: Care assumed at 6 AM.  Patient was medically clear. Patient will be transferred to [facility].       PRICILLA Rhodes  05/29/20 5532

## 2020-05-29 NOTE — PROGRESS NOTES
- The patient is no longer able to speak for herself. Contact was made with the patient's daughter ([place]) and Marley (reyes Dang) to determine patients desires regarding ventilation and CPR. Daughter stated Bijan Salazar does not want this. What she has is not curable. Please do not do anything more than keeping her comfortable. \"   - This staff then asked if she would like to change her existing Code Status prior to being transferred to the ECF. Chelsey Fischer agreed \"this would be necessary so nothing is done that her mom didn't want. \"   - This staff obtained the patients nurse to confirm the code status change. ED physician confirmed the conversation and the patients code status now reflects a DNR-CC.   - Copies of the [facility] were provided to the family and a copy will be provided to the transporting agency to provide to the ECF at admission.      * See ACP Note
transfer given at this time.

## 2020-05-29 NOTE — ED NOTES
Patient resting on cot. No distress noted. Staff sitter remains at bedside.       Noemí Lucero RN  05/29/20 3017

## 2020-05-29 NOTE — ACP (ADVANCE CARE PLANNING)
Advance Care Planning     Advance Care Planning Activator (Inpatient)  Conversation Note      Date of ACP Conversation: 5/28/2020    Conversation Conducted with: Patient with [address]: Named in Advance Directive or Healthcare Power of  (name) Matti Favre    ACP Activator: Ed Olvera    \"Who would you like to name as your primary health care decision-maker? \"               Name: Welton Favre          Relationship: Daughter/POA            Phone number: [phone]  \"Can this person be reached easily? \" Yes  \"Who would you like to name as your back-up decision maker? \"   Name: Claudio Dodson          Relationship: DIL              Phone number: [phone]  \"Can this person be reached easily? \" Yes    Note: If the relationship of these Decision-Makers to the patient does NOT follow your state's Next of Kin hierarchy, recommend that patient complete ACP document that meets state-specific requirements to allow them to act on the patient's behalf when appropriate. Care Preferences    Ventilation: \"If you were in your present state of health and suddenly became very ill and were unable to breathe on your own, what would your preference be about the use of a ventilator (breathing machine) if it were available to you? \"      Would the patient desire the use of ventilator (breathing machine)?: no    \"If your health worsens and it becomes clear that your chance of recovery is unlikely, what would your preference be about the use of a ventilator (breathing machine) if it were available to you? \"     Would the patient desire the use of ventilator (breathing machine)?: No      Resuscitation  \"CPR works best to restart the heart when there is a sudden event, like a heart attack, in someone who is otherwise healthy. Unfortunately, CPR does not typically restart the heart for people who have serious health conditions or who are very sick. \"    \"In the event your heart stopped

## 2020-05-29 NOTE — ED PROVIDER NOTES
[facility] [address] COMPLAINT       Chief Complaint   Patient presents with    Suicide Attempt       Nurses Notes reviewed and I agree except as noted in the HPI. HISTORY OF PRESENT ILLNESS    Mckayla Gonsalez is a 70 y.o. female who presents to the Emergency Department for evaluation following a suicide attempt. Patient has a history of dementia and lives at an assisted living facility. Patient states that she became upset tonight because \"my daughter brought me stuff, threw it on the floor, and then left\". The patient's daughter denies this happening, as she stated that she has not been allowed to visit the patient for two months. The patient appears to have been hallucinating. Nursing staff state that they found the patient with a scarf tied tightly around her neck tonight, which led to them bringing her to the ED. The patient states that her \"argument\" with her daughter previously is what made her feel suicidal. The patient does not appear to have hung herself but seems to have tried to choke herself with a scarf. Currently, the patient denies chest pain, shortness of breath, neck pain or swelling, URI symptoms, fevers, chills, abdominal pain, nausea, vomiting, diarrhea, constipation, or difficulty breathing or swallowing swallowing. Patient has a past medical history of MI, anxiety, arthritis, CAD, cataracts, celiac disease, dementia, depression, fibromyalgia, HTN, HLD, osteoporosis, pernicious anemia, pneumonia, thyroid disease, urinary incontinence, vitamin B12 deficiency, and vitamin K deficiency. No further complaints at initial encounter. The HPI was provided by the patient. REVIEW OF SYSTEMS     Review of Systems   Constitutional: Negative for chills, fatigue and fever. HENT: Negative for congestion, ear pain, rhinorrhea, sore throat and trouble swallowing. Respiratory: Negative for cough and shortness of breath. Cardiovascular: Negative for chest pain. Gastrointestinal: Negative for abdominal pain, constipation, diarrhea, nausea and vomiting. Genitourinary: Negative for decreased urine volume, difficulty urinating and dysuria. Musculoskeletal: Negative for arthralgias, back pain, myalgias, neck pain and neck stiffness. Skin: Negative for color change, pallor and wound. Neurological: Negative for dizziness, weakness, light-headedness and headaches. Psychiatric/Behavioral: Positive for dysphoric mood and self-injury. Negative for agitation, confusion and suicidal ideas. The patient is not nervous/anxious. PAST MEDICAL HISTORY    has a past medical history of Anxiety, Arthritis, Bleeding disorder ([address] CAD (coronary artery disease), Cataract, Celiac disease, Dementia ([address] Depression, Fibromyalgia, Hearing impairment, HTN (hypertension), Hyperlipemia, MI (myocardial infarction) ([address] Osteoporosis, Pernicious anemia, Pneumonia, Thyroid disease, Urinary incontinence, Vitamin B12 deficiency, and Vitamin K deficiency. SURGICAL HISTORY      has a past surgical history that includes Hysterectomy (1964); Cholecystectomy (1963); Tonsillectomy (as a child ); Appendectomy (1963); Coronary angioplasty with stent (5/2007); bladder repair; other surgical history (5-16-14); Total knee arthroplasty; Upper gastrointestinal endoscopy (2004); Colonoscopy (2004, 2014, 2015); and Knee arthroscopy (Left, 07/19/2016).     CURRENT MEDICATIONS       Discharge Medication List as of 5/29/2020 11:02 AM      CONTINUE these medications which have NOT CHANGED    Details   raloxifene (EVISTA) 60 MG tablet TAKE 1 TABLET DAILY, Disp-90 tablet, R-3Normal      atorvastatin (LIPITOR) 40 MG tablet TAKE 1 TABLET NIGHTLY, Disp-90 tablet, R-4Normal      potassium chloride (KLOR-CON M) 20 MEQ extended release tablet TAKE 1 TABLET DAILY, Disp-90 tablet, R-4Normal      oxybutynin (DITROPAN-XL) 10 MG extended release tablet Take 1 tablet by mouth daily, Disp-90 tablet, R-3Normal      dicyclomine (BENTYL) 10 MG capsule Take 1 capsule by mouth every 6 hours as needed (cramps), Disp-20 capsule, R-0Print      acetaminophen (TYLENOL) 500 MG tablet 1-2 tabs PO q 8 hrs PRN, Disp-180 tablet, R-5Print      ketoconazole (NIZORAL) 2 % cream Apply BID PRN., Disp-30 g, R-1, Normal      levothyroxine (SYNTHROID) 25 MCG tablet TAKE 1 TABLET DAILY, Disp-90 tablet, R-3Normal      Handicap Placard MISC Starting Tu2018, Disp-1 each, R-0, PrintExpires 2023      DULoxetine (CYMBALTA) 30 MG extended release capsule Take 1 capsule by mouth daily, Disp-30 capsule, R-5Normal      gabapentin (NEURONTIN) 300 MG capsule Take 1 capsule by mouth 3 times daily, Disp-270 capsule, [id number]      folic acid (FOLVITE) 1 MG tablet Take 1 tablet by mouth daily, Disp-100 tablet, R-3Normal      vitamin B-12 (CYANOCOBALAMIN) 500 MCG tablet Take 1 tablet by mouth daily, Disp-90 tablet, R-3Normal      traZODone (DESYREL) 100 MG tablet Take 1 tablet by mouth nightly., Disp-90 tablet, R-3      rivastigmine (EXELON) 4.6 MG/24HR Place 1 patch onto the skin daily. aspirin 81 MG EC tablet Take 81 mg by mouth daily. ALLERGIES     is allergic to iodides; iodine; morphine; motrin [ibuprofen micronized]; and percocet [oxycodone-acetaminophen]. FAMILY HISTORY     She indicated that her mother is . She indicated that her father is . She indicated that her maternal grandmother is . She indicated that her maternal grandfather is . She indicated that her paternal grandmother is . She indicated that her paternal grandfather is . family history includes Asthma in her father; Cancer in her mother. SOCIAL HISTORY      reports that she has never smoked. She has never used smokeless tobacco. She reports that she does not drink alcohol or use drugs. PHYSICAL EXAM     INITIAL VITALS:  weight is 145 lb (65.8 kg).  Her oral temperature is 97.6 °F (36.4 °C). Her blood pressure is 137/70 and her pulse is 50. Her respiration is 18 and oxygen saturation is 94%. Physical Exam  Vitals signs and nursing note reviewed. Constitutional:       General: She is not in acute distress. Appearance: Normal appearance. She is well-developed. She is not ill-appearing, toxic-appearing or diaphoretic. HENT:      Head: Normocephalic and atraumatic. Right Ear: External ear normal.      Left Ear: External ear normal.      Nose: Nose normal.      Mouth/Throat:      Mouth: Mucous membranes are moist.      Pharynx: Oropharynx is clear. Eyes:      General: No scleral icterus. Right eye: No discharge. Left eye: No discharge. Conjunctiva/sclera: Conjunctivae normal.      Pupils: Pupils are equal, round, and reactive to light. Neck:      Musculoskeletal: Normal range of motion and neck supple. Normal range of motion. No edema, erythema, neck rigidity, crepitus, injury, pain with movement, torticollis, spinous process tenderness or muscular tenderness. Cardiovascular:      Rate and Rhythm: Normal rate and regular rhythm. Heart sounds: Normal heart sounds. No murmur. No friction rub. No gallop. Pulmonary:      Effort: Pulmonary effort is normal. No respiratory distress. Breath sounds: Normal breath sounds. No stridor. No wheezing, rhonchi or rales. Chest:      Chest wall: No tenderness. Abdominal:      General: Bowel sounds are normal. There is no distension. Palpations: Abdomen is soft. There is no mass. Tenderness: There is no abdominal tenderness. There is no guarding or rebound. Hernia: No hernia is present. Musculoskeletal: Normal range of motion. Lymphadenopathy:      Cervical: No cervical adenopathy. Skin:     General: Skin is warm and dry. Coloration: Skin is not pale. Findings: No erythema or rash.    Neurological:      Mental Status: She is alert and oriented to person, herself with a scarf. Within the department, I observed the patient's vital signs to be within acceptable range, and she was afebrile. On exam, I appreciated clear lung sounds. There is no chest wall or abdominal tenderness. The patient appears to be mentating at baseline. There is no bruising, edema, or tenderness of the neck. Laboratory work was reassuring. CO2 noted to be 19. UA is not suspicious for a UTI. Dr. Zaynab Mckeon, Psychiatry, was consulted and advises placement in a geriatric psychiatric facility, which will require transfer. While attempting to find placement for the patient, she began to complain of epigastric pain and pressure. The patient denied any chest pain, shortness of breath, dizziness, nausea, or vomiting. The patient reported mild discomfort with palpation of the epigastric abdomen, although there was no distention, guarding, rigidity, or rebound tenderness. The patient was bradycardic with a heart rate in the mid 40s. Review of the patient's past EKG also showed bradycardia. Patient does not appear to have symptomatic bradycardia at this time, and her vital signs will continue to be monitored pending facility placement. Within the department, the patient was treated with Pepcid and a GI cocktail. I observed the patient's condition to remain stable during the duration of the stay. Serial troponin is negative. COVID-19 swab is requested for facility placement and is pending at the time of shift change. At shift change, Rosalva Moncada, took over the patient's care. He will review results, order additional tests and labs, treat, consult, and admit or discharge as appropriate. CRITICAL CARE:   None    CONSULTS:  [facility]    Dr. Zaynab Mckeon, Psychiatry - advises placement in a geriatric psychiatric facility, which will require transfer    Dr. Adelaide Corona - reviewed the patient's previous EKG and noted bradycardia.  States that there is no acute intervention necessary at this time, as the patient's bradycardia appears to be asymptomatic. Believes that the patient continues to be medically clear and advises continued vital sign monitoring pending facility placement    PROCEDURES:  None    FINAL IMPRESSION      1. Depression with suicidal ideation    2. Suicide attempt ([address]    3. Bradycardia          DISPOSITION/PLAN   Emely Garcia, took over the patient's care. He will review results, order additional tests and labs, treat, consult, and admit or discharge as appropriate. PATIENT REFERRED TO:  No follow-up provider specified. DISCHARGE MEDICATIONS:  Discharge Medication List as of 5/29/2020 11:02 AM          (Please note that portions of this note were completed with a voice recognition program.  Efforts were made to edit the dictations but occasionally words are mis-transcribed.)    The patient was given an opportunity to see the Emergency Attending. The patient voiced understanding that I was a Mid-LevelProvider and was in agreement with being seen independently by myself. Scribe:  Ramsey Ko, 5/28/20, 11:16 PM EDT Scribing for and in the presence of MEME Goff . Signed by: Broderick Montilla, 05/29/20 2:27 PM    Provider:  I personally performed the services described in the documentation, reviewed and edited the documentation which was dictated to the scribe in my presence, and it accurately records my words and actions.     MEME Goff , 5/28/20, 2:27 PM        Corinna Smyth PA-C  05/29/20 [id number]       Corinna Smyth PA-C  06/02/20 5499

## 2020-05-30 PROCEDURE — 93010 ELECTROCARDIOGRAM REPORT: CPT | Performed by: INTERNAL MEDICINE

## 2020-05-31 NOTE — ED PROVIDER NOTES
I have discussed and I have reviewed the [facility]'s chart. Please refer to [facility]-level provider's chart for details. I agree with [facility]'s assessment and plan. ED COURSE    Patient was evaluated by [facility] provider initially. Patient has a stable ED stay. IMPRESSION  1. Depression with suicidal ideation    2. Suicide attempt ([facility] [address] [address]    3.  Bradycardia        DISPOSITION AND PLAN  Plan to transfer to other mental health facility  Jovani Garber M.D.        Jovani Garber MD  05/30/20 4249

## 2020-06-02 ASSESSMENT — ENCOUNTER SYMPTOMS
CONSTIPATION: 0
NAUSEA: 0
DIARRHEA: 0
ABDOMINAL PAIN: 0
BACK PAIN: 0
COUGH: 0
SHORTNESS OF BREATH: 0
TROUBLE SWALLOWING: 0
SORE THROAT: 0
COLOR CHANGE: 0
RHINORRHEA: 0
VOMITING: 0

## 2020-06-10 ENCOUNTER — TELEPHONE (OUTPATIENT)
Dept: FAMILY MEDICINE CLINIC | Age: 80
End: 2020-06-10

## 2020-06-10 NOTE — TELEPHONE ENCOUNTER
.Transition of Care visit scheduled.   6/15/2020  Patient is being discharged to [facility]  Date of discharge 6/11/2020  Discharge from facility [facility]  Reason for admission Suicide Attempt major depression

## 2020-06-10 NOTE — TELEPHONE ENCOUNTER
Pt is still currently admitted @ [facility] geriatric psych  Pt will need called tommorrow

## 2020-06-13 ENCOUNTER — HOSPITAL ENCOUNTER (EMERGENCY)
Age: 80
Discharge: HOME | End: 2020-06-14
Attending: FAMILY MEDICINE
Payer: MEDICARE

## 2020-06-13 LAB
ALBUMIN SERPL-MCNC: 3.1 G/DL (ref 3.5–5.1)
ALP BLD-CCNC: 56 U/L (ref 38–126)
ALT SERPL-CCNC: 10 U/L (ref 11–66)
AMPHETAMINE+METHAMPHETAMINE URINE SCREEN: NEGATIVE
ANION GAP SERPL CALCULATED.3IONS-SCNC: 10 MEQ/L (ref 8–16)
AST SERPL-CCNC: 16 U/L (ref 5–40)
BACTERIA: ABNORMAL /HPF
BARBITURATE QUANTITATIVE URINE: NEGATIVE
BASOPHILS # BLD: 0.6 %
BASOPHILS ABSOLUTE: 0.1 THOU/MM3 (ref 0–0.1)
BENZODIAZEPINE QUANTITATIVE URINE: NEGATIVE
BILIRUB SERPL-MCNC: 0.4 MG/DL (ref 0.3–1.2)
BILIRUBIN URINE: NEGATIVE
BLOOD, URINE: NEGATIVE
BUN BLDV-MCNC: 13 MG/DL (ref 7–22)
CALCIUM SERPL-MCNC: 8.7 MG/DL (ref 8.5–10.5)
CANNABINOID QUANTITATIVE URINE: NEGATIVE
CASTS 2: ABNORMAL /LPF
CASTS UA: ABNORMAL /LPF
CHARACTER, URINE: CLEAR
CHLORIDE BLD-SCNC: 101 MEQ/L (ref 98–111)
CO2: 24 MEQ/L (ref 23–33)
COCAINE METABOLITE QUANTITATIVE URINE: NEGATIVE
COLOR: YELLOW
CREAT SERPL-MCNC: 0.6 MG/DL (ref 0.4–1.2)
CRYSTALS, UA: ABNORMAL
EOSINOPHIL # BLD: 1.4 %
EOSINOPHILS ABSOLUTE: 0.1 THOU/MM3 (ref 0–0.4)
EPITHELIAL CELLS, UA: ABNORMAL /HPF
ERYTHROCYTE [DISTWIDTH] IN BLOOD BY AUTOMATED COUNT: 13.2 % (ref 11.5–14.5)
ERYTHROCYTE [DISTWIDTH] IN BLOOD BY AUTOMATED COUNT: 51.8 FL (ref 35–45)
GFR SERPL CREATININE-BSD FRML MDRD: > 90 ML/MIN/1.73M2
GLUCOSE BLD-MCNC: 90 MG/DL (ref 70–108)
GLUCOSE URINE: NEGATIVE MG/DL
HCT VFR BLD CALC: 39.3 % (ref 37–47)
HEMOGLOBIN: 12.5 GM/DL (ref 12–16)
IMMATURE GRANS (ABS): 0.02 THOU/MM3 (ref 0–0.07)
IMMATURE GRANULOCYTES: 0.2 %
KETONES, URINE: NEGATIVE
LEUKOCYTE ESTERASE, URINE: ABNORMAL
LYMPHOCYTES # BLD: 26.1 %
LYMPHOCYTES ABSOLUTE: 2.2 THOU/MM3 (ref 1–4.8)
MCH RBC QN AUTO: 33.6 PG (ref 26–33)
MCHC RBC AUTO-ENTMCNC: 31.8 GM/DL (ref 32.2–35.5)
MCV RBC AUTO: 105.6 FL (ref 81–99)
MISCELLANEOUS 2: ABNORMAL
MONOCYTES # BLD: 7 %
MONOCYTES ABSOLUTE: 0.6 THOU/MM3 (ref 0.4–1.3)
NITRITE, URINE: NEGATIVE
NUCLEATED RED BLOOD CELLS: 0 /100 WBC
OPIATES, URINE: NEGATIVE
OSMOLALITY CALCULATION: 269.7 MOSMOL/KG (ref 275–300)
OXYCODONE: NEGATIVE
PH UA: 6 (ref 5–9)
PHENCYCLIDINE QUANTITATIVE URINE: NEGATIVE
PLATELET # BLD: 238 THOU/MM3 (ref 130–400)
PMV BLD AUTO: 11.3 FL (ref 9.4–12.4)
POTASSIUM REFLEX MAGNESIUM: 4 MEQ/L (ref 3.5–5.2)
PROTEIN UA: NEGATIVE
RBC # BLD: 3.72 MILL/MM3 (ref 4.2–5.4)
RBC URINE: ABNORMAL /HPF
RENAL EPITHELIAL, UA: ABNORMAL
SEG NEUTROPHILS: 64.7 %
SEGMENTED NEUTROPHILS ABSOLUTE COUNT: 5.5 THOU/MM3 (ref 1.8–7.7)
SODIUM BLD-SCNC: 135 MEQ/L (ref 135–145)
SPECIFIC GRAVITY, URINE: <= 1.005 (ref 1–1.03)
TOTAL PROTEIN: 6.3 G/DL (ref 6.1–8)
TSH SERPL DL<=0.05 MIU/L-ACNC: 1.43 UIU/ML (ref 0.4–4.2)
UROBILINOGEN, URINE: 0.2 EU/DL (ref 0–1)
WBC # BLD: 8.5 THOU/MM3 (ref 4.8–10.8)
WBC UA: ABNORMAL /HPF
YEAST: ABNORMAL

## 2020-06-13 PROCEDURE — 80053 COMPREHEN METABOLIC PANEL: CPT

## 2020-06-13 PROCEDURE — 84443 ASSAY THYROID STIM HORMONE: CPT

## 2020-06-13 PROCEDURE — 85025 COMPLETE CBC W/AUTO DIFF WBC: CPT

## 2020-06-13 PROCEDURE — 80307 DRUG TEST PRSMV CHEM ANLYZR: CPT

## 2020-06-13 PROCEDURE — 99285 EMERGENCY DEPT VISIT HI MDM: CPT

## 2020-06-13 PROCEDURE — 81001 URINALYSIS AUTO W/SCOPE: CPT

## 2020-06-13 PROCEDURE — 36415 COLL VENOUS BLD VENIPUNCTURE: CPT

## 2020-06-13 ASSESSMENT — PATIENT HEALTH QUESTIONNAIRE - PHQ9: SUM OF ALL RESPONSES TO PHQ QUESTIONS 1-9: 4

## 2020-06-13 ASSESSMENT — SLEEP AND FATIGUE QUESTIONNAIRES
DO YOU USE A SLEEP AID: NO
DO YOU HAVE DIFFICULTY SLEEPING: NO
AVERAGE NUMBER OF SLEEP HOURS: 8

## 2020-06-13 ASSESSMENT — LIFESTYLE VARIABLES: HISTORY_ALCOHOL_USE: NO

## 2020-06-13 NOTE — BH NOTE
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 She was assessed on 5/28/20 and transferred to [facility] in [place], [place], where she stayed for about two weeks (was discharged on 6/12/20). Patient is very confused as she has very little recent memory. POA Kristin Hatchet, daughter) states she was diagnosed with Alzheimer's Disease a few years ago and memory has continued to decline. Current  Treatment: Patient sees Dr. Dru Pagan for psychiatry through [facility] in [facility]. POPARESH (Kristin Hatchet, daughter) reports that when she was hospitalized at [facility] that there were a litany of medication changes. Bhavesh faxed medication list to [facility] AN AFFILIATE OF [facility] clinician which is as follows: Lexapro, Synthroid, Namenda, Ditropan XL, Potassium Cl ER, Rivastigmine, Atorvastatin, Mirtazapine, Raloxifene, Acetaminophen, Loperamide, Duloxetine, Aripiprzaole, and Trazodone (gave completed list with dosage and frequency to patient's nurse). Present Suicidal Behavior:      Verbal: Patient denies. States she does not remember. Attempt: Patient denies. Access to Weapons: Patient denies. Current Suicide Risk: Low, Moderate or High: Low. Past Suicidal Behavior:       Verbal: Patient denies. States she does not remember. Attempt: Patient denies. States she does not remember. Self-Injurious/Self-Mutilation: Patient denies. States she does not remember. Traumatic Event Within Past 2 Weeks: Patient denies. States she does not remember. Current Abuse: Patient denies. Legal: Patient denies. Violence: Patient denies. Protective Factors: Medication compliance with help from staff at [facility].  Also has a good support system (primarily her was tangential and thought content appeared normal. She had very poor recent memory and was a poor historian. She exhibited poor insight infrequently. Level of Care Disposition:      Consulted with medical provider. Patient is medically cleared. Consulted with Dr. Emelyn Caceres. Patient to be discharged.

## 2020-06-13 NOTE — ED NOTES
Pt resting on cot, pt denies needs at this time. Pt is only oriented to self. Pt daughter remains at bedside. Along with suicide sitter. Will continue to  Monitor.      Guillermina Petersen RN  06/13/20 1924

## 2020-06-13 NOTE — ED PROVIDER NOTES
EMERGENCY MEDICINE DEPARTMENT ENCOUNTER      CHIEF COMPLAINT    Chief Complaint   Patient presents with    Suicidal       HPI    Mckayla Gonsalez is a 68 y.o. female living at nursing home who presents with generalized acute agitation since the onset the past few days. Pt mentioned not wanting to live anymore. The duration has been constant since the onset. The generalized confusion may be associated with her frequent sundowning. Pt currently denies feeling suicidal but admits to anxiety. She is feeling much better after her daughter came to see her. No obvious aggravating or alleviating factors other than her nursing home condition. REVIEW OF SYSTEMS    Cardiac: No chest pain or palpitations  Respiratory: No shortness of breath or new cough  General: No fevers   : No dysuria or hematuria  GI: No vomiting or diarrhea  Psych: +agitation, voices depression and vague SI  See HPI for further details. All other systems reviewed and are negative. PAST MEDICAL OR SURGICAL HISTORY    Past Medical History:   Diagnosis Date    Anxiety     Arthritis     Bleeding disorder ([address]     CAD (coronary artery disease)     Stent in 1st diagonal in 2007    Cataract     Celiac disease     Dementia ([address]     Depression     Fibromyalgia     Hearing impairment     HTN (hypertension)     Hyperlipemia     MI (myocardial infarction) ([address] 2003    Osteoporosis     Pernicious anemia     Pneumonia     Thyroid disease     Urinary incontinence     Vitamin B12 deficiency     Vitamin K deficiency      Past Surgical History:   Procedure Laterality Date    APPENDECTOMY  1963    BLADDER REPAIR      x2    CHOLECYSTECTOMY  1963    COLONOSCOPY  2004, 2014, 2015    CORONARY ANGIOPLASTY WITH STENT PLACEMENT  5/2007    HYSTERECTOMY  1964    KNEE ARTHROSCOPY Left 07/19/2016    Dr Virginie Webster HISTORY  5-16-14    Colonoscopy  2.  [facility] Hemorrhoidpexy    TONSILLECTOMY  as a child     TOTAL KNEE ARTHROPLASTY

## 2020-06-14 VITALS
TEMPERATURE: 98.7 F | HEART RATE: 55 BPM | RESPIRATION RATE: 19 BRPM | SYSTOLIC BLOOD PRESSURE: 170 MMHG | OXYGEN SATURATION: 100 % | DIASTOLIC BLOOD PRESSURE: 62 MMHG

## 2020-06-14 NOTE — ED NOTES
Pt daughter informed of situation. Pt resting on cot with eyes closed. Pt respirations easy and unlabored. Suicide sitter at bedside.       Flores Murillo RN  06/13/20 7048

## 2020-06-14 NOTE — ED NOTES
Iván RAI at [facility] is refusing to let pt return to [facility], stating \"this pt is unsafe to return. \"      Yesenia Julien, CAYDEN  06/13/20 6739

## 2020-06-14 NOTE — ED NOTES
Pt continues to rest on cot with eyes closed. Respirations easy and unlabored.  Daughter and suicide sitter remain at bedside     Germania Agustin RN  06/13/20 7366

## 2020-06-15 ENCOUNTER — TELEPHONE (OUTPATIENT)
Dept: FAMILY MEDICINE CLINIC | Age: 80
End: 2020-06-15

## 2020-06-17 ENCOUNTER — HOSPITAL ENCOUNTER (OUTPATIENT)
Age: 80
Setting detail: SPECIMEN
Discharge: HOME | End: 2020-06-17

## 2020-06-17 LAB
CHOLESTEROL/HDL RATIO: 2.2
CHOLESTEROL: 119 MG/DL
HDLC SERPL-MCNC: 53 MG/DL
LDL CHOLESTEROL: 44 MG/DL (ref 0–130)
TRIGL SERPL-MCNC: 110 MG/DL
VLDLC SERPL CALC-MCNC: NORMAL MG/DL (ref 1–30)

## 2020-06-17 PROCEDURE — 80061 LIPID PANEL: CPT

## 2020-06-17 PROCEDURE — 36415 COLL VENOUS BLD VENIPUNCTURE: CPT

## 2020-06-17 PROCEDURE — P9604 ONE-WAY ALLOW PRORATED TRIP: HCPCS

## 2020-06-24 ENCOUNTER — HOSPITAL ENCOUNTER (OUTPATIENT)
Age: 80
Setting detail: SPECIMEN
Discharge: HOME | End: 2020-06-24
Payer: COMMERCIAL

## 2020-06-24 PROCEDURE — U0003 INFECTIOUS AGENT DETECTION BY NUCLEIC ACID (DNA OR RNA); SEVERE ACUTE RESPIRATORY SYNDROME CORONAVIRUS 2 (SARS-COV-2) (CORONAVIRUS DISEASE [COVID-19]), AMPLIFIED PROBE TECHNIQUE, MAKING USE OF HIGH THROUGHPUT TECHNOLOGIES AS DESCRIBED BY CMS-2020-01-R: HCPCS

## 2020-06-25 LAB
SARS-COV-2, PCR: NORMAL
SARS-COV-2, RAPID: NORMAL
SARS-COV-2: NOT DETECTED
SOURCE: NORMAL

## 2020-06-26 ENCOUNTER — TELEPHONE (OUTPATIENT)
Dept: PRIMARY CARE CLINIC | Age: 80
End: 2020-06-26

## 2020-07-06 ENCOUNTER — APPOINTMENT (OUTPATIENT)
Dept: GENERAL RADIOLOGY | Age: 80
End: 2020-07-06
Payer: MEDICARE

## 2020-07-06 ENCOUNTER — HOSPITAL ENCOUNTER (EMERGENCY)
Age: 80
Discharge: HOME | End: 2020-07-06
Payer: MEDICARE

## 2020-07-06 ENCOUNTER — APPOINTMENT (OUTPATIENT)
Dept: CT IMAGING | Age: 80
End: 2020-07-06
Payer: MEDICARE

## 2020-07-06 VITALS
WEIGHT: 129.31 LBS | SYSTOLIC BLOOD PRESSURE: 112 MMHG | RESPIRATION RATE: 15 BRPM | HEIGHT: 57 IN | TEMPERATURE: 98.1 F | BODY MASS INDEX: 27.9 KG/M2 | OXYGEN SATURATION: 100 % | HEART RATE: 88 BPM | DIASTOLIC BLOOD PRESSURE: 94 MMHG

## 2020-07-06 LAB
ALBUMIN SERPL-MCNC: 3.7 G/DL (ref 3.5–5.1)
ALP BLD-CCNC: 64 U/L (ref 38–126)
ALT SERPL-CCNC: 13 U/L (ref 11–66)
ANION GAP SERPL CALCULATED.3IONS-SCNC: 13 MEQ/L (ref 8–16)
AST SERPL-CCNC: 18 U/L (ref 5–40)
BASOPHILS # BLD: 0.4 %
BASOPHILS ABSOLUTE: 0 THOU/MM3 (ref 0–0.1)
BILIRUB SERPL-MCNC: 0.4 MG/DL (ref 0.3–1.2)
BILIRUBIN DIRECT: < 0.2 MG/DL (ref 0–0.3)
BILIRUBIN URINE: NEGATIVE
BLOOD, URINE: NEGATIVE
BUN BLDV-MCNC: 16 MG/DL (ref 7–22)
CALCIUM SERPL-MCNC: 9.1 MG/DL (ref 8.5–10.5)
CHARACTER, URINE: CLEAR
CHLORIDE BLD-SCNC: 104 MEQ/L (ref 98–111)
CO2: 21 MEQ/L (ref 23–33)
COLOR: YELLOW
CREAT SERPL-MCNC: 0.6 MG/DL (ref 0.4–1.2)
EKG ATRIAL RATE: 61 BPM
EKG P AXIS: 47 DEGREES
EKG P-R INTERVAL: 144 MS
EKG Q-T INTERVAL: 428 MS
EKG QRS DURATION: 80 MS
EKG QTC CALCULATION (BAZETT): 430 MS
EKG R AXIS: -7 DEGREES
EKG T AXIS: 33 DEGREES
EKG VENTRICULAR RATE: 61 BPM
EOSINOPHIL # BLD: 1.5 %
EOSINOPHILS ABSOLUTE: 0.1 THOU/MM3 (ref 0–0.4)
ERYTHROCYTE [DISTWIDTH] IN BLOOD BY AUTOMATED COUNT: 12.8 % (ref 11.5–14.5)
ERYTHROCYTE [DISTWIDTH] IN BLOOD BY AUTOMATED COUNT: 48.8 FL (ref 35–45)
GFR SERPL CREATININE-BSD FRML MDRD: > 90 ML/MIN/1.73M2
GLUCOSE BLD-MCNC: 91 MG/DL (ref 70–108)
GLUCOSE URINE: NEGATIVE MG/DL
HCT VFR BLD CALC: 42.5 % (ref 37–47)
HEMOGLOBIN: 13.6 GM/DL (ref 12–16)
IMMATURE GRANS (ABS): 0.03 THOU/MM3 (ref 0–0.07)
IMMATURE GRANULOCYTES: 0.3 %
KETONES, URINE: NEGATIVE
LEUKOCYTE ESTERASE, URINE: NEGATIVE
LYMPHOCYTES # BLD: 29.6 %
LYMPHOCYTES ABSOLUTE: 2.8 THOU/MM3 (ref 1–4.8)
MCH RBC QN AUTO: 33.1 PG (ref 26–33)
MCHC RBC AUTO-ENTMCNC: 32 GM/DL (ref 32.2–35.5)
MCV RBC AUTO: 103.4 FL (ref 81–99)
MONOCYTES # BLD: 7.7 %
MONOCYTES ABSOLUTE: 0.7 THOU/MM3 (ref 0.4–1.3)
NITRITE, URINE: NEGATIVE
NUCLEATED RED BLOOD CELLS: 0 /100 WBC
OSMOLALITY CALCULATION: 276.4 MOSMOL/KG (ref 275–300)
PH UA: 6.5 (ref 5–9)
PLATELET # BLD: 223 THOU/MM3 (ref 130–400)
PLATELET ESTIMATE: ADEQUATE
PMV BLD AUTO: 11.8 FL (ref 9.4–12.4)
POTASSIUM SERPL-SCNC: 4.2 MEQ/L (ref 3.5–5.2)
PROTEIN UA: NEGATIVE
RBC # BLD: 4.11 MILL/MM3 (ref 4.2–5.4)
SCAN OF BLOOD SMEAR: NORMAL
SEG NEUTROPHILS: 60.5 %
SEGMENTED NEUTROPHILS ABSOLUTE COUNT: 5.7 THOU/MM3 (ref 1.8–7.7)
SODIUM BLD-SCNC: 138 MEQ/L (ref 135–145)
SPECIFIC GRAVITY, URINE: 1.02 (ref 1–1.03)
TOTAL PROTEIN: 7 G/DL (ref 6.1–8)
TROPONIN T: < 0.01 NG/ML
UROBILINOGEN, URINE: 0.2 EU/DL (ref 0–1)
WBC # BLD: 9.5 THOU/MM3 (ref 4.8–10.8)

## 2020-07-06 PROCEDURE — 80053 COMPREHEN METABOLIC PANEL: CPT

## 2020-07-06 PROCEDURE — 81003 URINALYSIS AUTO W/O SCOPE: CPT

## 2020-07-06 PROCEDURE — 85025 COMPLETE CBC W/AUTO DIFF WBC: CPT

## 2020-07-06 PROCEDURE — 71046 X-RAY EXAM CHEST 2 VIEWS: CPT

## 2020-07-06 PROCEDURE — 82248 BILIRUBIN DIRECT: CPT

## 2020-07-06 PROCEDURE — 72125 CT NECK SPINE W/O DYE: CPT

## 2020-07-06 PROCEDURE — 36415 COLL VENOUS BLD VENIPUNCTURE: CPT

## 2020-07-06 PROCEDURE — 99284 EMERGENCY DEPT VISIT MOD MDM: CPT

## 2020-07-06 PROCEDURE — 70450 CT HEAD/BRAIN W/O DYE: CPT

## 2020-07-06 PROCEDURE — 84484 ASSAY OF TROPONIN QUANT: CPT

## 2020-07-06 PROCEDURE — 72190 X-RAY EXAM OF PELVIS: CPT

## 2020-07-06 PROCEDURE — 93005 ELECTROCARDIOGRAM TRACING: CPT | Performed by: PHYSICIAN ASSISTANT

## 2020-07-06 RX ORDER — QUETIAPINE FUMARATE 25 MG/1
25 TABLET, FILM COATED ORAL DAILY
Status: ON HOLD | COMMUNITY
End: 2020-07-15 | Stop reason: SDUPTHER

## 2020-07-06 RX ORDER — TIZANIDINE 4 MG/1
4 TABLET ORAL 2 TIMES DAILY PRN
Status: ON HOLD | COMMUNITY
End: 2020-07-15 | Stop reason: HOSPADM

## 2020-07-06 RX ORDER — POLYETHYLENE GLYCOL 3350 17 G/17G
17 POWDER, FOR SOLUTION ORAL DAILY PRN
COMMUNITY

## 2020-07-06 RX ORDER — HALOPERIDOL 5 MG
5 TABLET ORAL 4 TIMES DAILY PRN
Status: ON HOLD | COMMUNITY
End: 2020-07-15 | Stop reason: HOSPADM

## 2020-07-06 RX ORDER — ARIPIPRAZOLE 2 MG/1
2 TABLET ORAL DAILY
Status: ON HOLD | COMMUNITY
End: 2020-07-15 | Stop reason: HOSPADM

## 2020-07-06 RX ORDER — AMLODIPINE BESYLATE 5 MG/1
5 TABLET ORAL DAILY
COMMUNITY

## 2020-07-06 RX ORDER — NYSTATIN 100000 [USP'U]/G
POWDER TOPICAL 4 TIMES DAILY
COMMUNITY

## 2020-07-06 RX ORDER — MEMANTINE HYDROCHLORIDE 14 MG/1
14 CAPSULE, EXTENDED RELEASE ORAL DAILY
Status: ON HOLD | COMMUNITY
End: 2020-07-15 | Stop reason: HOSPADM

## 2020-07-06 RX ORDER — LORAZEPAM 1 MG/1
1 TABLET ORAL EVERY 6 HOURS PRN
COMMUNITY
End: 2020-09-25 | Stop reason: SDUPTHER

## 2020-07-06 NOTE — ED NOTES
Patient resting on cart. Patient denies pain. Daughter at bedside. Patient updated on plan of care by Silvana Lopez. PA. Side rails up times 2. Call light in reach.      Ashley Barrios RN  07/06/20 1456

## 2020-07-06 NOTE — ED NOTES
Patient presents to ED via EMS from [address] [place] [address]  dementia unit with report of fall today while standing. EMS states patient glucose was 87 enroute. Patient is poor historian with dementia. Daughter at bedside. Daughter states patient was at [facility] for past 3 weeks and had just arrived at [address] [place] [address] today. Patient denies pain. Respirations unlabored. No signs of head injury, patient states her tongue tip is stinging, no bleeding noted in mouth or injury noted to tongue. Medication list reviewed and revised.      Claire Bhardwaj RN  07/06/20 3559

## 2020-07-06 NOTE — ED NOTES
ED nurse-to-nurse bedside report    Chief Complaint   Patient presents with    Fall      LOC: alert to only name  Vital signs   Vitals:    07/06/20 1518 07/06/20 1626   BP: (!) 146/64 (!) 156/72   Pulse: 66 61   Resp: 18 18   Temp: 98.1 °F (36.7 °C)    TempSrc: Axillary    SpO2: 100% 100%   Weight: 129 lb 5 oz (58.7 kg)    Height: 4' 9\" (1.448 m)       Pain:    Pain Interventions: None  Pain Goal: Denies pain  Oxygen: No    Current needs required Room Air   Telemetry: Yes  LDAs:   Peripheral IV 07/06/20 Left Antecubital (Active)     Continuous Infusions:   Mobility: Requires assistance * 1  Ziegler Fall Risk Score:    Fall Risk 12/6/2018 10/10/2017 10/17/2015 6/3/2014   2 or more falls in past year? no no no no   Fall with injury in past year? no no no no     Fall Interventions: High  Report given to: CAYDEN Robert RN  07/06/20 1049

## 2020-07-06 NOTE — ED NOTES
Pt is new to [facility]. Pt walked out of room and went into another pts room, tripped and fell and hit head. Pt has a Hx of dementia and is baseline confused.       Vivian Putnam RN  07/06/20 0394

## 2020-07-06 NOTE — ED NOTES
Bed: 010A  Expected date: 7/6/20  Expected time:   Means of arrival:   Comments:      Elisabet Ortiz RN  07/06/20 3065

## 2020-07-07 ENCOUNTER — OUTSIDE SERVICES (OUTPATIENT)
Dept: FAMILY MEDICINE CLINIC | Age: 80
End: 2020-07-07
Payer: MEDICARE

## 2020-07-07 VITALS
SYSTOLIC BLOOD PRESSURE: 134 MMHG | BODY MASS INDEX: 29.97 KG/M2 | WEIGHT: 138.5 LBS | DIASTOLIC BLOOD PRESSURE: 70 MMHG | TEMPERATURE: 98.1 F | RESPIRATION RATE: 16 BRPM | HEART RATE: 82 BPM

## 2020-07-07 PROBLEM — F41.9 ANXIETY: Status: ACTIVE | Noted: 2020-07-07

## 2020-07-07 PROCEDURE — 99490 CHRNC CARE MGMT STAFF 1ST 20: CPT | Performed by: NURSE PRACTITIONER

## 2020-07-07 RX ORDER — ACETAMINOPHEN 325 MG/1
TABLET ORAL
Qty: 2 TABLET | Refills: 5 | Status: SHIPPED
Start: 2020-07-07

## 2020-07-07 RX ORDER — LOPERAMIDE HYDROCHLORIDE 2 MG/1
2 CAPSULE ORAL 4 TIMES DAILY PRN
COMMUNITY

## 2020-07-07 RX ORDER — DULOXETIN HYDROCHLORIDE 30 MG/1
60 CAPSULE, DELAYED RELEASE ORAL DAILY
Qty: 30 CAPSULE | Refills: 5 | Status: SHIPPED
Start: 2020-07-07

## 2020-07-07 ASSESSMENT — ENCOUNTER SYMPTOMS
SHORTNESS OF BREATH: 0
CONSTIPATION: 0

## 2020-07-07 NOTE — PROGRESS NOTES
Taylor Love is a 68 y.o. female who presents today for her medical conditions/complaints as noted below. Chief Complaint   Patient presents with    New Patient     Admission to the assisted living           HPI:     Briana Sargent" is seen today as a new patient at [address] [place] [address] . She was admitted to the facility on 7/3 after an admission to [address] [place] [address] at [place] [facility]. She had been at [facility] in [address] [place] [address] and approximately 2 weeks prior to being admitted to the [address] [place] [address] she had wrapped a night gown around her neck. She was sent to [facility] in [place] [address] and returned to [facility] on 6/13. Upon arrival to the AL she said she wanted to kill herself and was terful and agitated. She did not have a plan to harm herself. She was sent to [facility] and then was cleared to go back to the AL. She was then admitted to [address] [place] [address] on 6/15. She has other PMH of CAD, Dementia, HTN, HLD, depression, Hypothyroidism, B12 deficiency, OA, Celiac disease, OA, and Fibromyalgia. She has had no behaviors per nursing since admission. She did however have a fall and was sent to ER for eval and returned without changes to her regimen. Labs and scans reviewed from ER. She is sitting up in her room today and denies complaints but does not converse well.        Past Medical History:   Diagnosis Date    Anxiety     Arthritis     Bleeding disorder ([address]     CAD (coronary artery disease)     Stent in 1st diagonal in 2007    Cataract     Celiac disease     Dementia ([address]     Depression     Fibromyalgia     Hearing impairment     HTN (hypertension)     Hyperlipemia     MI (myocardial infarction) ([address] 2003    Osteoporosis     Pernicious anemia     Pneumonia     Thyroid disease     Urinary incontinence     Vitamin B12 deficiency     Vitamin K deficiency       Past Surgical History:   Procedure Laterality Date    APPENDECTOMY  1963    BLADDER REPAIR      x2   [facility] [address]    COLONOSCOPY  2004, 2014, 2015    CORONARY ANGIOPLASTY WITH STENT PLACEMENT  5/2007    HYSTERECTOMY  1964    KNEE ARTHROSCOPY Left 07/19/2016    Dr Kelly Thomson HISTORY  5-16-14    Colonoscopy  2. [facility] Hemorrhoidpexy    TONSILLECTOMY  as a child     TOTAL KNEE ARTHROPLASTY      UPPER GASTROINTESTINAL ENDOSCOPY  2004       Family History   Problem Relation Age of Onset    Cancer Mother         liver, lung    Asthma Father        Social History     Tobacco Use    Smoking status: Never Smoker    Smokeless tobacco: Never Used   Substance Use Topics    Alcohol use: No      Allergies   Allergen Reactions    Iodides Hives    Iodine Hives    Morphine Hives    Motrin [Ibuprofen Micronized] Hives    Percocet [Oxycodone-Acetaminophen]      Pt report \"makes me Short of breath\"        Health Maintenance   Topic Date Due    Shingles Vaccine (1 of 2) 05/30/1992    Annual Wellness Visit (AWV)  05/29/2019    Flu vaccine (1) 09/01/2020    TSH testing  06/13/2021    Lipid screen  06/17/2021    DTaP/Tdap/Td vaccine (2 - Td) 08/06/2026    DEXA (modify frequency per FRAX score)  Completed    Pneumococcal 65+ years Vaccine  Completed    Hepatitis A vaccine  Aged Out    Hepatitis B vaccine  Aged Out    Hib vaccine  Aged Out    Meningococcal (ACWY) vaccine  Aged Out       Subjective:      Review of Systems   Unable to perform ROS: Dementia (limited)   Constitutional: Positive for activity change and fatigue. Negative for unexpected weight change. HENT: Positive for hearing loss. Eyes: Negative for visual disturbance. Respiratory: Negative for shortness of breath. Cardiovascular: Negative for chest pain. Gastrointestinal: Negative for constipation. Musculoskeletal: Positive for gait problem (one falls since admission). Neurological: Positive for weakness. Psychiatric/Behavioral: Positive for confusion. Objective:     Physical Exam  Vitals signs and nursing note reviewed.    Constitutional:       General: She is Cognition and Memory: Memory is impaired. She exhibits impaired recent memory and impaired remote memory. /70   Pulse 82   Temp 98.1 °F (36.7 °C)   Resp 16   Wt 138 lb 8 oz (62.8 kg)   BMI 29.97 kg/m²     Assessment/Plan      1. Dementia with behavioral disturbance, unspecified dementia type ([facility]  Chronic and 2 recent admissions to [facility] psych. Will continue Namenda, Trazadone, Abilitfy, Cymbalta, Haldol, Ativan, Seroquel. Will review meds and if prn not used will dc. Monitor for falls and monitor weights. 2. Major depressive disorder, recurrent episode, severe, with psychosis ([facility] [address]   2 recent admissions for MDD. Will continue Cymbalta, abilify, and Trazodone. 3. Anxiety - no s/s. Will monitor and continue Cymbalta and Ativan. 4. Essential hypertension - chronic and continue Norvasc. Labs added. 5. Celiac disease - ensure GF diet   6. Coronary artery disease involving native coronary artery of native heart without angina pectoris   Denies chest pain. Continue to monitor. Labs reviewed and added. 7. Other specified hypothyroidism - TSH reviewed and stable. Continue Levothyroxine and will add labs. 8. Hyperlipidemia, unspecified hyperlipidemia type - Chronic and continue Lipitor and labs added for monitoring. Resident has Celiac disease, MDD, Dementia, CAD, HTN, HLD and it is expected to last 15 or more months. These chronic conditions place resident at a significant risk of death, acute exacerbation, decline in function or functional decline. Her comprehensive plan was established today. I spent 20 minutes reviewing plan. Patient seen and examined. History partially obtained by chart review and nursing notes. I have reviewed patient's past medical, surgical, social, and family history and have made updates where appropriate.   See facility EMR for updated medication list.       Electronically signed by CATRINA Barakat CNP on 7/7/2020 at 11:00 AM

## 2020-07-13 ENCOUNTER — APPOINTMENT (OUTPATIENT)
Dept: GENERAL RADIOLOGY | Age: 80
DRG: 884 | End: 2020-07-13
Payer: MEDICARE

## 2020-07-13 ENCOUNTER — APPOINTMENT (OUTPATIENT)
Dept: CT IMAGING | Age: 80
DRG: 884 | End: 2020-07-13
Payer: MEDICARE

## 2020-07-13 ENCOUNTER — HOSPITAL ENCOUNTER (INPATIENT)
Age: 80
LOS: 1 days | Discharge: ANOTHER HOSPITAL | DRG: 884 | End: 2020-07-15
Attending: FAMILY MEDICINE | Admitting: FAMILY MEDICINE
Payer: MEDICARE

## 2020-07-13 PROBLEM — R29.6 MULTIPLE FALLS: Status: ACTIVE | Noted: 2020-07-13

## 2020-07-13 LAB
ANION GAP SERPL CALCULATED.3IONS-SCNC: 13 MEQ/L (ref 8–16)
BASOPHILS # BLD: 0.5 %
BASOPHILS ABSOLUTE: 0 THOU/MM3 (ref 0–0.1)
BUN BLDV-MCNC: 14 MG/DL (ref 7–22)
CALCIUM SERPL-MCNC: 8.7 MG/DL (ref 8.5–10.5)
CHLORIDE BLD-SCNC: 107 MEQ/L (ref 98–111)
CO2: 22 MEQ/L (ref 23–33)
CREAT SERPL-MCNC: 0.5 MG/DL (ref 0.4–1.2)
EKG ATRIAL RATE: 57 BPM
EKG P AXIS: 53 DEGREES
EKG P-R INTERVAL: 140 MS
EKG Q-T INTERVAL: 440 MS
EKG QRS DURATION: 86 MS
EKG QTC CALCULATION (BAZETT): 428 MS
EKG R AXIS: -18 DEGREES
EKG T AXIS: 34 DEGREES
EKG VENTRICULAR RATE: 57 BPM
EOSINOPHIL # BLD: 1.2 %
EOSINOPHILS ABSOLUTE: 0.1 THOU/MM3 (ref 0–0.4)
ERYTHROCYTE [DISTWIDTH] IN BLOOD BY AUTOMATED COUNT: 12.8 % (ref 11.5–14.5)
ERYTHROCYTE [DISTWIDTH] IN BLOOD BY AUTOMATED COUNT: 49.3 FL (ref 35–45)
GFR SERPL CREATININE-BSD FRML MDRD: > 90 ML/MIN/1.73M2
GLUCOSE BLD-MCNC: 89 MG/DL (ref 70–108)
HCT VFR BLD CALC: 40.9 % (ref 37–47)
HEMOGLOBIN: 13.2 GM/DL (ref 12–16)
IMMATURE GRANS (ABS): 0.02 THOU/MM3 (ref 0–0.07)
IMMATURE GRANULOCYTES: 0.3 %
LYMPHOCYTES # BLD: 25.4 %
LYMPHOCYTES ABSOLUTE: 1.9 THOU/MM3 (ref 1–4.8)
MCH RBC QN AUTO: 33.6 PG (ref 26–33)
MCHC RBC AUTO-ENTMCNC: 32.3 GM/DL (ref 32.2–35.5)
MCV RBC AUTO: 104.1 FL (ref 81–99)
MONOCYTES # BLD: 7.4 %
MONOCYTES ABSOLUTE: 0.5 THOU/MM3 (ref 0.4–1.3)
NUCLEATED RED BLOOD CELLS: 0 /100 WBC
OSMOLALITY CALCULATION: 283.1 MOSMOL/KG (ref 275–300)
PLATELET # BLD: 253 THOU/MM3 (ref 130–400)
PMV BLD AUTO: 11.1 FL (ref 9.4–12.4)
POTASSIUM SERPL-SCNC: 4.5 MEQ/L (ref 3.5–5.2)
RBC # BLD: 3.93 MILL/MM3 (ref 4.2–5.4)
SEG NEUTROPHILS: 65.2 %
SEGMENTED NEUTROPHILS ABSOLUTE COUNT: 4.8 THOU/MM3 (ref 1.8–7.7)
SODIUM BLD-SCNC: 142 MEQ/L (ref 135–145)
WBC # BLD: 7.4 THOU/MM3 (ref 4.8–10.8)

## 2020-07-13 PROCEDURE — 36415 COLL VENOUS BLD VENIPUNCTURE: CPT

## 2020-07-13 PROCEDURE — 99285 EMERGENCY DEPT VISIT HI MDM: CPT

## 2020-07-13 PROCEDURE — 72128 CT CHEST SPINE W/O DYE: CPT

## 2020-07-13 PROCEDURE — 2709999900 HC NON-CHARGEABLE SUPPLY

## 2020-07-13 PROCEDURE — 72072 X-RAY EXAM THORAC SPINE 3VWS: CPT

## 2020-07-13 PROCEDURE — 2580000003 HC RX 258: Performed by: FAMILY MEDICINE

## 2020-07-13 PROCEDURE — 6360000002 HC RX W HCPCS: Performed by: PHYSICIAN ASSISTANT

## 2020-07-13 PROCEDURE — 6370000000 HC RX 637 (ALT 250 FOR IP): Performed by: PHYSICIAN ASSISTANT

## 2020-07-13 PROCEDURE — 99219 PR INITIAL OBSERVATION CARE/DAY 50 MINUTES: CPT | Performed by: PHYSICIAN ASSISTANT

## 2020-07-13 PROCEDURE — G0378 HOSPITAL OBSERVATION PER HR: HCPCS

## 2020-07-13 PROCEDURE — 96372 THER/PROPH/DIAG INJ SC/IM: CPT

## 2020-07-13 PROCEDURE — 93005 ELECTROCARDIOGRAM TRACING: CPT | Performed by: FAMILY MEDICINE

## 2020-07-13 PROCEDURE — 80048 BASIC METABOLIC PNL TOTAL CA: CPT

## 2020-07-13 PROCEDURE — 85025 COMPLETE CBC W/AUTO DIFF WBC: CPT

## 2020-07-13 PROCEDURE — 93010 ELECTROCARDIOGRAM REPORT: CPT | Performed by: NUCLEAR MEDICINE

## 2020-07-13 PROCEDURE — 71046 X-RAY EXAM CHEST 2 VIEWS: CPT

## 2020-07-13 RX ORDER — ATORVASTATIN CALCIUM 40 MG/1
40 TABLET, FILM COATED ORAL NIGHTLY
Status: DISCONTINUED | OUTPATIENT
Start: 2020-07-13 | End: 2020-07-15 | Stop reason: HOSPADM

## 2020-07-13 RX ORDER — PROMETHAZINE HYDROCHLORIDE 25 MG/1
12.5 TABLET ORAL EVERY 6 HOURS PRN
Status: DISCONTINUED | OUTPATIENT
Start: 2020-07-13 | End: 2020-07-15 | Stop reason: HOSPADM

## 2020-07-13 RX ORDER — DULOXETIN HYDROCHLORIDE 60 MG/1
60 CAPSULE, DELAYED RELEASE ORAL DAILY
Status: DISCONTINUED | OUTPATIENT
Start: 2020-07-13 | End: 2020-07-14

## 2020-07-13 RX ORDER — POLYETHYLENE GLYCOL 3350 17 G/17G
17 POWDER, FOR SOLUTION ORAL DAILY PRN
Status: DISCONTINUED | OUTPATIENT
Start: 2020-07-13 | End: 2020-07-15 | Stop reason: HOSPADM

## 2020-07-13 RX ORDER — RALOXIFENE HYDROCHLORIDE 60 MG/1
1 TABLET, FILM COATED ORAL DAILY
Status: DISCONTINUED | OUTPATIENT
Start: 2020-07-13 | End: 2020-07-13 | Stop reason: CLARIF

## 2020-07-13 RX ORDER — 0.9 % SODIUM CHLORIDE 0.9 %
500 INTRAVENOUS SOLUTION INTRAVENOUS ONCE
Status: COMPLETED | OUTPATIENT
Start: 2020-07-13 | End: 2020-07-13

## 2020-07-13 RX ORDER — POTASSIUM CHLORIDE 20 MEQ/1
20 TABLET, EXTENDED RELEASE ORAL DAILY
Status: DISCONTINUED | OUTPATIENT
Start: 2020-07-13 | End: 2020-07-15 | Stop reason: HOSPADM

## 2020-07-13 RX ORDER — LORAZEPAM 1 MG/1
1 TABLET ORAL EVERY 6 HOURS PRN
Status: DISCONTINUED | OUTPATIENT
Start: 2020-07-13 | End: 2020-07-14

## 2020-07-13 RX ORDER — HALOPERIDOL 5 MG
5 TABLET ORAL 4 TIMES DAILY PRN
Status: DISCONTINUED | OUTPATIENT
Start: 2020-07-13 | End: 2020-07-14

## 2020-07-13 RX ORDER — POLYETHYLENE GLYCOL 3350 17 G/17G
17 POWDER, FOR SOLUTION ORAL DAILY PRN
Status: DISCONTINUED | OUTPATIENT
Start: 2020-07-13 | End: 2020-07-13 | Stop reason: SDUPTHER

## 2020-07-13 RX ORDER — ACETAMINOPHEN 650 MG/1
650 SUPPOSITORY RECTAL EVERY 6 HOURS PRN
Status: DISCONTINUED | OUTPATIENT
Start: 2020-07-13 | End: 2020-07-15 | Stop reason: HOSPADM

## 2020-07-13 RX ORDER — ACETAMINOPHEN 325 MG/1
650 TABLET ORAL EVERY 6 HOURS PRN
Status: DISCONTINUED | OUTPATIENT
Start: 2020-07-13 | End: 2020-07-15 | Stop reason: HOSPADM

## 2020-07-13 RX ORDER — TIZANIDINE 4 MG/1
4 TABLET ORAL 2 TIMES DAILY PRN
Status: DISCONTINUED | OUTPATIENT
Start: 2020-07-13 | End: 2020-07-15 | Stop reason: HOSPADM

## 2020-07-13 RX ORDER — OXYBUTYNIN CHLORIDE 10 MG/1
10 TABLET, EXTENDED RELEASE ORAL DAILY
Status: DISCONTINUED | OUTPATIENT
Start: 2020-07-13 | End: 2020-07-14

## 2020-07-13 RX ORDER — SODIUM CHLORIDE 0.9 % (FLUSH) 0.9 %
10 SYRINGE (ML) INJECTION PRN
Status: DISCONTINUED | OUTPATIENT
Start: 2020-07-13 | End: 2020-07-15 | Stop reason: HOSPADM

## 2020-07-13 RX ORDER — QUETIAPINE FUMARATE 25 MG/1
25 TABLET, FILM COATED ORAL DAILY
Status: DISCONTINUED | OUTPATIENT
Start: 2020-07-13 | End: 2020-07-14

## 2020-07-13 RX ORDER — ONDANSETRON 2 MG/ML
4 INJECTION INTRAMUSCULAR; INTRAVENOUS EVERY 6 HOURS PRN
Status: DISCONTINUED | OUTPATIENT
Start: 2020-07-13 | End: 2020-07-15 | Stop reason: HOSPADM

## 2020-07-13 RX ORDER — SODIUM CHLORIDE 0.9 % (FLUSH) 0.9 %
10 SYRINGE (ML) INJECTION EVERY 12 HOURS SCHEDULED
Status: DISCONTINUED | OUTPATIENT
Start: 2020-07-13 | End: 2020-07-15 | Stop reason: HOSPADM

## 2020-07-13 RX ORDER — ARIPIPRAZOLE 2 MG/1
2 TABLET ORAL DAILY
Status: DISCONTINUED | OUTPATIENT
Start: 2020-07-13 | End: 2020-07-14

## 2020-07-13 RX ORDER — AMLODIPINE BESYLATE 5 MG/1
5 TABLET ORAL DAILY
Status: DISCONTINUED | OUTPATIENT
Start: 2020-07-13 | End: 2020-07-15 | Stop reason: HOSPADM

## 2020-07-13 RX ORDER — TRAZODONE HYDROCHLORIDE 50 MG/1
25 TABLET ORAL NIGHTLY
Status: DISCONTINUED | OUTPATIENT
Start: 2020-07-13 | End: 2020-07-14

## 2020-07-13 RX ORDER — MEMANTINE HYDROCHLORIDE 5 MG/1
5 TABLET ORAL 2 TIMES DAILY
Status: DISCONTINUED | OUTPATIENT
Start: 2020-07-13 | End: 2020-07-15 | Stop reason: HOSPADM

## 2020-07-13 RX ORDER — LEVOTHYROXINE SODIUM 0.03 MG/1
25 TABLET ORAL DAILY
Status: DISCONTINUED | OUTPATIENT
Start: 2020-07-13 | End: 2020-07-15 | Stop reason: HOSPADM

## 2020-07-13 RX ADMIN — MEMANTINE HYDROCHLORIDE 5 MG: 5 TABLET, FILM COATED ORAL at 20:52

## 2020-07-13 RX ADMIN — QUETIAPINE 25 MG: 25 TABLET, FILM COATED ORAL at 16:26

## 2020-07-13 RX ADMIN — DULOXETINE HYDROCHLORIDE 60 MG: 60 CAPSULE, DELAYED RELEASE ORAL at 16:26

## 2020-07-13 RX ADMIN — LEVOTHYROXINE SODIUM 25 MCG: 25 TABLET ORAL at 16:26

## 2020-07-13 RX ADMIN — ARIPIPRAZOLE 2 MG: 2 TABLET ORAL at 16:26

## 2020-07-13 RX ADMIN — TRAZODONE HYDROCHLORIDE 25 MG: 50 TABLET ORAL at 20:52

## 2020-07-13 RX ADMIN — SODIUM CHLORIDE 500 ML: 9 INJECTION, SOLUTION INTRAVENOUS at 09:28

## 2020-07-13 RX ADMIN — OXYBUTYNIN CHLORIDE 10 MG: 10 TABLET, EXTENDED RELEASE ORAL at 16:26

## 2020-07-13 RX ADMIN — ATORVASTATIN CALCIUM 40 MG: 40 TABLET, FILM COATED ORAL at 20:52

## 2020-07-13 RX ADMIN — AMLODIPINE BESYLATE 5 MG: 5 TABLET ORAL at 16:26

## 2020-07-13 RX ADMIN — ENOXAPARIN SODIUM 40 MG: 40 INJECTION SUBCUTANEOUS at 20:52

## 2020-07-13 RX ADMIN — POTASSIUM CHLORIDE 20 MEQ: 1500 TABLET, EXTENDED RELEASE ORAL at 16:26

## 2020-07-13 ASSESSMENT — ENCOUNTER SYMPTOMS
FACIAL SWELLING: 0
COLOR CHANGE: 0
BACK PAIN: 0
ABDOMINAL PAIN: 0
VOMITING: 0
SHORTNESS OF BREATH: 0
NAUSEA: 0

## 2020-07-13 ASSESSMENT — PAIN SCALES - GENERAL: PAINLEVEL_OUTOF10: 0

## 2020-07-13 NOTE — H&P
daughter, patient was up and walking around and able to have a conversation when she last saw her. Patient's daughter states that since she has been in [facility] psych unit, the patient has not been able to have a conversation. Patient's daughter believes that patient is on too many medications. Past Medical History:        Diagnosis Date    Anxiety     Arthritis     Bleeding disorder ([address]     CAD (coronary artery disease)     Stent in 1st diagonal in 2007    Cataract     Celiac disease     Dementia ([address]     Depression     Fibromyalgia     Hearing impairment     HTN (hypertension)     Hyperlipemia     MI (myocardial infarction) ([address] 2003    Osteoporosis     Pernicious anemia     Pneumonia     Thyroid disease     Urinary incontinence     Vitamin B12 deficiency     Vitamin K deficiency        Past Surgical History:        Procedure Laterality Date    APPENDECTOMY  1963    BLADDER REPAIR      x2    CHOLECYSTECTOMY  1963    COLONOSCOPY  2004, 2014, 2015    CORONARY ANGIOPLASTY WITH STENT PLACEMENT  5/2007    HYSTERECTOMY  1964    KNEE ARTHROSCOPY Left 07/19/2016    Dr Chicas Screws HISTORY  5-16-14    Colonoscopy  2. [facility] Hemorrhoidpexy    TONSILLECTOMY  as a child     TOTAL KNEE ARTHROPLASTY      UPPER GASTROINTESTINAL ENDOSCOPY  2004       Home Medications:   No current facility-administered medications on file prior to encounter.       Current Outpatient Medications on File Prior to Encounter   Medication Sig Dispense Refill    DULoxetine (CYMBALTA) 30 MG extended release capsule Take 2 capsules by mouth daily 30 capsule 5    haloperidol (HALDOL) 5 MG tablet Take 5 mg by mouth 4 times daily as needed for Agitation      acetaminophen (TYLENOL) 325 MG tablet 2 tabs every 6 hours prn 2 tablet 5    loperamide (IMODIUM) 2 MG capsule Take 2 mg by mouth 4 times daily as needed      ARIPiprazole (ABILIFY) 2 MG tablet Take 2 mg by mouth daily      memantine ER (NAMENDA XR) 14 MG CP24 extended release capsule Take 14 mg by mouth daily      polyethylene glycol (GLYCOLAX) 17 g packet Take 17 g by mouth daily as needed for Constipation      nystatin (NYSTATIN) 465727 UNIT/GM powder Apply topically 4 times daily Apply topically 4 times daily.  amLODIPine (NORVASC) 5 MG tablet Take 5 mg by mouth daily      tiZANidine (ZANAFLEX) 4 MG tablet Take 4 mg by mouth 2 times daily as needed      LORazepam (ATIVAN) 1 MG tablet Take 1 mg by mouth every 6 hours as needed for Anxiety.  QUEtiapine (SEROQUEL) 25 MG tablet Take 25 mg by mouth daily      raloxifene (EVISTA) 60 MG tablet TAKE 1 TABLET DAILY 90 tablet 3    atorvastatin (LIPITOR) 40 MG tablet TAKE 1 TABLET NIGHTLY 90 tablet 4    potassium chloride (KLOR-CON M) 20 MEQ extended release tablet TAKE 1 TABLET DAILY 90 tablet 4    oxybutynin (DITROPAN-XL) 10 MG extended release tablet Take 1 tablet by mouth daily 90 tablet 3    levothyroxine (SYNTHROID) 25 MCG tablet TAKE 1 TABLET DAILY 90 tablet 3    Handicap Placard MISC by Does not apply route Expires 6/12/2023 1 each 0    vitamin B-12 (CYANOCOBALAMIN) 500 MCG tablet Take 1 tablet by mouth daily 90 tablet 3    traZODone (DESYREL) 100 MG tablet Take 1 tablet by mouth nightly. (Patient taking differently: Take 25 mg by mouth nightly ) 90 tablet 3       Allergies: Iodides; Iodine; Morphine; Motrin [ibuprofen micronized]; and Percocet [oxycodone-acetaminophen]    Social History:    reports that she has never smoked. She has never used smokeless tobacco. She reports that she does not drink alcohol or use drugs. Family History:       Problem Relation Age of Onset    Cancer Mother         liver, lung    Asthma Father        Diet:  DIET GENERAL; No Added Salt (3-4 GM)    Review of systems:     Unable to assess due to mentation.     PHYSICAL EXAM:  BP (!) 150/68   Pulse 63   Temp 97.5 °F (36.4 °C) (Oral)   Resp 16   Wt 140 lb (63.5 kg)   SpO2 98%   BMI 30.30 kg/m² General appearance: Chronically ill-appearing white female, very tearful  HEENT: Normal cephalic, atraumatic without obvious deformity. Pupils equal, round, and reactive to light. Extra ocular muscles intact. Conjunctivae/corneas clear. Neck: Supple, with full range of motion. No jugular venous distention. Trachea midline. Respiratory:  Normal respiratory effort on RA. Breath sounds diminished. Cardiovascular: Regular rate and rhythm with normal S1/S2 without murmurs, rubs or gallops. Abdomen: Soft, non-tender, non-distended with normal bowel sounds. Musculoskeletal:  No clubbing, cyanosis or edema bilaterally. Skin: Skin color, texture, turgor normal.  No rashes or lesions. Neurologic: Unable to assess due to patient not following commands or answering questions. Psychiatric: Would not follow commands or answer questions. Capillary Refill: Brisk,< 3 seconds   Peripheral Pulses: +2 palpable, equal bilaterally     Labs:   Recent Labs     07/13/20  0940   WBC 7.4   HGB 13.2   HCT 40.9        Recent Labs     07/13/20  0940      K 4.5      CO2 22*   BUN 14   CREATININE 0.5   CALCIUM 8.7     Urinalysis:    Lab Results   Component Value Date    NITRU NEGATIVE 07/06/2020    WBCUA 2-4 06/13/2020    BACTERIA NONE SEEN 06/13/2020    RBCUA NONE SEEN 06/13/2020    BLOODU NEGATIVE 07/06/2020    SPECGRAV 1.025 10/21/2019    GLUCOSEU NEGATIVE 07/06/2020       Radiology:   CT THORACIC SPINE WO CONTRAST   Final Result       1. No definite evidence of acute osseous injury of the thoracic spine. 2. Minimal scalloping of the superior endplate of T3 with 87% central height loss of indeterminate chronicity. 3. Chronic compression deformity of T4, stable compared to 2018. 4. Chronic compression deformity of T12, stable compared to prior CT abdomen and pelvis dated 10/21/2019. **This report has been created using voice recognition software.  It may contain minor errors which are inherent in voice recognition technology. **      Final report electronically signed by Dr. Lexie Hussein MD on 7/13/2020 9:31 AM      XR CHEST STANDARD (2 VW)   Final Result   Superior endplate deformity of the T11 vertebral body. **This report has been created using voice recognition software. It may contain minor errors which are inherent in voice recognition technology. **      Final report electronically signed by Dr Kellie Black on 7/13/2020 8:44 AM      XR THORACIC SPINE (3 VIEWS)   Final Result   Superior endplate deformity of the T11 vertebral body. **This report has been created using voice recognition software. It may contain minor errors which are inherent in voice recognition technology. **      Final report electronically signed by Dr Kellie Black on 7/13/2020 8:47 AM        EKG: Sinus Bradycardia. QTc 428.      Electronically signed by PRICILLA Campa on 7/13/2020 at 2:59 PM

## 2020-07-13 NOTE — ED PROVIDER NOTES
[facility]  eMERGENCY dEPARTMENT eNCOUnter          CHIEF COMPLAINT       Chief Complaint   Patient presents with    Fall       Nurses Notes reviewed and I agree except as noted in the HPI. HISTORY OF PRESENT ILLNESS    Mariely Saul is a 68 y.o. female who presents from a fall    Location/Symptom: Anju Brady at the nursing home  Timing/Onset: 7/13/2020  Context/Setting: Chronic dementia  Was moving about at the nursing home and she fell to the floor  Quality: She was found on the floor without any apparent injury  No bruising or bleeding    Duration: Over the last hour  Modifying Factors: EMS was called and they brought her to the hospital to get checked  Severity: 3/10    REVIEW OF SYSTEMS     Review of Systems   Constitutional: Negative for chills, diaphoresis and fever. HENT: Negative for congestion and facial swelling. Eyes: Negative for visual disturbance. Respiratory: Negative for shortness of breath. Cardiovascular: Negative for chest pain. Gastrointestinal: Negative for abdominal pain, nausea and vomiting. Genitourinary: Negative for flank pain. Musculoskeletal: Negative for back pain and neck pain. Skin: Negative for color change, rash and wound. Neurological: Negative for headaches. Hematological: Negative for adenopathy. Does not bruise/bleed easily. Psychiatric/Behavioral:        Chronic stable dementia    She had spent 2 weeks at [facility] psychiatry in [address] [place] [address] followed by 3weeks a psychiatric facility in [place] [address]. She got out of the psychiatric facility about a week ago and transferred to [facility] in [address] [facility] in a memory unit. She fell about a week ago was seen in the ER    She is fallen again today    Her daughter indicates since she has been out of the psychiatric unit she has been more frail and has fallen multiple times since she suspects polypharmacy is contributing.             PAST MEDICAL HISTORY    has a past medical history of Anxiety, Arthritis, POTASSIUM CHLORIDE (KLOR-CON M) 20 MEQ EXTENDED RELEASE TABLET    TAKE 1 TABLET DAILY    QUETIAPINE (SEROQUEL) 25 MG TABLET    Take 25 mg by mouth daily    RALOXIFENE (EVISTA) 60 MG TABLET    TAKE 1 TABLET DAILY    TIZANIDINE (ZANAFLEX) 4 MG TABLET    Take 4 mg by mouth 2 times daily as needed    TRAZODONE (DESYREL) 100 MG TABLET    Take 1 tablet by mouth nightly. VITAMIN B-12 (CYANOCOBALAMIN) 500 MCG TABLET    Take 1 tablet by mouth daily       ALLERGIES     is allergic to iodides; iodine; morphine; motrin [ibuprofen micronized]; and percocet [oxycodone-acetaminophen]. FAMILY HISTORY     She indicated that her mother is . She indicated that her father is . She indicated that her maternal grandmother is . She indicated that her maternal grandfather is . She indicated that her paternal grandmother is . She indicated that her paternal grandfather is . family history includes Asthma in her father; Cancer in her mother. SOCIAL HISTORY      reports that she has never smoked. She has never used smokeless tobacco. She reports that she does not drink alcohol or use drugs. PHYSICAL EXAM     INITIAL VITALS:  weight is 140 lb (63.5 kg). Her oral temperature is 97.2 °F (36.2 °C). Her blood pressure is 134/71 and her pulse is 69. Her respiration is 13 and oxygen saturation is 99%. Physical Exam  Vitals signs and nursing note reviewed. Constitutional:       Comments: GCS 14    Keeps her eyes closed    Opens her eyes to voice   HENT:      Head: Normocephalic and atraumatic. Comments: No tenderness over the scalp or evidence of injury    Face is nontender with no swelling or bruising    Nose is clear    Mouth lips teeth normal  Eyes:      General: No scleral icterus. Extraocular Movements: Extraocular movements intact. Pupils: Pupils are equal, round, and reactive to light. Neck:      Musculoskeletal: Normal range of motion and neck supple.  No muscular tenderness. Comments: Trachea midline    No tenderness of the neck or back of the neck      Cardiovascular:      Rate and Rhythm: Normal rate and regular rhythm. Pulses: Normal pulses. Heart sounds: Normal heart sounds. Pulmonary:      Effort: Pulmonary effort is normal.      Breath sounds: Normal breath sounds. Comments: Nontender over the sternum, clavicles, ribs  Chest:      Chest wall: No tenderness. Abdominal:      General: There is no distension. Palpations: Abdomen is soft. Tenderness: There is no abdominal tenderness. There is no guarding or rebound. Musculoskeletal:      Comments: No tenderness of the arms or legs    Mild tenderness over the thoracic spine diffusely    Nontender over the lumbar spine   Skin:     General: Skin is warm and dry. Findings: No bruising. Neurological:      General: No focal deficit present. Mental Status: She is alert. Motor: No weakness. Psychiatric:      Comments: Patient answers questions    Is alert    Memory is poor           DIFFERENTIAL DIAGNOSIS:     Fall with some thoracic tenderness posteriorly    No sign of head injury neck injury, extremity injury    DIAGNOSTIC RESULTS     EKG: All EKG's are interpreted by the Emergency Department Physician who either signs or Co-signs this chart in the absence of a cardiologist.    EKG showed sinus rhythm with rate 57. QRS complexes show -18 degree axis, normal conduction. ST-T waves show no acute change        RADIOLOGY: non-plain film images(s) such as CT, Ultrasound and MRI are read by the radiologist.  The patient had a 2 view X-ray of the chest which demonstrates what appears to be compression deformity of thoracic vertebrae around #8.   Lungs otherwise clear per my interpretation    Thoracic spine x-ray, 3 view show suspicious T8 compression fracture per my interpretation    CT scan of the thoracic spine revealed evidence of compression fractures but they were felt to be old and are compared to prior CTs and felt to be similar. [x] Visualized and interpreted by me   [x] Radiologist's Wet Read Report Reviewed   [] Discussed with Radiologist.    LABS:   Labs Reviewed   BASIC METABOLIC PANEL - Abnormal; Notable for the following components:       Result Value    CO2 22 (*)     All other components within normal limits   CBC WITH AUTO DIFFERENTIAL - Abnormal; Notable for the following components:    RBC 3.93 (*)     .1 (*)     MCH 33.6 (*)     RDW-SD 49.3 (*)     All other components within normal limits   ANION GAP   GLOMERULAR FILTRATION RATE, ESTIMATED   OSMOLALITY       EMERGENCY DEPARTMENT COURSE:   Vitals:    Vitals:    07/13/20 0750 07/13/20 1031 07/13/20 1135   BP: (!) 139/57  134/71   Pulse: 57 64 69   Resp: 22 20 13   Temp: 97.2 °F (36.2 °C)     TempSrc: Oral     SpO2: 100%  99%   Weight: 140 lb (63.5 kg)       Nursing notes reviewed    Normal CBC    Renal function, electrolytes, blood sugar normal    Talk with the daughter who indicates over the last week she is fallen twice  She has been started on a lot of new medicines at  Geriatric psych admission recently    I did talk with trauma services who are comfortable with medical admission for the patient    Recommend admission to review her polypharmacy weakness, and multiple falls        CRITICAL CARE:   none    CONSULTS:  Dr. Waqar Mary PAC    PROCEDURES:  None    FINAL IMPRESSION      1. Falls frequently    2. General weakness    3.  Alzheimer's disease of other onset without behavioral disturbance [facility])          DISPOSITION/PLAN   Admit      PATIENT REFERRED TO:  CATRINA Pugh - CNP  [address]  [facility] [address]  [phone]            DISCHARGE MEDICATIONS:  New Prescriptions    No medications on file       (Please note that portions of this note were completed with a voice recognition program.  Efforts were made to edit the dictations but occasionally words are

## 2020-07-13 NOTE — ED NOTES
Pt medicated per [facility] and updated on plan of care. Pt resting in bed with eyes closed, respirations easy and unlabored. Pt responsive to verbal stimuli.      Ellen Colby RN  07/13/20 3283

## 2020-07-14 PROBLEM — E43 SEVERE MALNUTRITION (HCC): Status: ACTIVE | Noted: 2020-07-14

## 2020-07-14 LAB
ANION GAP SERPL CALCULATED.3IONS-SCNC: 10 MEQ/L (ref 8–16)
BUN BLDV-MCNC: 10 MG/DL (ref 7–22)
CALCIUM SERPL-MCNC: 8.3 MG/DL (ref 8.5–10.5)
CHLORIDE BLD-SCNC: 107 MEQ/L (ref 98–111)
CO2: 21 MEQ/L (ref 23–33)
CREAT SERPL-MCNC: 0.4 MG/DL (ref 0.4–1.2)
ERYTHROCYTE [DISTWIDTH] IN BLOOD BY AUTOMATED COUNT: 12.8 % (ref 11.5–14.5)
ERYTHROCYTE [DISTWIDTH] IN BLOOD BY AUTOMATED COUNT: 50.1 FL (ref 35–45)
GFR SERPL CREATININE-BSD FRML MDRD: > 90 ML/MIN/1.73M2
GLUCOSE BLD-MCNC: 84 MG/DL (ref 70–108)
HCT VFR BLD CALC: 37.5 % (ref 37–47)
HEMOGLOBIN: 11.9 GM/DL (ref 12–16)
MCH RBC QN AUTO: 33.4 PG (ref 26–33)
MCHC RBC AUTO-ENTMCNC: 31.7 GM/DL (ref 32.2–35.5)
MCV RBC AUTO: 105.3 FL (ref 81–99)
PLATELET # BLD: 247 THOU/MM3 (ref 130–400)
PMV BLD AUTO: 11 FL (ref 9.4–12.4)
POTASSIUM REFLEX MAGNESIUM: 4 MEQ/L (ref 3.5–5.2)
RBC # BLD: 3.56 MILL/MM3 (ref 4.2–5.4)
SODIUM BLD-SCNC: 138 MEQ/L (ref 135–145)
WBC # BLD: 6.9 THOU/MM3 (ref 4.8–10.8)

## 2020-07-14 PROCEDURE — 97166 OT EVAL MOD COMPLEX 45 MIN: CPT

## 2020-07-14 PROCEDURE — 99225 PR SBSQ OBSERVATION CARE/DAY 25 MINUTES: CPT | Performed by: INTERNAL MEDICINE

## 2020-07-14 PROCEDURE — 96372 THER/PROPH/DIAG INJ SC/IM: CPT

## 2020-07-14 PROCEDURE — 97530 THERAPEUTIC ACTIVITIES: CPT

## 2020-07-14 PROCEDURE — 97116 GAIT TRAINING THERAPY: CPT

## 2020-07-14 PROCEDURE — 2580000003 HC RX 258: Performed by: PHYSICIAN ASSISTANT

## 2020-07-14 PROCEDURE — 97163 PT EVAL HIGH COMPLEX 45 MIN: CPT

## 2020-07-14 PROCEDURE — 36415 COLL VENOUS BLD VENIPUNCTURE: CPT

## 2020-07-14 PROCEDURE — 85027 COMPLETE CBC AUTOMATED: CPT

## 2020-07-14 PROCEDURE — 6360000002 HC RX W HCPCS: Performed by: PHYSICIAN ASSISTANT

## 2020-07-14 PROCEDURE — G0378 HOSPITAL OBSERVATION PER HR: HCPCS

## 2020-07-14 PROCEDURE — 97535 SELF CARE MNGMENT TRAINING: CPT

## 2020-07-14 PROCEDURE — 6370000000 HC RX 637 (ALT 250 FOR IP): Performed by: PHYSICIAN ASSISTANT

## 2020-07-14 PROCEDURE — 90792 PSYCH DIAG EVAL W/MED SRVCS: CPT | Performed by: PSYCHIATRY & NEUROLOGY

## 2020-07-14 PROCEDURE — 80048 BASIC METABOLIC PNL TOTAL CA: CPT

## 2020-07-14 RX ORDER — HALOPERIDOL 5 MG/ML
2 INJECTION INTRAMUSCULAR EVERY 6 HOURS PRN
Status: DISCONTINUED | OUTPATIENT
Start: 2020-07-14 | End: 2020-07-15 | Stop reason: HOSPADM

## 2020-07-14 RX ORDER — DULOXETIN HYDROCHLORIDE 30 MG/1
30 CAPSULE, DELAYED RELEASE ORAL DAILY
Status: DISCONTINUED | OUTPATIENT
Start: 2020-07-15 | End: 2020-07-15 | Stop reason: HOSPADM

## 2020-07-14 RX ORDER — QUETIAPINE FUMARATE 25 MG/1
25 TABLET, FILM COATED ORAL 2 TIMES DAILY PRN
Status: DISCONTINUED | OUTPATIENT
Start: 2020-07-14 | End: 2020-07-15 | Stop reason: HOSPADM

## 2020-07-14 RX ORDER — TRAZODONE HYDROCHLORIDE 50 MG/1
25 TABLET ORAL NIGHTLY PRN
Status: DISCONTINUED | OUTPATIENT
Start: 2020-07-14 | End: 2020-07-15 | Stop reason: HOSPADM

## 2020-07-14 RX ADMIN — ATORVASTATIN CALCIUM 40 MG: 40 TABLET, FILM COATED ORAL at 21:01

## 2020-07-14 RX ADMIN — AMLODIPINE BESYLATE 5 MG: 5 TABLET ORAL at 08:45

## 2020-07-14 RX ADMIN — MEMANTINE HYDROCHLORIDE 5 MG: 5 TABLET, FILM COATED ORAL at 21:00

## 2020-07-14 RX ADMIN — ENOXAPARIN SODIUM 40 MG: 40 INJECTION SUBCUTANEOUS at 20:58

## 2020-07-14 RX ADMIN — POTASSIUM CHLORIDE 20 MEQ: 1500 TABLET, EXTENDED RELEASE ORAL at 08:48

## 2020-07-14 RX ADMIN — QUETIAPINE 25 MG: 25 TABLET, FILM COATED ORAL at 08:48

## 2020-07-14 RX ADMIN — OXYBUTYNIN CHLORIDE 10 MG: 10 TABLET, EXTENDED RELEASE ORAL at 08:47

## 2020-07-14 RX ADMIN — MEMANTINE HYDROCHLORIDE 5 MG: 5 TABLET, FILM COATED ORAL at 12:05

## 2020-07-14 RX ADMIN — LEVOTHYROXINE SODIUM 25 MCG: 25 TABLET ORAL at 08:45

## 2020-07-14 NOTE — CARE COORDINATION
DISCHARGE/PLANNING EVALUATION  7/14/20, 2:46 PM EDT    Reason for Referral: discharge plan  Mental Status: patient has dementia  Decision Making: daughter, son and daughter in law are POA's  Family/Social/Home Environment: Spoke with daughter in law Marley who shared with SW recent placement and psych history of patient. She was admitted this time from [place] [address] after experiencing a fall (she has been at [facility] for a little over a week having 2 visits to the ER due to falls). Daughter in law states that family is concerned that patient is over medicated and is not at her baseline as a result. In addition to this, visitation restrictions at facility as a result of current pandemic have only added to decline in patient behavior. She shared that patient has been evaluated by psychiatry and plan is to discontinue all medications in order to assess her baseline mentation. Current Services including food security, transportation and housekeeping: managed by staff at [facility]. Current Equipment: N/A  Payment Source: Medicare with secondary  Concerns or Barriers to Discharge: progression of patient dementia and increase in care needs as a result. Post acute provider list with quality measures, geographic area and applicable managed care information provided. Questions regarding selection process answered: N/A    Teach Back Method used with daughter in law, Cite Armand Peguero regarding care plan   Daughter in law, Cite Armand Peguero verbalizes understanding of the plan of care and contribute to goal setting. Patient goals, treatment preferences and discharge plan: Cite Armand Peguero states that plan is for patient to return to [facility] at discharge. Family will remain in regular contact with staff re: patient care needs and will work with them on developing an alternate care plan should patient's needs become more than what can be managed in AL setting.      Electronically signed by WANG Carpenter on 7/14/2020 at 2:46 PM

## 2020-07-14 NOTE — PLAN OF CARE
Problem: Falls - Risk of:  Goal: Will remain free from falls  Description: Will remain free from falls  Outcome: Ongoing  Note: Patient free from falls this shift. Patient uses call light appropriately, bed in lowest position, nonskid socks on, bed rails up x2, fall sign posted and call light in reach. Patient at risk due to confusion and history of falls. Sitter at bedside, patient is weak and impulsive at times. Continuing to monitor. Problem: Confusion - Acute:  Goal: Absence of continued neurological deterioration signs and symptoms  Description: Absence of continued neurological deterioration signs and symptoms  Outcome: Ongoing  Note: Patient is alert to person and place only. Problem: Discharge Planning:  Goal: Ability to perform activities of daily living will improve  Description: Ability to perform activities of daily living will improve  Outcome: Ongoing  Note: Discharge plans to return to [address] [place] [address] discussed with patient. Problem: Skin Integrity:  Goal: Will show no infection signs and symptoms  Description: Will show no infection signs and symptoms  Outcome: Ongoing  Note: No sign or symptoms of infection this shift. Care plan reviewed with patient and family. Patient and family verbalize understanding of the plan of care and contribute to goal setting.

## 2020-07-14 NOTE — CONSULTS
traozodone 50 mg in March prior to these admissions. DIL is happy with current care at [facility] where Dr. Evelin Watson will be able to manage patient, but continues to be concerned about polypharmacy and increased falls during this admission    PSYCHIATRIC HISTORY:      · Outpatient psychiatric provider:  Dr. Raza Lujan   · Suicide attempts: 1  · Inpatient psychiatric admissions: 2    Past psychiatric medications includes:     Trazodone, remeron, lexapro  Adverse reactions from psychotropic medications:    Denies      Lifetime Psychiatric Review of Systems      ·    Obsessions and Compulsions: Denies    ·    Ciera or Hypomania: Denies  ·    Hallucinations: Denies  ·    Panic Attacks:  Denies  ·    Delusions:  Denies  ·    Phobias:  Denies  ·    Trauma: Denies    Prior to Admission medications    Medication Sig Start Date End Date Taking? Authorizing Provider   DULoxetine (CYMBALTA) 30 MG extended release capsule Take 2 capsules by mouth daily 7/7/20  Yes CATRINA Kelly CNP   loperamide (IMODIUM) 2 MG capsule Take 2 mg by mouth 4 times daily as needed   Yes Historical Provider, MD   ARIPiprazole (ABILIFY) 2 MG tablet Take 2 mg by mouth daily   Yes Historical Provider, MD   memantine ER (NAMENDA XR) 14 MG CP24 extended release capsule Take 14 mg by mouth daily   Yes Historical Provider, MD   amLODIPine (NORVASC) 5 MG tablet Take 5 mg by mouth daily   Yes Historical Provider, MD   LORazepam (ATIVAN) 1 MG tablet Take 1 mg by mouth every 6 hours as needed for Anxiety.    Yes Historical Provider, MD   QUEtiapine (SEROQUEL) 25 MG tablet Take 25 mg by mouth daily   Yes Historical Provider, MD   raloxifene (EVISTA) 60 MG tablet TAKE 1 TABLET DAILY 3/12/20  Yes Josue Evans, DO   atorvastatin (LIPITOR) 40 MG tablet TAKE 1 TABLET NIGHTLY 12/30/19  Yes Josue Older, DO   oxybutynin (DITROPAN-XL) 10 MG extended release tablet Take 1 tablet by mouth daily 10/22/19  Yes Josue Older, DO   levothyroxine (SYNTHROID) 25 MCG tablet TAKE 1 TABLET DAILY 12/3/18  Yes Aroldo Gregorio DO   traZODone (DESYREL) 100 MG tablet Take 1 tablet by mouth nightly. Patient taking differently: Take 25 mg by mouth nightly  1/20/15  Yes Aroldo Gregorio DO   acetaminophen (TYLENOL) 325 MG tablet 2 tabs every 6 hours prn 7/7/20   CATRINA Yanez CNP   polyethylene glycol (GLYCOLAX) 17 g packet Take 17 g by mouth daily as needed for Constipation    Historical Provider, MD   nystatin (NYSTATIN) 789651 UNIT/GM powder Apply topically 4 times daily Apply topically 4 times daily.     Historical Provider, MD   haloperidol (HALDOL) 5 MG tablet Take 5 mg by mouth 4 times daily as needed for Agitation    Historical Provider, MD   tiZANidine (ZANAFLEX) 4 MG tablet Take 4 mg by mouth 2 times daily as needed    Historical Provider, MD   potassium chloride (KLOR-CON M) 20 MEQ extended release tablet TAKE 1 TABLET DAILY 12/6/19   Aroldo Gregorio DO   Handicap Placard MISC by Does not apply route Expires 6/12/2023 6/12/18   Aroldo Gregorio DO   vitamin B-12 (CYANOCOBALAMIN) 500 MCG tablet Take 1 tablet by mouth daily 12/5/17 12/5/18  Lydia Ariza MD        Medications:    Current Facility-Administered Medications: amLODIPine (NORVASC) tablet 5 mg, 5 mg, Oral, Daily  ARIPiprazole (ABILIFY) tablet 2 mg, 2 mg, Oral, Daily  atorvastatin (LIPITOR) tablet 40 mg, 40 mg, Oral, Nightly  DULoxetine (CYMBALTA) extended release capsule 60 mg, 60 mg, Oral, Daily  traZODone (DESYREL) tablet 25 mg, 25 mg, Oral, Nightly  tiZANidine (ZANAFLEX) tablet 4 mg, 4 mg, Oral, BID PRN  QUEtiapine (SEROQUEL) tablet 25 mg, 25 mg, Oral, Daily  potassium chloride (KLOR-CON M) extended release tablet 20 mEq, 20 mEq, Oral, Daily  polyethylene glycol (GLYCOLAX) packet 17 g, 17 g, Oral, Daily PRN  oxybutynin (DITROPAN-XL) extended release tablet 10 mg, 10 mg, Oral, Daily  memantine (NAMENDA) tablet 5 mg, 5 mg, Oral, BID  LORazepam (ATIVAN) tablet 1 mg, 1 mg, Oral, Q6H PRN  levothyroxine (SYNTHROID) Onset    Cancer Mother         liver, lung    Asthma Father          Physical  BP (!) 100/53   Pulse 71   Temp 97.6 °F (36.4 °C) (Axillary)   Resp 16   Wt 140 lb (63.5 kg)   SpO2 90%   BMI 30.30 kg/m²       Mental Status Examination:  Level of consciousness:  Within normal limits  Appearance: hospital attire, lying in bed, fair grooming, somnolent  Behavior/Motor:  no abnormalities noted  Attitude toward examiner:  Cooperative and good eye contact with prompting from daughter-in-law. Does not speak to examiner. Speech:  Spontaneous, slow rate and soft volume  Mood:  sleepy  Affect: mood congruent  Thought processes:  loose  Thought content: denies suicidal ideations   denies homicidal ideations    denies hallucinations   denies delusions  Cognition:  Not oriented  Concentration poor  Memory poor   Insight & Judgment:  Poor     DSM-5 DIAGNOSIS:      Dementia with behavioral disturbances     Stressors     Source of stressors include:   passing away, frequent changing of nursing homes, recent falls, recent changes in medication, inability to see family due to nursing home restrictions    PLAN:      1. Dementia  - rapid progression of cognitive decline since March:  Patient's  passing away earlier this year was a huge stressor and could certainly be the source of her decline, however, we will give her a drug holiday to see if she has any improvement  - stop scheduled seroquel, trazodone, ativan, oxybutynin, abilify  - PRN:  Trazodone 25 mg PO QD nightly for insomnia, Seroquel 25 mg PO BID for agitation, Haloperidol 2 mg IM Q6 for backup for agitation  - decrease duloxetine to 30 mg PO QD    2. Agitation/Sundowning  - PT/OT to see patient in evenings, even just to walk around room. Patient has a history of self-isolating in the evenings, which causes her sundowning to worsen. Encourage nighttime activity to help lower energy levels before bed.    - see PRNs above for agitation --> try seroquel before haloperidol      Additional recommendations will follow the clinical course. Thank you very much for allowing us to participate in the care of this patient. Electronically signed by Zara Joyce DO on 7/14/20 at 7:49 AM EDT                                   Psychiatry Attending Attestation     I assessed this patient and reviewed the case and plan of care with Dr Yumiko Santos. I have reviewed the above documentation and I agree with the findings and treatment plan with the following updates. Patient is a 79-year-old   female with history of vascular dementia, for last 6 years, presented for recurrent falls. Patient is not oriented to time place person or situation. She appears very somnolent this morning. Much of the history was provided by her daughter-in-law. Patient's daughter-in-law mentioned that she had multiple inpatient psychiatric hospitalization followed by placement at the nursing home (dementia care unit). Daughter-in-law mentioned that patient has severe sundowning with agitation at times. She notes that her behaviors have worsened since family were unable to visit her in a nursing facility since COVID-19 hit. Patient had recurrent suicidal thoughts with some suicidal gestures is wanting to hang herself in the past leading to inpatient [facility] psych admissions. Patient's family is very concerned that she is very somnolent on the current dose of medications and they are worried about polypharmacy. Discussed with the family about discontinuing all her psychotropic medications along with oxybutynin and only having PRN Seroquel and Haldol for any severe agitation to get an assessment on her baseline mentation. Family agreed to the plan. We will continue to follow. Alvino Montesinos is a 68 y.o. female being evaluated by a Virtual Visit (video visit) encounter to address concerns as mentioned above. A caregiver was present in the room along with the patient. Pursuant to the emergency declaration under the [address] [facility] authority and the [employer] Act, this Virtual Visit was conducted with patient's (and/or legal guardian's) consent, to reduce the patient's risk of exposure to COVID-19 and provide necessary medical care. Services were provided through a video synchronous discussion virtually to substitute for in-person visit by provider. Patient is present at [address] [place] [address] on [address] and I am physically present at my home in [facility]     --Constance Pederson MD on 7/14/2020 at 7:58 PM    An electronic signature was used to authenticate this note. **This report has been created using voice recognition software. It may contain minor errors which are inherent in voice recognition technology. **

## 2020-07-14 NOTE — CARE COORDINATION
7/14/20, 12:27 PM EDT  DISCHARGE PLANNING EVALUATION:    Kieran Nance       Admitted from: ER, patient presented after falling at [facility] 5. 7/13/2020/ [address] day: 0   Location: [id number]-03/694-F Reason for admit: Multiple falls [R29.6]  Multiple falls [R29.6] Status: Obs. Admit order signed?: yes  PMH:  has a past medical history of Anxiety, Arthritis, Bleeding disorder ([address] CAD (coronary artery disease), Cataract, Celiac disease, Dementia ([address] Depression, Fibromyalgia, Hearing impairment, HTN (hypertension), Hyperlipemia, MI (myocardial infarction) ([address] Osteoporosis, Pernicious anemia, Pneumonia, Thyroid disease, Urinary incontinence, Vitamin B12 deficiency, and Vitamin K deficiency. Procedure: N/A  Pertinent abnormal Imaging:  Chest x-ray: Superior endplate deformity of the T11 vertebral body. CT of thoracic spine:   1. No definite evidence of acute osseous injury of the thoracic spine. 2. Minimal scalloping of the superior endplate of T3 with 71% central height loss of indeterminate chronicity. 3. Chronic compression deformity of T4, stable compared to 2018. 4. Chronic compression deformity of T12, stable compared to prior CT abdomen and pelvis dated 10/21/2019       Medications:  Scheduled Meds:   [START ON 7/15/2020] DULoxetine  30 mg Oral Daily    amLODIPine  5 mg Oral Daily    atorvastatin  40 mg Oral Nightly    potassium chloride  20 mEq Oral Daily    memantine  5 mg Oral BID    levothyroxine  25 mcg Oral Daily    sodium chloride flush  10 mL Intravenous 2 times per day    enoxaparin  40 mg Subcutaneous Q24H     Continuous Infusions:   Pertinent Info/Orders/Treatment Plan:  Psychiatry consult, PT/OT, SS, Dietician, Lovenox, psychotropic medications on hold (other than Cymbalta and Namenda), prn Tylenol, Haldol, Glycolax, Phenergan, Zofran, Seroquel, Desyrel, and Zanaflex, DNRCC, up with assistance. Bedside sitter and telesitter removed from room. Diet: DIET GENERAL; No Added Salt (3-4 GM)  Dietary Nutrition Supplements: Standard High Calorie Oral Supplement  Dietary Nutrition Supplements: Frozen Oral Supplement   Smoking status:  reports that she has never smoked. She has never used smokeless tobacco.   PCP: CATRINA Kelly CNP  Readmission 30 days or less: No   %    Discharge Planning Evaluation  Current Residence:  Nursing Home  Living Arrangements:  Other (Comment)(ECF)   Support Systems:  Family Members, Children  Current Services PTA:     Potential Assistance Needed:  [address] Medications:  No  Does patient want to participate in local refill/ meds to beds program?  No  Type of Home Care Services:  None  Patient expects to be discharged to:  [facility]  Expected Discharge date:  07/15/20  Follow Up Appointment: Best Day/ Time: Tuesday AM    Patient Goals/Plan/Treatment Preferences: Diana Meredith is a resident of [place] [address]. Plan on her return at discharge. Transportation/Food Security/Housekeeping Addressed:  No issues identified.     Evaluation: yes

## 2020-07-14 NOTE — PROGRESS NOTES
[facility]  INPATIENT PHYSICAL THERAPY  EVALUATION  [facility] ONC MED 5K - [id number]    Time In: 5967  Time Out: 1440  Timed Code Treatment Minutes: 8 Minutes  Minutes: 20          Date: 2020  Patient Name: Lorna Carrillo,  Gender:  female        MRN: [id number]  : 1940  (68 y.o.)      Referring Practitioner: PRICILLA Gurrola  Diagnosis: multiple falls  Additional Pertinent Hx: Per H&P: chronically ill 81-year-old female who presented to [facility] emergency department due to multiple falls. Patient has a significant psych history, patient was recently in a [facility] 135 unit for a total of 5 weeks in [address] [place] [address]. Patient has had multiple bouts of suicidal ideation and was found acting up on with night gown wrapped around her neck. Patient was just recently sent to [facility], memory unit. Patient has had multiple falls since she has came to nursing home. Per patient's daughter, patient was up and walking around and able to have a conversation when she last saw her. Patient's daughter states that since she has been in [place] psych unit, the patient has not been able to have a conversation.   Patient's daughter believes that patient is on too many medications     Restrictions/Precautions:  Restrictions/Precautions: General Precautions, Fall Risk  Position Activity Restriction  Other position/activity restrictions: live sitter    Subjective:  Chart Reviewed: Yes  Patient assessed for rehabilitation services?: Yes  Subjective: pleasantly confused, no recall with short term memory, live sitter present throughout session    General:    Hearing: Within functional limits         Pain:  Per pt min back ache    Social/Functional History:    Lives With: Alone  Type of Home: Facility([facility] memory unit x1 week)             ADL Assistance: Independent(IND with all with exception of bathing in March; general decline since)  Homemaking Assistance: (dependent on staff)  Ambulation Assistance: Independent  Transfer Assistance: Independent          Additional Comments: Per pt's daughter in law, significant decline since March, at that time, pt was independent with all care, with the exception of assist during showers, pt was living in [facility]. Pt normally ambulates wtihout AD, goes to dining room and is very involved in [id number] OBJECTIVE:  Range of Motion:  Bilateral Lower Extremity: WFL    Strength:  Bilateral Lower Extremity: WFL    Balance:  Static Sitting Balance:  Modified Independent  Dynamic Sitting Balance: Supervision  Static Standing Balance: Stand By Assistance  Dynamic Standing Balance: Contact Guard Assistance  Cues for direction and safety, pt reaching shoulder to waist level in sitting and standing dyn activity  Bed Mobility:  Not Tested    Transfers:  Sit to Stand: Contact Guard Assistance  Stand to Sit:Contact Guard Assistance  Multiple cues for where toilet and sofa were to sit down safely  Ambulation:  Contact Guard Assistance, to SBA  Distance: 40'x1, 10'x1  Surface: Level Tile  Device:No Device  Gait Deviations:  NBOS, multiple cues for direction for safety, no LOB but unsteady at times with pt impulsive and unsure of direction        Functional Outcome Measures: Completed  AM-PAC Inpatient Mobility Raw Score : 18  AM-PAC Inpatient T-Scale Score : 43.63    ASSESSMENT:  Activity Tolerance:  Patient tolerance of  treatment: good. Treatment Initiated: Treatment and education initiated within context of evaluation. Evaluation time included review of current medical information, gathering information related to past medical, social and functional history, completion of standardized testing, formal and informal observation of tasks, assessment of data and development of plan of care and goals.   Treatment time included skilled education and facilitation of tasks to increase safety and independence with functional mobility for improved independence and quality of life. Assessment:  Assessment: pt SBA to CGA for safe mobility due to dec cognition and safety awareness, pt required a lot of redirection to task, pt with live sitter, no further PT goals identified    REQUIRES PT FOLLOW UP: No  No Skilled PT: (no carryover for education/instruction with pt)    Discharge Recommendations:  Discharge Recommendations: 24 hour supervision or assist    Patient Education:  PT Education: Goals, PT Role, Plan of Care, Functional Mobility Training    Equipment Recommendations:  Equipment Needed: No    Plan:  Times per week: NA    Goals:  Patient goals : not stated  Short term goals  Time Frame for Short term goals: NA  Long term goals  Time Frame for Long term goals : NA    Following session, patient left in safe position with all fall risk precautions in place.

## 2020-07-14 NOTE — PLAN OF CARE
Problem: Falls - Risk of:  Goal: Will remain free from falls  Description: Will remain free from falls  7/14/2020 1112 by Amor Martinez RN  Outcome: Ongoing  Note: Bed in lowest position. Call light within reach. Educated patient to use call light for assistance. Bed alarm in place. Problem: Confusion - Acute:  Goal: Absence of continued neurological deterioration signs and symptoms  Description: Absence of continued neurological deterioration signs and symptoms  7/14/2020 1112 by Amor Martinez RN  Outcome: Ongoing  Note: Continues to be confused. Alert to name only at this time. Problem: Discharge Planning:  Goal: Ability to perform activities of daily living will improve  Description: Ability to perform activities of daily living will improve  7/14/2020 1112 by Amor Martinez RN  Outcome: Ongoing  Note: Plans to return to [address] [place] [address]  Will continue to monitor for discharge barriers. Problem: Injury - Risk of, Physical Injury:  Goal: Will remain free from falls  Description: Will remain free from falls  7/14/2020 1112 by Amor Martinez RN  Outcome: Ongoing  Note: Bed in lowest position. Call light within reach. Educated patient to use call light for assistance. Bed alarm in place. Problem: Mood - Altered:  Goal: Mood stable  Description: Mood stable  7/14/2020 1112 by Amor Martinez RN  Outcome: Ongoing  Note: Patient calm, cooperative this shift. Sitter at bedside. Problem: Skin Integrity:  Goal: Will show no infection signs and symptoms  Description: Will show no infection signs and symptoms  7/14/2020 1112 by Amor Martinez RN  Outcome: Ongoing  Note: Skin assessment completed. Patient turned every 2 hours and as needed. No skin breakdown this shift. Care plan reviewed with patient and family. Patient and family verbalize understanding of the plan of care and contribute to goal setting.

## 2020-07-14 NOTE — PROGRESS NOTES
Home: Facility([facility] memory unit x1 week)           ADL Assistance: Independent(IND with all with exception of bathing in March; general decline since)  Homemaking Assistance: (dependent on staff)  Ambulation Assistance: Independent  Transfer Assistance: Independent          Additional Comments: Per pt's daughter in law, significant decline since March, at that time, pt was independent with all care, with the exception of assist during showers, pt was living in [facility]. Pt normally ambulates wtihout AD, goes to dining room and is very involved in [id number].    VISION:WFL    HEARING:  WFL    COGNITION: Decreased Recall, Decreased Insight, Impaired Memory, Inattention, Decreased Problem Solving, Decreased Safety Awareness, Difficulty Following Commands, Impulsive, Tangential and disoriented    RANGE OF MOTION:  Bilateral Upper Extremity:  WFL    ADL:   Feeding: Supervision, with set-up, with verbal cues  and with increased time for completion. eating lunch with constant cues for attention to task and task progression  Lower Extremity Dressing: Stand By Assistance. adjusting socks. BALANCE:  Sitting Balance:  Supervision. Standing Balance: Contact Guard Assistance, Minimal Assistance. moments of LOB requiring hands on assit    BED MOBILITY:  Not Tested    TRANSFERS:  Sit to Stand:  Contact Guard Assistance. couch  Stand to Sit: Minimal Assistance. to guide descent onto couch, pt attempting to sit on armrest    FUNCTIONAL MOBILITY:  Assistive Device: None  Assist Level:  Contact Guard Assistance and Moderate Assistance. Distance: moderate distances on nursing unit  Pt unsteady, primarily CGA however occasionally scissoring steps with moments of decline to Mod A requiring OT assist to prevent fall; very slow pace       Activity Tolerance:  Patient tolerance of  treatment: fair.         Assessment:  Assessment: Pt would continue to benefit from skilled OT intervention to maximize pt safety and independence with performing self care tasks and functional mobility and to ensure safe transition to the next level of care and return to OF. Performance deficits / Impairments: Decreased functional mobility , Decreased ADL status, Decreased balance, Decreased cognition, Decreased safe awareness  Prognosis: Fair  REQUIRES OT FOLLOW UP: Yes    Treatment Initiated: Treatment and education initiated within context of evaluation. Evaluation time included review of current medical information, gathering information related to past medical, social and functional history, completion of standardized testing, formal and informal observation of tasks, assessment of data and development of plan of care and goals. Treatment time included skilled education and facilitation of tasks to increase safety and independence with ADL's for improved functional independence and quality of life. Discharge Recommendations:  24 hour supervision or assist, Patient would benefit from continued therapy after discharge, ECF with OT    Patient Education:  OT Education: OT Role, Plan of Care, Orientation, Transfer Training    Equipment Recommendations:  Equipment Needed: No  Other: defer    Plan:  Times per week: 3-5x  Current Treatment Recommendations: Balance Training, Functional Mobility Training, Self-Care / ADL, Safety Education & Training, Cognitive Reorientation. See long-term goal time frame for expected duration of plan of care. If no long-term goals established, a short length of stay is anticipated.     Goals:     Short term goals  Time Frame for Short term goals: By discharge  Short term goal 1: Pt to be oriented to place and situation for increased participation in preferred occupations  Short term goal 2: Pt to follow simple commands >75% of the time for increased indep with basic self care  Short term goal 3: Pt will safely navigate around environmental barriers with SUP for inc indep accessing dining room         Following session, patient left in safe position with all fall risk precautions in place.

## 2020-07-14 NOTE — PROGRESS NOTES
Comprehensive Nutrition Assessment    Type and Reason for Visit:  Initial, Positive Nutrition Screen(Weight Loss/Decreased Appetite/Intake)    Nutrition Recommendations/Plan:   *Recommend a Multivitamin w/minerals daily. *Family declines need for Gluten Free Diet Restriction to be added to the Diet Order despite hx of Celiac disease. *Continue current diet. *Started Ensure TID and Magic Cup TID. Nutrition Assessment: Pt. severely malnourished AEB criteria listed below. At risk for further nutritional compromise r/t admit d/t multiple falls, underlying medical condition (hx dementia, celiac disease, CAD, HTN) and need for nutrition support. Nutrition recommendations/interventions as per above. Malnutrition Assessment:  Malnutrition Status:  Severe malnutrition    Context:  Chronic Illness     Findings of the 6 clinical characteristics of malnutrition:  Energy Intake:  7 - 75% or less estimated energy requirements for 1 month or longer  Weight Loss:  Unable to assess(no weight hx per EMR)     Body Fat Loss:  7 - Severe body fat loss Orbital, Fat Overlying Ribs   Muscle Mass Loss:  7 - Severe muscle mass loss Temples (temporalis), Clavicles (pectoralis & deltoids)  Fluid Accumulation:  No significant fluid accumulation Extremities   Strength:  Not Performed    Estimated Daily Nutrient Needs:  Energy (kcal):  2475-0306 kcal/day (20-25 kcal/kg - 65.5 kg on 7/13- estimated weight)  Protein (g):  46-58 g/day (1.2-1.5 g/kg - IBW 38.6 kg)     Nutrition Related Findings:  appears cahcexic; sleeping during visit; spoke with family; no c/o N/V; last BM x1 on 7/13; family denies pt having difficulty chewing/swallowing food;family states patient is \"Gluten Free\" but still is able to tolerate some Gluten and knows what she can and cannot have; pt admitted from [facility]; pt ate 26-50% of breakfast today per family.  Per EMR:      Wounds:  Pressure Ulcer, Stage I(coccyx)       Current Nutrition Therapies:    DIET GENERAL; No Added Salt (3-4 GM)    Anthropometric Measures:  · Height: 4' 9\" (144.8 cm)  · Current Body Weight: 140 lb (63.5 kg)(7/13; estmimated weight; no edema noted)   · Admission Body Weight: 140 lb (63.5 kg)(7/13; estmimated weight; no edema noted)    · Usual Body Weight: 140 lb (63.5 kg)(per family report)     · Ideal Body Weight: 85 lbs; 164.7 lbs   · BMI: 30.3  · BMI Categories: Obese Class 1 (BMI 30.0-34. 9)       Nutrition Diagnosis:   · Severe malnutrition, In context of chronic illness related to cognitive or neurological impairment, inadequate protein-energy intake as evidenced by poor intake prior to admission, severe muscle loss, severe loss of subcutaneous fat      Nutrition Interventions:   Food and/or Nutrient Delivery:  Continue Current Diet, Start Oral Nutrition Supplement, Vitamin Supplement  Nutrition Education/Counseling:  (Encourage family to encourage adequate po intake at best effort)   Coordination of Nutrition Care:  Continued Inpatient Monitoring, Feeding Assistance/Environment Change    Goals:  Pt will consume 75% or more of meals during LOS       Nutrition Monitoring and Evaluation:   Food/Nutrient Intake Outcomes:  Food and Nutrient Intake, Supplement Intake, Vitamin/Mineral Intake  Physical Signs/Symptoms Outcomes:  Nutrition Focused Physical Findings, Skin, Weight     Electronically signed by Nathaly Beard RD, LD on 7/14/20 at 12:10 PM EDT    Contact: [phone]

## 2020-07-14 NOTE — PROGRESS NOTES
Hospitalist Progress Note      Patient:  Vickey Rowe    Unit/Bed:5K-09/009-A  YOB: 1940  MRN: [id number]   Acct: [de-identified]   PCP: CATRINA Mason CNP  Date of Admission: 7/13/2020    Assessment/Plan:    1. Delirium on top of dementia-has cognitive decline since early this year after her  passed-seen by the psychiatrist planning for a drug holiday to see if there is any improvement patient was on scheduled Seroquel, Ativan, Abilify, oxybutynin and trazodone-medications adjusted and changed per psychiatrist see  medication list below. Use Seroquel before haloperidol for sundowning or increased agitation as per psychiatrist recommendation  2. 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 Severe malnutrition-dietary consulted    Patient is a DNR CC  Keep off telemetry  Any medication changes for behavioral needs needs to be consulted with the psychiatrist.  Eventually discharged back to the [facility] psych unit. Chief Complaint: Confusion    Initial H and P:-    This is a chronically ill 77-year-old female who presented to [facility] emergency department due to multiple falls. Patient has a significant psych history, patient was recently in a [place] [address] unit for a total of 5 weeks in [facility]. Patient has had multiple bouts of suicidal ideation and was found acting up on with night gown wrapped around her neck. Patient was just recently sent to [facility], memory unit. Patient has had multiple falls since she has came to nursing home. Per patient's daughter, patient was up and walking around and able to have a conversation when she last saw her. Patient's daughter states that since she has been in [facility] psych unit, the patient has not been able to have a conversation.   Patient's daughter believes that patient is on too many medications. Subjective (past 24 hours):   Has a bedside sitter  Currently calm alert and awake however not oriented  Denies any nausea vomiting diarrhea chest pain    Past medical history, family history, social history and allergies reviewed again and is unchanged since admission. ROS (12 point review of systems completed. Pertinent positives noted. Otherwise ROS is negative)     Medications:  Reviewed    Infusion Medications   Scheduled Medications    [START ON 7/15/2020] DULoxetine  30 mg Oral Daily    amLODIPine  5 mg Oral Daily    atorvastatin  40 mg Oral Nightly    potassium chloride  20 mEq Oral Daily    memantine  5 mg Oral BID    levothyroxine  25 mcg Oral Daily    sodium chloride flush  10 mL Intravenous 2 times per day    enoxaparin  40 mg Subcutaneous Q24H     PRN Meds: QUEtiapine, haloperidol lactate, traZODone, tiZANidine, polyethylene glycol, sodium chloride flush, acetaminophen **OR** acetaminophen, promethazine **OR** ondansetron      Intake/Output Summary (Last 24 hours) at 7/14/2020 1524  Last data filed at 7/14/2020 1006  Gross per 24 hour   Intake 310 ml   Output --   Net 310 ml       Diet:  DIET GENERAL; No Added Salt (3-4 GM)  Dietary Nutrition Supplements: Standard High Calorie Oral Supplement  Dietary Nutrition Supplements: Frozen Oral Supplement    Exam:  BP (!) 122/57   Pulse 60   Temp 97.8 °F (36.6 °C) (Oral)   Resp 16   Ht 4' 9\" (1.448 m)   Wt 140 lb (63.5 kg)   SpO2 97%   BMI 30.30 kg/m²   General appearance: No apparent distress, appears stated age and cooperative. HEENT: Pupils equal, round, and reactive to light. Conjunctivae/corneas clear. Neck: Supple, with full range of motion. No jugular venous distention. Trachea midline. Respiratory:  Normal respiratory effort. Clear to auscultation, bilaterally without Rales/Wheezes/Rhonchi. Cardiovascular: Regular rate and rhythm with normal S1/S2 without murmurs, rubs or gallops.   Abdomen: Soft, non-tender, non-distended with normal bowel sounds. Musculoskeletal: passive and active ROM x 4 extremities. Skin: Skin color, texture, turgor normal.  No rashes or lesions. Neurologic:  Neurovascularly intact without any focal sensory/motor deficits. Cranial nerves: II-XII intact, grossly non-focal.  Psychiatric: Alert and oriented, thought content appropriate, normal insight  Capillary Refill: Brisk,< 3 seconds   Peripheral Pulses: +2 palpable, equal bilaterally     Labs:   Recent Labs     07/13/20  0940 07/14/20  0640   WBC 7.4 6.9   HGB 13.2 11.9*   HCT 40.9 37.5    247     Recent Labs     07/13/20  0940 07/14/20  0640    138   K 4.5 4.0    107   CO2 22* 21*   BUN 14 10   CREATININE 0.5 0.4   CALCIUM 8.7 8.3*     No results for input(s): AST, ALT, BILIDIR, BILITOT, ALKPHOS in the last 72 hours. No results for input(s): INR in the last 72 hours. No results for input(s): Jadiel Smoker in the last 72 hours. Microbiology:    Blood culture #1:   Lab Results   Component Value Date    BC No growth-preliminary  No growth   12/02/2017       Blood culture #2:No results found for: Lia Thao    Organism:  Lab Results   Component Value Date    ORG Mixed Growth     05/28/2020       No results found for: LABGRAM    MRSA culture only:No results found for: [facility]    Urine culture:   Lab Results   Component Value Date    LABURIN [place] count: >100,000 CFU/mL 10/08/2018       Respiratory culture: No results found for: CULTRESP    Aerobic and Anaerobic :  No results found for: LABAERO  No results found for: LABANAE    Urinalysis:      Lab Results   Component Value Date    NITRU NEGATIVE 07/06/2020    WBCUA 2-4 06/13/2020    BACTERIA NONE SEEN 06/13/2020    RBCUA NONE SEEN 06/13/2020    BLOODU NEGATIVE 07/06/2020    SPECGRAV 1.025 10/21/2019    GLUCOSEU NEGATIVE 07/06/2020       Radiology:  CT THORACIC SPINE WO CONTRAST   Final Result       1. No definite evidence of acute osseous injury of the thoracic spine. 2. Minimal scalloping of the superior endplate of T3 with 35% central height loss of indeterminate chronicity. 3. Chronic compression deformity of T4, stable compared to 2018. 4. Chronic compression deformity of T12, stable compared to prior CT abdomen and pelvis dated 10/21/2019. **This report has been created using voice recognition software. It may contain minor errors which are inherent in voice recognition technology. **      Final report electronically signed by Dr. Thalia Beckham MD on 7/13/2020 9:31 AM      XR CHEST STANDARD (2 VW)   Final Result   Superior endplate deformity of the T11 vertebral body. **This report has been created using voice recognition software. It may contain minor errors which are inherent in voice recognition technology. **      Final report electronically signed by Dr Ayo Pacheco on 7/13/2020 8:44 AM      XR THORACIC SPINE (3 VIEWS)   Final Result   Superior endplate deformity of the T11 vertebral body. **This report has been created using voice recognition software. It may contain minor errors which are inherent in voice recognition technology. **      Final report electronically signed by Dr Ayo Pacheco on 7/13/2020 8:47 AM        Xr Chest Standard (2 Vw)    Result Date: 7/13/2020  PROCEDURE: XR CHEST (2 VW) CLINICAL INFORMATION: 72-year-old female who fell. COMPARISON: Chest x-ray 07/06/2020. TECHNIQUE: AP and lateral views of the chest were obtained. FINDINGS: The lungs are clear. The cardiac silhouette and pulmonary vasculature are within normal limits. There is no significant pleural effusion or pneumothorax. Visualized portions of the upper abdomen are within normal limits. There is generalized osteopenia. There appears to be a superior endplate deformity of the T11 vertebral body. Superior endplate deformity of the T11 vertebral body. **This report has been created using voice recognition software.  It may contain minor errors which are inherent in voice recognition technology. ** Final report electronically signed by Dr Courtney Aleman on 7/13/2020 8:44 AM    Xr Thoracic Spine (3 Views)    Result Date: 7/13/2020  PROCEDURE: XR THORACIC SPINE (3 VIEWS) CLINICAL INFORMATION: fall. COMPARISON: No prior study. TECHNIQUE: AP, lateral and swimmer's view FINDINGS: There is a superior endplate deformity of I81 vertebral body. There is generalized osteopenia. No additional fractures are identified. There are vascular calcifications in the abdominal aorta. Superior endplate deformity of the T11 vertebral body. **This report has been created using voice recognition software. It may contain minor errors which are inherent in voice recognition technology. ** Final report electronically signed by Dr Courtney Aleman on 7/13/2020 8:47 AM    Ct Thoracic Spine Wo Contrast    Result Date: 7/13/2020  PROCEDURE: CT THORACIC SPINE WO CONTRAST CLINICAL INFORMATION: Evaluate for compression fracture T8. Rod Walker at nursing home. Dementia. COMPARISON: Radiographs from the same date and CT thoracic spine dated 7/31/2018. TECHNIQUE: 3 mm axial noncontrast CT images were obtained to the thoracic spine. Sagittal and coronal reconstructions were created. All CT scans at this facility use dose modulation, iterative reconstruction, and/or weight-based dosing when appropriate to reduce radiation dose to as low as reasonably achievable. FINDINGS:  There is mild scalloping of the superior endplate of T3 with approximately 10% central height loss which has appeared in the interval since 2018. There is anterior wedge shape configuration of the T4 vertebral body with approximately 30% anterior height  loss, unchanged compared to prior CT in 2018. There is an anterior wedge shape configuration of the T12 vertebral body with approximately 40% anterior height loss, stable compared to prior CT abdomen and pelvis dated 10/21/2019.  There is otherwise anatomic vertebral body height and alignment. There is a hemangioma in the T6 vertebral body, unchanged compared to prior exam. No definite acute fracture of the thoracic vertebral column is identified. Visualized portions of the ribs appear intact. Paraspinal soft tissues are unremarkable. There is mild retropulsion of the posterior superior aspect of the T12 vertebral body and spinal canal causing mild spinal canal stenosis, unchanged compared to prior CT in 2019.      1. No definite evidence of acute osseous injury of the thoracic spine. 2. Minimal scalloping of the superior endplate of T3 with 30% central height loss of indeterminate chronicity. 3. Chronic compression deformity of T4, stable compared to 2018. 4. Chronic compression deformity of T12, stable compared to prior CT abdomen and pelvis dated 10/21/2019. **This report has been created using voice recognition software. It may contain minor errors which are inherent in voice recognition technology. ** Final report electronically signed by Dr. Alina Murillo MD on 7/13/2020 9:31 AM      Electronically signed by Ariela Seo MD on 7/14/2020 at 3:24 PM

## 2020-07-15 VITALS
OXYGEN SATURATION: 98 % | SYSTOLIC BLOOD PRESSURE: 106 MMHG | RESPIRATION RATE: 16 BRPM | TEMPERATURE: 98 F | DIASTOLIC BLOOD PRESSURE: 60 MMHG | HEIGHT: 57 IN | BODY MASS INDEX: 30.2 KG/M2 | WEIGHT: 140 LBS | HEART RATE: 69 BPM
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
## 2020-07-15 LAB — SARS-COV-2, NAAT: NOT DETECTED

## 2020-07-15 PROCEDURE — G0378 HOSPITAL OBSERVATION PER HR: HCPCS

## 2020-07-15 PROCEDURE — 6370000000 HC RX 637 (ALT 250 FOR IP): Performed by: STUDENT IN AN ORGANIZED HEALTH CARE EDUCATION/TRAINING PROGRAM

## 2020-07-15 PROCEDURE — 99238 HOSP IP/OBS DSCHRG MGMT 30/<: CPT | Performed by: NURSE PRACTITIONER

## 2020-07-15 PROCEDURE — 94760 N-INVAS EAR/PLS OXIMETRY 1: CPT

## 2020-07-15 PROCEDURE — 97110 THERAPEUTIC EXERCISES: CPT

## 2020-07-15 PROCEDURE — U0002 COVID-19 LAB TEST NON-CDC: HCPCS

## 2020-07-15 PROCEDURE — 97530 THERAPEUTIC ACTIVITIES: CPT

## 2020-07-15 PROCEDURE — 6370000000 HC RX 637 (ALT 250 FOR IP): Performed by: PHYSICIAN ASSISTANT

## 2020-07-15 PROCEDURE — 1200000000 HC SEMI PRIVATE

## 2020-07-15 RX ORDER — MEMANTINE HYDROCHLORIDE 5 MG/1
5 TABLET ORAL 2 TIMES DAILY
Qty: 60 TABLET | Refills: 0 | Status: SHIPPED | OUTPATIENT
Start: 2020-07-15

## 2020-07-15 RX ORDER — QUETIAPINE FUMARATE 25 MG/1
12.5 TABLET, FILM COATED ORAL NIGHTLY PRN
Qty: 60 TABLET | Refills: 0 | Status: SHIPPED | OUTPATIENT
Start: 2020-07-15 | End: 2020-08-04

## 2020-07-15 RX ADMIN — LEVOTHYROXINE SODIUM 25 MCG: 25 TABLET ORAL at 09:49

## 2020-07-15 RX ADMIN — POTASSIUM CHLORIDE 20 MEQ: 1500 TABLET, EXTENDED RELEASE ORAL at 09:49

## 2020-07-15 RX ADMIN — DULOXETINE HYDROCHLORIDE 30 MG: 30 CAPSULE, DELAYED RELEASE ORAL at 11:06

## 2020-07-15 RX ADMIN — AMLODIPINE BESYLATE 5 MG: 5 TABLET ORAL at 09:48

## 2020-07-15 RX ADMIN — MEMANTINE HYDROCHLORIDE 5 MG: 5 TABLET, FILM COATED ORAL at 11:07

## 2020-07-15 NOTE — CARE COORDINATION
Discharge orders on chart. Phone call placed to judy Stokes at [facility]. Updated her on discharge today with outpatient PT/OT orders on chart. Guerita Franco is okay for her return. Met with patient's daughter Diego Bateman. She is in agreement for discharge today. She will transport her mother to [facility] when her discharge paperwork is complete.  Electronically signed by Efe Garvin RN on 7/15/20 at 2:19 PM EDT

## 2020-07-15 NOTE — CARE COORDINATION
7/15/20, 3:17 PM EDT    DISCHARGE PLANNING EVALUATION    Patient to be discharged to [place] [address] today with orders for outpatient PT/OT. Family to transport to facility. No other needs at this time. 7/15/20, 3:19 PM EDT    Patient goals/plan/ treatment preferences discussed by  and . Patient goals/plan/ treatment preferences reviewed with patient/ family. Patient/ family verbalize understanding of discharge plan and are in agreement with goal/plan/treatment preferences. Understanding was demonstrated using the teach back method. AVS provided by RN at time of discharge, which includes all necessary medical information pertaining to the patients current course of illness, treatment, post-discharge goals of care, and treatment preferences.     Services After Discharge  Services At/After Discharge: PT, OT([facility])   IMM Letter  IMM Letter given to Patient/Family/Significant other/Guardian/POA/by[de-identified] Yumiko  IMM Letter date given[de-identified] 07/15/20  IMM Letter time given[de-identified] 46  Observation Status Letter date given[de-identified] 07/13/20  Observation Status Letter time given[de-identified] 9605

## 2020-07-15 NOTE — PLAN OF CARE
Problem: Falls - Risk of:  Goal: Will remain free from falls  Description: Will remain free from falls  Outcome: Ongoing  Note: Patient free from falls this shift. Patient uses call light appropriately, bed in lowest position, nonskid socks on, bed rails up x2, fall sign posted and call light in reach. Patient at risk due to confusion and history of falls. Sitter at bedside, patient is weak and impulsive at times. Continuing to monitor. Problem: Confusion - Acute:  Goal: Absence of continued neurological deterioration signs and symptoms  Description: Absence of continued neurological deterioration signs and symptoms  Outcome: Ongoing  Note: Patient is alert to person and place only. Problem: Discharge Planning:  Goal: Ability to perform activities of daily living will improve  Description: Ability to perform activities of daily living will improve  Outcome: Ongoing  Note: Discharge plans to return to [address] [place] [address] discussed with patient. Problem: Skin Integrity:  Goal: Will show no infection signs and symptoms  Description: Will show no infection signs and symptoms  Outcome: Ongoing  Note: No sign or symptoms of infection this shift. Care plan reviewed with patient and family. Patient and family verbalize understanding of the plan of care and contribute to goal setting.

## 2020-07-15 NOTE — PROGRESS NOTES
Called report to Lauren Mayer RN at [facility], she confirmed they received a copy of the discharge instructions. All questions answered. Updated family, discharge instructions given, all questions answered. Discahrge back to [facility] with all belongings per daughter.

## 2020-07-15 NOTE — PROGRESS NOTES
Bhargavi Simpler [address] [facility] 5K  Occupational Therapy  Daily Note  Time:   Time In: 1110  Time Out: 1133  Timed Code Treatment Minutes: 23 Minutes  Minutes: 23          Date: 7/15/2020  Patient Name: Bon Wilkerson,   Gender: female      Room: -009-A  MRN: [id number]  : 1940  (68 y.o.)  Referring Practitioner: PRICILLA Monique  Diagnosis: Multiple falls  Additional Pertinent Hx: Per H&P: chronically ill 37-year-old female who presented to [facility] emergency department due to multiple falls. Patient has a significant psych history, patient was recently in a [facility] unit for a total of 5 weeks in [address] [place] [address]. Patient has had multiple bouts of suicidal ideation and was found acting up on with night gown wrapped around her neck. Patient was just recently sent to [address] [place] [address], memory unit. Patient has had multiple falls since she has came to nursing home. Per patient's daughter, patient was up and walking around and able to have a conversation when she last saw her. Patient's daughter states that since she has been in [place] psych unit, the patient has not been able to have a conversation. Patient's daughter believes that patient is on too many medications. Restrictions/Precautions:  Restrictions/Precautions: General Precautions, Fall Risk  Position Activity Restriction  Other position/activity restrictions: live sitter    SUBJECTIVE: pt lying in bed and did have a live sitter in the room     PAIN: pt did not state any pain during session     COGNITION: Decreased Recall, Decreased Insight, Impaired Memory, Inattention, Decreased Problem Solving and Decreased Safety Awareness, pt able to follow simple command with 75% accuracy , unable to recall that her hair was done just an hour before therapy came     ADL:   Lower Extremity Dressing: Stand By Assistance. pt able to don slipper socks sitting EOB . BALANCE:  Sitting Balance:  Modified Independent. sitting EOB   Standing Balance: Contact Guard Assistance. at EOB and no device     BED MOBILITY:  Supine to Sit: Modified Independent no use of  bedrails   Sit to Supine: Modified Independent no bedrails     TRANSFERS:  Sit to Stand:  Modified Independent. from EOB   Stand to Sit: Modified Independent. to EOB     FUNCTIONAL MOBILITY:  Assistive Device: None  Assist Level:  Stand By Assistance. Distance: around unit   Pt had no LOB, slightly impulsive at times        ASSESSMENT:     Activity Tolerance:  Patient tolerance of  treatment: good. Discharge Recommendations: 24 hour supervision or assist, Patient would benefit from continued therapy after discharge, ECF with OT  Equipment Recommendations: Equipment Needed: No  Other: defer  Plan: Times per week: 3-5x  Current Treatment Recommendations: Balance Training, Functional Mobility Training, Self-Care / ADL, Safety Education & Training, Cognitive Reorientation    Patient Education  Patient Education: Orientation and Importance of Increasing Activity    Goals  Short term goals  Time Frame for Short term goals: By discharge  Short term goal 1: Pt to be oriented to place and situation for increased participation in preferred occupations  Short term goal 2: Pt to follow simple commands >75% of the time for increased indep with basic self care  Short term goal 3: Pt will safely navigate around environmental barriers with SUP for inc indep accessing dining room    Following session, patient left in safe position with all fall risk precautions in place.

## 2020-07-15 NOTE — CARE COORDINATION
7/15/20, 12:33 PM EDT    DISCHARGE ON GOING [address] [facility] day: 0  Location: 5K-09/009-A Reason for admit: Multiple falls [R29.6]  Multiple falls [R29.6]  Fall at home, subsequent encounter [W19. XXXD, I31.825]   Procedure: N/A  Treatment Plan of Care: Observation to Inpatient status. Psychiatry following, home psychotropic medications adjusted, PT/OT, SS, and Dietician following, Lovenox, prn Tylenol, Haldol, Seroquel, Desyrel, Zanaflex, Glycolax, Phenergan, and Zofran, general diet, DNRCC, sitter at bedside, up with assistance. Barriers to Discharge: Psychotropic medication adjustment. PCP: CATRINA Drummond CNP  Readmission Risk Score: 24%  Patient Goals/Plan/Treatment Preferences: Robby Pal is from [facility]. Her daughter-in-law, Aparna Palam, present at bedside states Guerita Franco from [facility] would like to come and evaluate Robby Pal prior to her discharge and is awaiting a call to verify she can do so, updated her she can. She plans to come and evaluate her tomorrow morning. Guerita Frnaco states Robby Pal will need a COVID test prior to her return and will need outpatient PT/OT orders at discharge. Sticky note left on chart.

## 2020-07-15 NOTE — PLAN OF CARE
Consult received-see SW note for 7/14/2020-plan is to return to [facility].

## 2020-07-16 ASSESSMENT — ENCOUNTER SYMPTOMS
DIARRHEA: 0
BACK PAIN: 0
ABDOMINAL PAIN: 0
VOMITING: 0
COLOR CHANGE: 0
COUGH: 0
NAUSEA: 0
CONSTIPATION: 0
SORE THROAT: 0
RHINORRHEA: 0
SHORTNESS OF BREATH: 0

## 2020-07-16 NOTE — ED PROVIDER NOTES
[facility] EMERGENCY DEPT      CHIEF COMPLAINT       Chief Complaint   Patient presents with    Fall       Nurses Notes reviewed and I agree except asnoted in the HPI. HISTORY OFPRESENT ILLNESS    Wayman Gottron is a 68 y.o. female who presents to the emergency department for evaluation of a closed head injury after a fall. The patient's daughter at bedside reports that the patient is been at the gym for the past 3 weeks and just arrived at [facility]. The patient has a history of advanced dementia and is a poor historian. Her daughter provides her history. The patient was reportedly wandering and walked into another resident's room when she fell from a standing position and hit her head on the floor. There was no reported loss of consciousness or injuries. The patient herself denies a headache, neck or back pain, chest pain, shortness of breath, or abdominal pain. Her daughter at bedside denies any noted areas of swelling, bruising, or wounds. The patient takes 81 mg ASA daily but is otherwise not anticoagulated. The patient has a history of hypertension, hyperlipidemia, CAD, MI, hypothyroidism, fibromyalgia, anemia, and depression. There are no additional complaints at this time. REVIEW OF SYSTEMS      Review of Systems   Constitutional: Negative for chills, fatigue and fever. HENT: Negative for congestion, ear pain, rhinorrhea and sore throat. Respiratory: Negative for cough and shortness of breath. Cardiovascular: Negative for chest pain. Gastrointestinal: Negative for abdominal pain, constipation, diarrhea, nausea and vomiting. Genitourinary: Negative for difficulty urinating and dysuria. Musculoskeletal: Negative for arthralgias, back pain, gait problem, joint swelling, myalgias, neck pain and neck stiffness. Skin: Negative for color change, pallor and wound. Neurological: Negative for dizziness, syncope, weakness, light-headedness, numbness and headaches. Hematological: Does not bruise/bleed easily. Psychiatric/Behavioral: Negative for agitation, behavioral problems and confusion. PAST MEDICAL HISTORY    has a past medical history of Anxiety, Arthritis, Bleeding disorder ([address] CAD (coronary artery disease), Cataract, Celiac disease, Dementia ([address] Depression, Fibromyalgia, Hearing impairment, HTN (hypertension), Hyperlipemia, MI (myocardial infarction) ([address] Osteoporosis, Pernicious anemia, Pneumonia, Thyroid disease, Urinary incontinence, Vitamin B12 deficiency, and Vitamin K deficiency. SURGICAL HISTORY      has a past surgical history that includes Hysterectomy (1964); Cholecystectomy (1963); Tonsillectomy (as a child ); Appendectomy (1963); Coronary angioplasty with stent (5/2007); bladder repair; other surgical history (5-16-14); Total knee arthroplasty; Upper gastrointestinal endoscopy (2004); Colonoscopy (2004, 2014, 2015); and Knee arthroscopy (Left, 07/19/2016). CURRENT MEDICATIONS       Discharge Medication List as of 7/6/2020  5:35 PM      CONTINUE these medications which have NOT CHANGED    Details   polyethylene glycol (GLYCOLAX) 17 g packet Take 17 g by mouth daily as needed for ConstipationHistorical Med      nystatin (NYSTATIN) 560626 UNIT/GM powder Apply topically 4 times daily Apply topically 4 times daily. , Topical, 4 TIMES DAILY, Historical Med      amLODIPine (NORVASC) 5 MG tablet Take 5 mg by mouth dailyHistorical Med      LORazepam (ATIVAN) 1 MG tablet Take 1 mg by mouth every 6 hours as needed for Anxiety. Historical Med      ARIPiprazole (ABILIFY) 2 MG tablet Take 2 mg by mouth dailyHistorical Med      memantine ER (NAMENDA XR) 14 MG CP24 extended release capsule Take 14 mg by mouth dailyHistorical Med      haloperidol (HALDOL) 5 MG tablet Take 5 mg by mouth 4 times daily as needed for AgitationHistorical Med      tiZANidine (ZANAFLEX) 4 MG tablet Take 4 mg by mouth 2 times daily as neededHistorical Med      QUEtiapine saturation is 100%. Physical Exam  Vitals signs and nursing note reviewed. Constitutional:       General: She is not in acute distress. Appearance: Normal appearance. She is well-developed. She is not ill-appearing, toxic-appearing or diaphoretic. HENT:      Head: Normocephalic and atraumatic. No raccoon eyes, Peters's sign, abrasion, contusion, masses or laceration. Comments: There was no edema, bruising, abrasion, or laceration noted of the face or scalp. There was no tenderness or crepitus of the face or scalp. There were no palpable bony or soft tissue abnormalities of the face or scalp. Right Ear: External ear normal.      Left Ear: External ear normal.      Nose: Nose normal. No nasal deformity, signs of injury, laceration or nasal tenderness. Mouth/Throat:      Mouth: Mucous membranes are moist.      Pharynx: Oropharynx is clear. Eyes:      General: No scleral icterus. Right eye: No discharge. Left eye: No discharge. Conjunctiva/sclera: Conjunctivae normal.      Pupils: Pupils are equal, round, and reactive to light. Neck:      Musculoskeletal: Full passive range of motion without pain, normal range of motion and neck supple. Normal range of motion. No edema, erythema, neck rigidity, crepitus, injury, pain with movement, torticollis, spinous process tenderness or muscular tenderness. Cardiovascular:      Rate and Rhythm: Normal rate and regular rhythm. Heart sounds: Normal heart sounds. No murmur. No friction rub. No gallop. Pulmonary:      Effort: Pulmonary effort is normal. No respiratory distress. Breath sounds: Normal breath sounds. No stridor. No wheezing, rhonchi or rales. Comments: There is no chest wall tenderness, edema, bruising, or crepitus. Lung sounds are clear and equal bilaterally. Chest:      Chest wall: No tenderness. Abdominal:      General: Bowel sounds are normal. There is no distension.       Palpations: Abdomen is soft. There is no mass. Tenderness: There is no abdominal tenderness. There is no guarding or rebound. Hernia: No hernia is present. Comments: There is no abdominal tenderness, bruising, edema, distension, or crepitus. Musculoskeletal: Normal range of motion. General: No swelling, tenderness or deformity. Right shoulder: Normal. She exhibits normal range of motion, no tenderness, no swelling, no effusion, no crepitus, no deformity, no pain, no spasm and normal strength. Left shoulder: Normal. She exhibits normal range of motion, no tenderness, no swelling, no effusion, no crepitus, no deformity, no pain, no spasm and normal strength. Right hip: Normal. She exhibits normal range of motion, normal strength, no tenderness, no swelling, no crepitus and no deformity. Left hip: Normal. She exhibits normal range of motion, normal strength, no tenderness, no swelling, no crepitus and no deformity. Cervical back: Normal. She exhibits normal range of motion, no tenderness, no swelling, no deformity, no pain and no spasm. Thoracic back: Normal. She exhibits normal range of motion, no tenderness, no swelling, no deformity, no pain and no spasm. Lumbar back: Normal. She exhibits normal range of motion, no tenderness, no swelling, no deformity, no pain and no spasm. Comments: There is no midline spinal or paraspinal tenderness of the cervical, thoracic, or lumbar spine. There is good spinal ROM. Patient has good ROM and strength of the extremities. There is intact sensation of soft touch in the extremities. The extremities appear to be neurovascularly intact. Skin:     General: Skin is warm and dry. Coloration: Skin is not pale. Findings: No bruising, erythema or rash. Neurological:      General: No focal deficit present. Mental Status: She is alert and oriented to person, place, and time. Mental status is at baseline.       GCS: GCS eye subscore is 4. GCS verbal subscore is 5. GCS motor subscore is 6. Cranial Nerves: No cranial nerve deficit. Motor: No abnormal muscle tone. Coordination: Coordination normal.      Comments: There were no noted cranial nerve or focal neurologic deficits. Cranial nerves II through XII are grossly intact. The patient is demented but appears to mentating at her baseline. Psychiatric:         Behavior: Behavior normal.         Thought Content: Thought content normal.               DIFFERENTIAL DIAGNOSIS:   Includes but not limited to: fall, hematoma, concussion, TBI, ICH, contusion, strain, sprain, dislocation, fracture    DIAGNOSTIC RESULTS     EKG: All EKG's are interpreted by the Emergency Department Physician who either signs or Co-signsthis chart in the absence of a cardiologist.  EKG Fall (Final result)    Component (Lab Inquiry)   Collection Time  Result Time  Ventricular Rate  Atrial Rate  P-R Interval  QRS Duration  Q-T Interval    07/06/20 16:19:14  07/06/20 16:19:14  61  61  144  80  428         Collection Time  Result Time  QTc Calculation (Bazett)  P Axis  R Axis  T Axis    07/06/20 16:19:14  07/06/20 16:19:14  430  47  -7  33           Final result                 Narrative:     Poor data quality, interpretation may be adversely affected   Normal sinus rhythm   Normal ECG   When compared with ECG of 29-MAY-2020 03:16,   Premature atrial complexes are no longer Present   T wave inversion no longer evident in Anterior leads   Confirmed by Mauro Weir ([id number]) on 7/6/2020 6:06:27 PM                 RADIOLOGY: non-plain film images(s) such as CT, Ultrasound and MRI are read by the radiologist.  Plainradiographic images are visualized and preliminarily interpreted by the emergency physician unless otherwise stated below. CT Head WO Contrast   Final Result   1. No mass effect or acute hemorrhage. 2. Chronic periventricular small vessel ischemic changes and cerebral atrophy.             **This report 100% 100%    Weight: 129 lb 5 oz (58.7 kg)     Height: 4' 9\" (1.448 m)       The patient was seen and evaluated within the ED today after falling and hitting her head. Within the department, I observed the patient's vital signs to be within acceptable range. On exam, I appreciated no edema, bruising, abrasion, or laceration noted of the face or scalp. There was no tenderness or crepitus of the face or scalp. There were no palpable bony or soft tissue abnormalities of the face or scalp. There were no noted cranial nerve or focal neurologic deficits. Cranial nerves II through XII are grossly intact. The patient is demented but appears to mentating at her baseline. There is no chest wall tenderness, edema, bruising, or crepitus. Lung sounds are clear and equal bilaterally. There is no abdominal tenderness, bruising, edema, distension, or crepitus. There is no midline spinal or paraspinal tenderness of the cervical, thoracic, or lumbar spine. There is good spinal ROM. Patient has good ROM and strength of the extremities. There is intact sensation of soft touch in the extremities. The extremities appear to be neurovascularly intact. Radiologic studies within the department revealed no acute hemorrhage, infarct, mass, or other acute cranial or intracranial pathology. C-spine imaging revealed no acute fracture, dislocation, or soft tissue abnormality. CXR and pelvic XRs revealed no acute fracture, dislocation, or soft tissue abnormality. Laboratory work was reassuring. Troponin is negative. Electrolyte levels are in reassuring range. UA is not suspicious for a UTI. I observed the patient's condition to remain stable during the duration of the stay. I explained my proposed course of treatment to the patient and her daughter, who were amenable to my treatment and discharge decisions.  The patient was discharged home in stable condition, and the patient will return to the ED if the symptoms become more severe in nature or otherwise change. CRITICAL CARE:   None    CONSULTS:  None    PROCEDURES:  None    FINAL IMPRESSION      1. Fall, initial encounter    2. Closed head injury, initial encounter          DISPOSITION/PLAN     1. Fall, initial encounter    2. Closed head injury, initial encounter      I have given the patient strict written and verbal instructions about care at home, follow-up, and signs and symptoms of worsening of condition, and the patient did verbalize understanding of these instructions. Patient was discharged in stable condition. Will return if symptoms change or worsen, or for any sign or symptom deemed emergent by the patient or family members. Follow up as an outpatient or sooner if symptoms warrant.      PATIENT REFERRED TO:  DO Kayla Devine [id number] [phone]    Call in 1 day  As needed      DISCHARGE MEDICATIONS:  Discharge Medication List as of 7/6/2020  5:35 PM          (Please note that portions of this note werecompleted with a voice recognition program.  Efforts were made to edit the dictations but occasionally words are mis-transcribed.)    PAT Herndon PA-C  07/16/20 1228

## 2020-08-03 ENCOUNTER — HOSPITAL ENCOUNTER (EMERGENCY)
Age: 80
Discharge: NURSING, REHAB OR LONG-TERM CARE | End: 2020-08-03
Attending: EMERGENCY MEDICINE
Payer: MEDICARE

## 2020-08-03 ENCOUNTER — APPOINTMENT (OUTPATIENT)
Dept: CT IMAGING | Age: 80
End: 2020-08-03
Payer: MEDICARE

## 2020-08-03 VITALS
RESPIRATION RATE: 12 BRPM | HEART RATE: 54 BPM | HEIGHT: 57 IN | OXYGEN SATURATION: 98 % | BODY MASS INDEX: 30.2 KG/M2 | DIASTOLIC BLOOD PRESSURE: 60 MMHG | TEMPERATURE: 97.8 F | WEIGHT: 140 LBS | SYSTOLIC BLOOD PRESSURE: 132 MMHG

## 2020-08-03 PROCEDURE — 72125 CT NECK SPINE W/O DYE: CPT

## 2020-08-03 PROCEDURE — 70450 CT HEAD/BRAIN W/O DYE: CPT

## 2020-08-03 PROCEDURE — 99284 EMERGENCY DEPT VISIT MOD MDM: CPT

## 2020-08-03 NOTE — ED NOTES
Patient resting on cot, respirations are easy and unlabored. Updated on POC to pt daughter who is at bedside. No other needs or concerns were voiced.       Benjamin Chávez RN  08/03/20 1016

## 2020-08-03 NOTE — ED PROVIDER NOTES
TABLET DAILY    LOPERAMIDE (IMODIUM) 2 MG CAPSULE    Take 2 mg by mouth 4 times daily as needed    LORAZEPAM (ATIVAN) 1 MG TABLET    Take 1 mg by mouth every 6 hours as needed for Anxiety. MEMANTINE (NAMENDA) 5 MG TABLET    Take 1 tablet by mouth 2 times daily    NYSTATIN (NYSTATIN) 735433 UNIT/GM POWDER    Apply topically 4 times daily Apply topically 4 times daily. POLYETHYLENE GLYCOL (GLYCOLAX) 17 G PACKET    Take 17 g by mouth daily as needed for Constipation    POTASSIUM CHLORIDE (KLOR-CON M) 20 MEQ EXTENDED RELEASE TABLET    TAKE 1 TABLET DAILY    QUETIAPINE (SEROQUEL) 25 MG TABLET    Take 0.5 tablets by mouth nightly as needed for Agitation    RALOXIFENE (EVISTA) 60 MG TABLET    TAKE 1 TABLET DAILY    VITAMIN B-12 (CYANOCOBALAMIN) 500 MCG TABLET    Take 1 tablet by mouth daily       ALLERGIES     is allergic to iodides; iodine; morphine; motrin [ibuprofen micronized]; and percocet [oxycodone-acetaminophen]. FAMILY HISTORY     She indicated that her mother is . She indicated that her father is . She indicated that her maternal grandmother is . She indicated that her maternal grandfather is . She indicated that her paternal grandmother is . She indicated that her paternal grandfather is . family history includes Asthma in her father; Cancer in her mother. SOCIAL HISTORY      reports that she has never smoked. She has never used smokeless tobacco. She reports that she does not drink alcohol or use drugs. PHYSICAL EXAM     INITIAL VITALS:  height is 4' 9\" (1.448 m) and weight is 140 lb (63.5 kg). Her oral temperature is 97.8 °F (36.6 °C). Her blood pressure is 132/60 and her pulse is 54. Her respiration is 12 and oxygen saturation is 98%. Constitutional: Chronically ill-appearing  Eyes:  Pupils are equal and reactive, extraocular muscles intact   HENT: No crepitance or step-off on the face.   No hematomas lacerations  oropharynx moist, no

## 2020-08-03 NOTE — ED NOTES
Called report to [address] from [address] [place] [address]. Patient is being transported back to [address] by pt daughter and nursing home is aware.       Marisel Doe RN  08/03/20 4997

## 2020-08-03 NOTE — ED NOTES
Bed: 024A  Expected date:   Expected time:   Means of arrival: [facility]  Comments:     Echo Landis RN  08/03/20 [id number]

## 2020-08-03 NOTE — ED TRIAGE NOTES
Patient arrived to room 24 via [facility] with c/o fall. EMS stated patient thought her shower curtain was the wall and leaned back and hit her head. Patient is from nursing home in dementia care unit. Patient is alert for her condition.

## 2020-08-04 ENCOUNTER — OUTSIDE SERVICES (OUTPATIENT)
Dept: FAMILY MEDICINE CLINIC | Age: 80
End: 2020-08-04
Payer: MEDICARE

## 2020-08-04 VITALS
SYSTOLIC BLOOD PRESSURE: 136 MMHG | WEIGHT: 124 LBS | RESPIRATION RATE: 16 BRPM | TEMPERATURE: 98.5 F | HEART RATE: 72 BPM | DIASTOLIC BLOOD PRESSURE: 54 MMHG | BODY MASS INDEX: 26.83 KG/M2

## 2020-08-04 PROBLEM — F33.3 MAJOR DEPRESSIVE DISORDER, RECURRENT EPISODE, SEVERE, WITH PSYCHOSIS (HCC): Status: ACTIVE | Noted: 2020-08-04
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
## 2020-08-04 RX ORDER — QUETIAPINE FUMARATE 25 MG/1
12.5 TABLET, FILM COATED ORAL NIGHTLY
Qty: 60 TABLET | Refills: 0
Start: 2020-08-04 | End: 2020-10-21 | Stop reason: DRUGHIGH

## 2020-08-18 ENCOUNTER — OUTSIDE SERVICES (OUTPATIENT)
Dept: FAMILY MEDICINE CLINIC | Age: 80
End: 2020-08-18
Payer: MEDICARE

## 2020-08-18 VITALS
RESPIRATION RATE: 16 BRPM | TEMPERATURE: 97.3 F | HEART RATE: 64 BPM | BODY MASS INDEX: 26.83 KG/M2 | WEIGHT: 124 LBS | SYSTOLIC BLOOD PRESSURE: 124 MMHG | DIASTOLIC BLOOD PRESSURE: 56 MMHG

## 2020-08-18 NOTE — PROGRESS NOTES
Alis Fofana is a 68 y.o. female who presents today for her medical conditions/complaints as noted below. Chief Complaint   Patient presents with    Depression           HPI:     [facility] is seen today as she continues with depressive s/s and has tearful events daily. She is up ambulating this am and denies mood indicators but has Dementia with behavioral disturbances. Nursing staff reports that this starts to occur as the day goes on especially early afternoon. Current Outpatient Medications   Medication Sig Dispense Refill    QUEtiapine (SEROQUEL) 25 MG tablet Take 0.5 tablets by mouth nightly 60 tablet 0    memantine (NAMENDA) 5 MG tablet Take 1 tablet by mouth 2 times daily 60 tablet 0    DULoxetine (CYMBALTA) 30 MG extended release capsule Take 2 capsules by mouth daily 30 capsule 5    acetaminophen (TYLENOL) 325 MG tablet 2 tabs every 6 hours prn 2 tablet 5    loperamide (IMODIUM) 2 MG capsule Take 2 mg by mouth 4 times daily as needed      polyethylene glycol (GLYCOLAX) 17 g packet Take 17 g by mouth daily as needed for Constipation      nystatin (NYSTATIN) 874534 UNIT/GM powder Apply topically 4 times daily Apply topically 4 times daily.  amLODIPine (NORVASC) 5 MG tablet Take 5 mg by mouth daily      LORazepam (ATIVAN) 1 MG tablet Take 1 mg by mouth every 6 hours as needed for Anxiety.  raloxifene (EVISTA) 60 MG tablet TAKE 1 TABLET DAILY 90 tablet 3    atorvastatin (LIPITOR) 40 MG tablet TAKE 1 TABLET NIGHTLY 90 tablet 4    potassium chloride (KLOR-CON M) 20 MEQ extended release tablet TAKE 1 TABLET DAILY 90 tablet 4    levothyroxine (SYNTHROID) 25 MCG tablet TAKE 1 TABLET DAILY 90 tablet 3    Handicap Placard MISC by Does not apply route Expires 6/12/2023 1 each 0    vitamin B-12 (CYANOCOBALAMIN) 500 MCG tablet Take 1 tablet by mouth daily 90 tablet 3     No current facility-administered medications for this visit.       Allergies   Allergen Reactions    Iodides Hives   
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
## 2020-09-25 RX ORDER — LORAZEPAM 1 MG/1
1 TABLET ORAL EVERY 6 HOURS PRN
Qty: 120 TABLET | Refills: 3 | Status: SHIPPED | OUTPATIENT
Start: 2020-09-25 | End: 2020-10-25

## 2020-10-21 ENCOUNTER — OUTSIDE SERVICES (OUTPATIENT)
Dept: FAMILY MEDICINE CLINIC | Age: 80
End: 2020-10-21

## 2020-10-21 ENCOUNTER — OUTSIDE SERVICES (OUTPATIENT)
Dept: FAMILY MEDICINE CLINIC | Age: 80
End: 2020-10-21
Payer: MEDICARE

## 2020-10-21 VITALS
TEMPERATURE: 98.5 F | HEART RATE: 82 BPM | OXYGEN SATURATION: 96 % | DIASTOLIC BLOOD PRESSURE: 67 MMHG | RESPIRATION RATE: 14 BRPM | SYSTOLIC BLOOD PRESSURE: 124 MMHG

## 2020-10-21 VITALS
TEMPERATURE: 98.5 F | DIASTOLIC BLOOD PRESSURE: 67 MMHG | RESPIRATION RATE: 14 BRPM | SYSTOLIC BLOOD PRESSURE: 124 MMHG | HEART RATE: 82 BPM | OXYGEN SATURATION: 96 %

## 2020-10-21 PROCEDURE — 99490 CHRNC CARE MGMT STAFF 1ST 20: CPT | Performed by: NURSE PRACTITIONER

## 2020-10-21 RX ORDER — DULOXETIN HYDROCHLORIDE 30 MG/1
30 CAPSULE, DELAYED RELEASE ORAL DAILY
COMMUNITY

## 2020-10-21 RX ORDER — QUETIAPINE FUMARATE 25 MG/1
12.5 TABLET, FILM COATED ORAL NIGHTLY PRN
COMMUNITY

## 2020-10-21 RX ORDER — TRAZODONE HYDROCHLORIDE 50 MG/1
50 TABLET ORAL NIGHTLY
COMMUNITY

## 2020-10-21 RX ORDER — QUETIAPINE FUMARATE 25 MG/1
12.5 TABLET, FILM COATED ORAL NIGHTLY PRN
Qty: 60 TABLET | Refills: 0 | Status: SHIPPED
Start: 2020-10-21 | End: 2021-01-19

## 2020-10-21 NOTE — PROGRESS NOTES
Sun Harden is a 68 y.o. female that presents for 3 Month Follow-Up (Follow up on chronic health conditions)      This visit was performed via synchronous telecommunication system. Patient is located in the SNF  I am located in my office    HPI:  Brian Riddle" is seen today for follow up on chronic health conditions. She is seen with the assist of the [address] She is lying in bed and has primary diagnosis of dementia. She also has a PMH of CAD, HTN, HLD, depression, hypothyroidism, B12 deficiency, OA, Celiac disease, OA and fibromyalgia. She was then admitted to [facility] on 6/15. She has other PMH of CAD, Dementia, HTN, HLD, depression, Hypothyroidism, B12 deficiency, OA, Celiac disease, OA, and Fibromyalgia. Per nursing she continues with tearfulness mostly on day shift. This is sometimes redirectable, but will just cry for no reason. She is frail and does require cues and supervision. She remains forgetful and will wander. Blood pressure remains stable on current regimen. She has had no episodes of chest pain or shortness of breath. I have reviewed the patient's past medical history, past surgical history, allergies,medications, social and family history and I have made updates where appropriate.     Past Medical History:   Diagnosis Date    Anxiety     Arthritis     Bleeding disorder ([address]     CAD (coronary artery disease)     Stent in 1st diagonal in 2007    Cataract     Celiac disease     Dementia ([address]     Depression     Fibromyalgia     Hearing impairment     HTN (hypertension)     Hyperlipemia     MI (myocardial infarction) ([address] 2003    Osteoporosis     Pernicious anemia     Pneumonia     Thyroid disease     Urinary incontinence     Vitamin B12 deficiency     Vitamin K deficiency        Past Surgical History:   Procedure Laterality Date    APPENDECTOMY  1963    BLADDER REPAIR      x2    CHOLECYSTECTOMY  1963    COLONOSCOPY  2004, 2014, 2015    CORONARY ANGIOPLASTY WITH STENT PLACEMENT  5/2007    HYSTERECTOMY  1964    KNEE ARTHROSCOPY Left 07/19/2016    Dr Sadie Jim HISTORY  5-16-14    Colonoscopy  2. [address] [facility] Hemorrhoidpexy    TONSILLECTOMY  as a child     TOTAL KNEE ARTHROPLASTY      UPPER GASTROINTESTINAL ENDOSCOPY  2004       Social History     Tobacco Use    Smoking status: Never Smoker    Smokeless tobacco: Never Used   Substance Use Topics    Alcohol use: No    Drug use: No       Family History   Problem Relation Age of Onset    Cancer Mother         liver, lung    Asthma Father          Review of Systems   Unable to perform ROS: Dementia           PHYSICAL EXAM:  Vitals:    10/21/20 1555   BP: 124/67   Pulse: 82   Resp: 14   Temp: 98.5 °F (36.9 °C)   SpO2: 96%        Physical Exam  Vitals signs and nursing note reviewed. Exam conducted with a chaperone present. Constitutional:       General: She is sleeping. She is not in acute distress. Appearance: Normal appearance. She is underweight. She is not ill-appearing or diaphoretic. HENT:      Head: Normocephalic and atraumatic. Right Ear: Hearing normal.      Left Ear: Hearing normal.      Nose: Nose normal. No rhinorrhea. Mouth/Throat:      Lips: Pink. No lesions. Eyes:      General: Lids are normal. No scleral icterus. Neck:      Musculoskeletal: Normal range of motion and neck supple. Pulmonary:      Effort: Pulmonary effort is normal. No accessory muscle usage, respiratory distress or retractions. Abdominal:      General: Abdomen is flat. There is no distension. Musculoskeletal:      Right lower leg: No edema. Left lower leg: No edema. Skin:     Coloration: Skin is pale. Findings: No bruising or rash. Neurological:      General: No focal deficit present. Mental Status: She is disoriented. Psychiatric:         Speech: She is noncommunicative. Cognition and Memory: Cognition normal. Memory is impaired.  She exhibits impaired recent memory and impaired remote memory. ASSESSMENT & PLAN  Endi Don was seen today for 3 month follow-up. Diagnoses and all orders for this visit:    Dementia with behavioral disturbance, unspecified dementia type ([facility]  Chronic and continues with episodes of anxiety. Continue Namenda, Cymbalta, Trazodone, Seroquel, and Ativan. Continue to support. Anxiety  Has episodes of tearfulness. Will continue Ativan and Seroquel. Major depressive disorder, recurrent episode, severe, with psychosis ([facility]  Chronic and continue Cymbalta. Essential hypertension  Blood pressures controlled. Continue Norvasc. Celiac disease  Chronic and continue to monitor. Coronary artery disease involving native coronary artery of native heart without angina pectoris  No s/s of chest pain. Continue to monitor and address symptomatically. Other specified hypothyroidism  Chronic and continue Levothyroxine and lab monitoring. Hyperlipidemia, unspecified hyperlipidemia type  Chronic and continue Lipitor and lab monitoring. Urge incontinence        No follow-ups on file. Resident has HLD, HTN, Dementia, Celiac disease. and it is expected to last 12 or more months. These chronic conditions place resident at a significant risk of death, acute exacerbation, decline in function or functional decline. The patient's comprehensive plan was monitored today. I spent 20 minutes reviewing plan. I have seen and examined the patient virtually and chaperone was present. The history was obtained through the patient, past medical records, and the staff. The patient's chart was updated as appropriate. Please see facility EMR for complete medication reconciliation.     Pursuant to the emergency declaration under the [facility], [address]5 waiver authority and the [employer] Act, this Virtual  Visit was conducted, with patient's consent, to reduce the patient's risk of exposure to COVID-19 and provide continuity of care for an established patient. Services were provided through a video synchronous discussion virtually to substitute for in-person SNF visit.     Electronically signed by CATRINA Reza CNP on 10/21/2020 at 3:55 PM

## 2020-10-21 NOTE — PROGRESS NOTES
Lana Esteban is a 68 y.o. female who presents today for her medical conditions/complaints as noted below. Chief Complaint   Patient presents with    3 Month Follow-Up     Follow up on chronic health conditions           HPI:     Roger Goddard" is seen today for follow up on chronic health conditions.       [id number]    Past Medical History:   Diagnosis Date    Anxiety     Arthritis     Bleeding disorder ([address]     CAD (coronary artery disease)     Stent in 1st diagonal in 2007    Cataract     Celiac disease     Dementia ([address]     Depression     Fibromyalgia     Hearing impairment     HTN (hypertension)     Hyperlipemia     MI (myocardial infarction) ([address] 2003    Osteoporosis     Pernicious anemia     Pneumonia     Thyroid disease     Urinary incontinence     Vitamin B12 deficiency     Vitamin K deficiency       Past Surgical History:   Procedure Laterality Date    APPENDECTOMY  1963    BLADDER REPAIR      x2    CHOLECYSTECTOMY  1963    COLONOSCOPY  2004, 2014, 2015    CORONARY ANGIOPLASTY WITH STENT PLACEMENT  5/2007    HYSTERECTOMY  1964    KNEE ARTHROSCOPY Left 07/19/2016    Dr Janeth Woods HISTORY  5-16-14    Colonoscopy  2.  [facility] Hemorrhoidpexy    TONSILLECTOMY  as a child     TOTAL KNEE ARTHROPLASTY      UPPER GASTROINTESTINAL ENDOSCOPY  2004       Family History   Problem Relation Age of Onset    Cancer Mother         liver, lung    Asthma Father        Social History     Tobacco Use    Smoking status: Never Smoker    Smokeless tobacco: Never Used   Substance Use Topics    Alcohol use: No      Allergies   Allergen Reactions    Iodides Hives    Iodine Hives    Morphine Hives    Motrin [Ibuprofen Micronized] Hives    Percocet [Oxycodone-Acetaminophen]      Pt report \"makes me Short of breath\"        Health Maintenance   Topic Date Due    Shingles Vaccine (1 of 2) 05/30/1992    Annual Wellness Visit (AWV)  05/29/2019    Flu vaccine (1) 09/01/2020    TSH testing  06/13/2021    Lipid screen  06/17/2021    DTaP/Tdap/Td vaccine (2 - Td) 08/06/2026    DEXA (modify frequency per FRAX score)  Completed    Pneumococcal 65+ years Vaccine  Completed    Hepatitis A vaccine  Aged Out    Hepatitis B vaccine  Aged Out    Hib vaccine  Aged Out    Meningococcal (ACWY) vaccine  Aged Out       Subjective:      Review of Systems    Objective:     Physical Exam  /67   Pulse 82   Temp 98.5 °F (36.9 °C)   Resp 14   SpO2 96%     Assessment/Plan      1. Dementia with behavioral disturbance, unspecified dementia type ([facility]    2. Anxiety    3. Major depressive disorder, recurrent episode, severe, with psychosis ([facility]    4. Essential hypertension    5. Celiac disease    6. Coronary artery disease involving native coronary artery of native heart without angina pectoris    7. Other specified hypothyroidism    8. Hyperlipidemia, unspecified hyperlipidemia type    9. Urge incontinence          Patient seen and examined. History partially obtained by chart review and nursing notes. I have reviewed patient's past medical, surgical, social, and family history and have made updates where appropriate.   See facility EMR for updated medication list.       Electronically signed by CATRINA Huddleston CNP on 10/21/2020 at 3:52 PM

## 2020-11-17 ENCOUNTER — OUTSIDE SERVICES (OUTPATIENT)
Dept: FAMILY MEDICINE CLINIC | Age: 80
End: 2020-11-17
Payer: MEDICARE

## 2020-11-17 VITALS
BODY MASS INDEX: 24.93 KG/M2 | SYSTOLIC BLOOD PRESSURE: 127 MMHG | DIASTOLIC BLOOD PRESSURE: 82 MMHG | RESPIRATION RATE: 16 BRPM | HEART RATE: 88 BPM | TEMPERATURE: 98.4 F | WEIGHT: 115.2 LBS

## 2020-11-17 NOTE — PROGRESS NOTES
Ewa Flores is a 68 y.o. female who presents today for her medical conditions/complaints as noted below. Chief Complaint   Patient presents with    Depression           HPI:     Sariah Rodriguez is seen today for increased tearfulness and wandering. She was admitted back in June from [address] [place] [address]. She had previously been at [facility] in [place] [address]. She has been at another facility and has been dealing with depression and had suicidal thought. Per daughter today these feelings of hopelessness have increased since Covid. She has been following with neurology and does have a follow-up appointment with psychiatry next week. She prefers that this provider not adjust medications today as she feels psychiatry does need to adjust her medications. Reviewed weights today as patient has lost an additional 3.5 pounds in the last month. She feels her mother has given up and this has caused a general decline in her. Offered support to daughter today along with patient today. Patient remains on BuSpar 10 mg 3 times a day,  duloxetine 90 mg daily, Namenda 5 mg twice daily, trazodone 50 mg at bedtime, vitamin B12,  Ativan 1 mg every 6 hours as needed,  and Seroquel 12.5 mg as needed. She has a past medical history of dementia, depression, B12 deficiency, and fibromyalgia. She is receiving Tylenol for pain. She does not answer questions today.       Current Outpatient Medications   Medication Sig Dispense Refill    DULoxetine (CYMBALTA) 30 MG extended release capsule Take 30 mg by mouth daily      traZODone (DESYREL) 50 MG tablet Take 50 mg by mouth nightly      QUEtiapine (SEROQUEL) 25 MG tablet Take 0.5 tablets by mouth nightly as needed 60 tablet 0    QUEtiapine (SEROQUEL) 25 MG tablet Take 12.5 mg by mouth nightly      memantine (NAMENDA) 5 MG tablet Take 1 tablet by mouth 2 times daily 60 tablet 0    DULoxetine (CYMBALTA) 30 MG extended release capsule Take 2 capsules by mouth daily 30 capsule 5    Bowel sounds are normal. There is no distension. Palpations: Abdomen is soft. Tenderness: There is no abdominal tenderness. Musculoskeletal:         General: No deformity. Skin:     General: Skin is warm and dry. Neurological:      Mental Status: She is alert. She is disoriented. Psychiatric:         Attention and Perception: She is inattentive. Mood and Affect: Mood is anxious. Affect is tearful. Speech: Speech is delayed. Behavior: Behavior is withdrawn. Cognition and Memory: Memory is impaired. Assessment/Plan      1. Dementia with behavioral disturbance, unspecified dementia type ([facility]    2. Major depressive disorder, recurrent episode, severe, with psychosis ([facility]      Will continue current meds. Offer support, 1:1, orders written to ensure patient to go to appointment next week. Use as needed medications only if agitated.      Electronically signed by CATRINA Rain CNP on 11/17/2020 at 2:12 PM

## 2020-12-07 RX ORDER — POTASSIUM CHLORIDE 20 MEQ/1
TABLET, EXTENDED RELEASE ORAL
Qty: 90 TABLET | Refills: 3 | Status: SHIPPED | OUTPATIENT
Start: 2020-12-07

## 2021-01-19 ENCOUNTER — OUTSIDE SERVICES (OUTPATIENT)
Dept: FAMILY MEDICINE CLINIC | Age: 81
End: 2021-01-19
Payer: MEDICARE

## 2021-01-19 VITALS
RESPIRATION RATE: 16 BRPM | HEART RATE: 64 BPM | TEMPERATURE: 98.7 F | SYSTOLIC BLOOD PRESSURE: 104 MMHG | DIASTOLIC BLOOD PRESSURE: 56 MMHG | WEIGHT: 90.9 LBS | BODY MASS INDEX: 19.67 KG/M2

## 2021-01-19 DIAGNOSIS — K90.0 CELIAC DISEASE: ICD-10-CM

## 2021-01-19 DIAGNOSIS — E03.8 OTHER SPECIFIED HYPOTHYROIDISM: ICD-10-CM

## 2021-01-19 DIAGNOSIS — F33.3 MAJOR DEPRESSIVE DISORDER, RECURRENT EPISODE, SEVERE, WITH PSYCHOSIS (HCC): ICD-10-CM

## 2021-01-19 DIAGNOSIS — I10 ESSENTIAL HYPERTENSION: ICD-10-CM

## 2021-01-19 DIAGNOSIS — I25.10 CORONARY ARTERY DISEASE INVOLVING NATIVE CORONARY ARTERY OF NATIVE HEART WITHOUT ANGINA PECTORIS: ICD-10-CM

## 2021-01-19 DIAGNOSIS — N39.41 URGE INCONTINENCE: ICD-10-CM

## 2021-01-19 DIAGNOSIS — F03.91 DEMENTIA WITH BEHAVIORAL DISTURBANCE, UNSPECIFIED DEMENTIA TYPE: Primary | ICD-10-CM

## 2021-01-19 DIAGNOSIS — E78.5 HYPERLIPIDEMIA, UNSPECIFIED HYPERLIPIDEMIA TYPE: ICD-10-CM

## 2021-01-19 DIAGNOSIS — F41.9 ANXIETY: ICD-10-CM

## 2021-01-19 PROCEDURE — 99490 CHRNC CARE MGMT STAFF 1ST 20: CPT | Performed by: NURSE PRACTITIONER

## 2021-01-19 RX ORDER — LORAZEPAM 1 MG/1
1 TABLET ORAL EVERY 6 HOURS PRN
COMMUNITY

## 2021-01-19 RX ORDER — BUPROPION HYDROCHLORIDE 150 MG/1
150 TABLET ORAL EVERY MORNING
COMMUNITY

## 2021-01-19 RX ORDER — BUSPIRONE HYDROCHLORIDE 5 MG/1
5 TABLET ORAL 2 TIMES DAILY
COMMUNITY

## 2021-01-19 NOTE — PROGRESS NOTES
Sriram Vides is a 68 y.o. female who presents today for her medical conditions/complaints as noted below. Chief Complaint   Patient presents with    3 Month Follow-Up     Follow up on chronic health conditions           HPI:     Quincy Dalton is seen today for follow-up on her chronic health conditions including dementia, hypertension, hypothyroidism, CAD, celiac disease, and weight loss. She is seen in her room and she is quiet although she answers questions appropriately today. She states she is not having any pain and has been sleeping well. She does not provide much insight to her health or review of systems today. Noted scratch on bridge of nose and has had a recent fall but otherwise no other injuries. No recent labs other than UA at the end of November which this was treated. Has had a dramatic weight loss of 25 pounds over the last 3 months but was treated for COVID-19. Does tend to pace and wander and has poor appetite. We have added interventions for this but it fluctuates whether or not she takes this. Is having some difficulty sleeping at night and has interventions in place such as Ativan or quetiapine for restlessness or agitation. Am reluctant to add any further interventions. Blood pressure is well controlled currently. We will continue to monitor for falls.       Past Medical History:   Diagnosis Date    Anxiety     Arthritis     Bleeding disorder ([address]     CAD (coronary artery disease)     Stent in 1st diagonal in 2007    Cataract     Celiac disease     Dementia ([address]     Depression     Fibromyalgia     Hearing impairment     HTN (hypertension)     Hyperlipemia     MI (myocardial infarction) ([address] 2003    Osteoporosis     Pernicious anemia     Pneumonia     Thyroid disease     Urinary incontinence     Vitamin B12 deficiency     Vitamin K deficiency       Past Surgical History:   Procedure Laterality Date    APPENDECTOMY  1963    BLADDER REPAIR      x2 [facility] [address]    COLONOSCOPY  2004, 2014, 2015    CORONARY ANGIOPLASTY WITH STENT PLACEMENT  5/2007    HYSTERECTOMY  1964    KNEE ARTHROSCOPY Left 07/19/2016    Dr Faria [place] HISTORY  5-16-14    Colonoscopy  2. [facility] Hemorrhoidpexy    TONSILLECTOMY  as a child     TOTAL KNEE ARTHROPLASTY      UPPER GASTROINTESTINAL ENDOSCOPY  2004       Family History   Problem Relation Age of Onset    Cancer Mother         liver, lung    Asthma Father        Social History     Tobacco Use    Smoking status: Never Smoker    Smokeless tobacco: Never Used   Substance Use Topics    Alcohol use: No      Allergies   Allergen Reactions    Iodides Hives    Iodine Hives    Morphine Hives    Motrin [Ibuprofen Micronized] Hives    Percocet [Oxycodone-Acetaminophen]      Pt report \"makes me Short of breath\"        Health Maintenance   Topic Date Due    Hepatitis C screen  1940    Shingles Vaccine (1 of 2) 05/30/1992    Annual Wellness Visit (AWV)  05/29/2019    Flu vaccine (1) 09/01/2020    TSH testing  06/13/2021    Lipid screen  06/17/2021    DTaP/Tdap/Td vaccine (2 - Td) 08/06/2026    DEXA (modify frequency per FRAX score)  Completed    Pneumococcal 65+ years Vaccine  Completed    Hepatitis A vaccine  Aged Out    Hepatitis B vaccine  Aged Out    Hib vaccine  Aged Out    Meningococcal (ACWY) vaccine  Aged Out       Subjective:      Review of Systems   Unable to perform ROS: Dementia       Objective:     Physical Exam  Vitals signs and nursing note reviewed. Constitutional:       General: She is not in acute distress. Appearance: Normal appearance. She is underweight. She is ill-appearing (chronically). HENT:      Head: Normocephalic and atraumatic. Right Ear: Hearing normal.      Left Ear: Hearing normal.      Nose: Nose normal.   Eyes:      General: Lids are normal.   Neck:      Musculoskeletal: Normal range of motion and neck supple.    Cardiovascular: 6. Coronary artery disease involving native coronary artery of native heart without angina pectoris -no signs and symptoms of chest pain. Continue to monitor symptomatically. 7. Other specified hypothyroidism -no recent labs. Recent weight loss. Continue to administer current dose of levothyroxine and will order labs for next week. 8. Hyperlipidemia, unspecified hyperlipidemia type -can continue current dose of Lipitor and lab monitoring. 9. Urge incontinence -no recent UTI. Continue to monitor. Resident has HLD, dementia, CAD, HTN, Hypothyroidism and it is expected to last 15 or more months. These chronic conditions place resident at a significant risk of death, acute exacerbation, or functional decline. The patient's comprehensive plan was revised and monitored today. I spent 20 minutes reviewing plan. Patient seen and examined. History partially obtained by chart review and nursing notes. I have reviewed patient's past medical, surgical, social, and family history and have made updates where appropriate.   See facility EMR for updated medication list.       Electronically signed by CATRINA Awan CNP on 1/19/2021 at 10:05 AM

## 2022-09-08 NOTE — DISCHARGE SUMMARY
Hospitalist Discharge Summary        Patient: Nelida William  YOB: 1940  MRN: [id number]   Acct: [de-identified]    Primary Care Physician: CATRINA Drummond CNP    Admit date  7/13/2020    Discharge date:  7/15/2020 1:21 PM    Chief Complaint on presentation :-  Confusion    Discharge Assessment and Plan:-     1. Delirium on top of dementia: Patient has cognitive decline since early this year after her  passed-seen by the psychiatrist, patient received a drug holiday to see if there is any improvement. Patient was on scheduled Seroquel, Ativan, Abilify, oxybutynin and trazodone. Medications adjusted and changed per psychiatrist.  See medication list below. Psychiatry recommended discontinuing Haldol and Zanaflex on discharge and decreasing Seroquel to PRN. Continue Cymbalta and memantine at discharge without restarting any other psychotropics or oxybutynin. 2. Multiple falls secondary to above-has chronic compression fractures on T4 and T12-, patient comfortable with Tylenol. 3. Hypertension: History, stable. Currently normotensive. Continue amlodipine at discharge. 4. Hyperlipidemia: Stable. 5. Hypothyroidism: History, stable. Continue Synthroid at discharge. 6. Severe malnutrition-dietary evaluated  7. Obesity: BMI 30.3. Initial H and P and Hospital course:-  Angela Tavera is a 28-year-old  female, lifetime non-smoker, with a medical history of anxiety, arthritis, coronary artery disease s/p stent placement in 2007, cataracts, celiac disease, dementia, depression, fibromyalgia, hearing impairment, hypertension, hyperlipidemia, s/p MI in 2003, osteoporosis, pernicious anemia, thyroid disease, urinary incontinence, vitamin B12 deficiency, vitamin K deficiency, and obesity. Patient presented [facility] ER with complaints of multiple falls.   Patient has a significant psych history and was recently in a . [address] unit for a total of 5 weeks [facility] Patient had multiple episodes of suicidal ideations and was found acting up on them with a gown wrapped around her neck. Patient was recently sent to [facility] memory unit where she had multiple falls and had to come back to the nursing home. Per the patient daughter's report, the patient was up and walking around and able to have a conversation the last time she saw her. The patient's daughter also reported that since she had been in the [facility] psych unit, the patient had not been able to have a conversation and that she believed that the patient was on too many medications. The hospitalist service was contacted for further evaluation, treatment, and management. During the patient's stay, she was evaluated for her falls and psychiatry evaluated the patient to identify potential medication causing reasons for the falls. The patient's medication profile was analyzed and readjusted per the psychiatrist and the patient had a good response to these adjustments. At the time of discharge, the patient appears well and was alert and responding to questions and conversations. She remained hemodynamically stable and denied any complaints of chest pain, shortness of breath, nausea, vomiting, abdominal pain, diarrhea, constipation, urinary complaints, lightheadedness, dizziness, headaches, changes in vision, fever, or chills. She was updated on the discharge plan of care, she verbalized her understanding, and had no other needs or questions at this time.     Physical Exam:-  Vitals:   Patient Vitals for the past 24 hrs:   BP Temp Temp src Pulse Resp SpO2   07/15/20 1306 -- -- -- -- -- 98 %   07/15/20 0930 106/60 98 °F (36.7 °C) Oral 69 16 95 %   07/15/20 0245 (!) 109/55 98.7 °F (37.1 °C) Axillary 55 16 90 %   07/14/20 2045 (!) 114/57 97.9 °F (36.6 °C) Oral 59 16 95 %   07/14/20 1539 118/72 98 °F (36.7 °C) Oral 60 16 96 %     Weight:   Weight: 140 lb (63.5 kg)   24 hour intake/output: Intake/Output Summary (Last 24 hours) at 7/15/2020 1321  Last data filed at 7/15/2020 1320  Gross per 24 hour   Intake 1010 ml   Output --   Net 1010 ml     1. General appearance: No apparent distress, appears stated age and cooperative. 2. HEENT: Normal cephalic, atraumatic without obvious deformity. Pupils equal, round, and reactive to light. Extra ocular muscles intact. Conjunctivae/corneas clear. 3. Neck: Supple, with full range of motion. No jugular venous distention. Trachea midline. 4. Respiratory:  Normal respiratory effort. Clear to auscultation, bilaterally without Rales/Wheezes/Rhonchi. 5. Cardiovascular: Regular rate and rhythm with normal S1/S2 without murmurs, rubs or gallops. 6. Abdomen: Soft, non-tender, non-distended with normal bowel sounds. 7. Musculoskeletal:  No clubbing, cyanosis or edema bilaterally. 8. Skin: Skin color, texture, turgor normal.  No rashes or lesions. 9. Neurologic:  Neurovascularly intact without any focal sensory/motor deficits. Cranial nerves: II-XII intact, grossly non-focal.  10. Psychiatric: Alert and oriented, thought content appropriate, normal insight  11. Capillary Refill: Brisk,< 3 seconds   12.  Peripheral Pulses: +2 palpable, equal bilaterally     Discharge Medications:-      Medication List      START taking these medications    memantine 5 MG tablet  Commonly known as:  NAMENDA  Take 1 tablet by mouth 2 times daily  Replaces:  Namenda XR 14 MG Cp24 extended release capsule        CHANGE how you take these medications    QUEtiapine 25 MG tablet  Commonly known as:  SEROquel  Take 0.5 tablets by mouth nightly as needed for Agitation  What changed:    · how much to take  · when to take this  · reasons to take this        CONTINUE taking these medications    acetaminophen 325 MG tablet  Commonly known as:  TYLENOL  2 tabs every 6 hours prn     amLODIPine 5 MG tablet  Commonly known as:  NORVASC     atorvastatin 40 MG tablet  Commonly known as: Bill For Surgical Tray: no LIPITOR  TAKE 1 TABLET NIGHTLY     DULoxetine 30 MG extended release capsule  Commonly known as:  Cymbalta  Take 2 capsules by mouth daily     Handicap Placard Misc  by Does not apply route Expires 6/12/2023     levothyroxine 25 MCG tablet  Commonly known as:  SYNTHROID  TAKE 1 TABLET DAILY     loperamide 2 MG capsule  Commonly known as:  IMODIUM     LORazepam 1 MG tablet  Commonly known as:  ATIVAN     nystatin 924979 UNIT/GM powder  Generic drug:  nystatin     polyethylene glycol 17 g packet  Commonly known as:  GLYCOLAX     potassium chloride 20 MEQ extended release tablet  Commonly known as:  KLOR-CON M  TAKE 1 TABLET DAILY     raloxifene 60 MG tablet  Commonly known as:  EVISTA  TAKE 1 TABLET DAILY     vitamin B-12 500 MCG tablet  Commonly known as:  CYANOCOBALAMIN  Take 1 tablet by mouth daily        STOP taking these medications    ARIPiprazole 2 MG tablet  Commonly known as:  ABILIFY     haloperidol 5 MG tablet  Commonly known as:  HALDOL     Namenda XR 14 MG Cp24 extended release capsule  Generic drug:  memantine ER  Replaced by:  memantine 5 MG tablet     oxybutynin 10 MG extended release tablet  Commonly known as:  DITROPAN-XL     tiZANidine 4 MG tablet  Commonly known as:  ZANAFLEX     traZODone 100 MG tablet  Commonly known as:  DESYREL           Where to Get Your Medications      These medications were sent to [id number] - [place], [place] - [address] [address] [address] - P [phone] - F [phone]  [address] [facility] [address]    Phone:  [phone]   · memantine 5 MG tablet  · QUEtiapine 25 MG tablet          Labs :-  Recent Results (from the past 72 hour(s))   EKG 12 Lead    Collection Time: 07/13/20  7:51 AM   Result Value Ref Range    Ventricular Rate 57 BPM    Atrial Rate 57 BPM    P-R Interval 140 ms    QRS Duration 86 ms    Q-T Interval 440 ms    QTc Calculation (Bazett) 428 ms    P Axis 53 degrees    R Axis -18 degrees    T Axis 34 degrees   Basic Metabolic Panel    Collection Time: 07/13/20  9:40 AM   Result Value Ref Range    Sodium 142 135 - 145 meq/L    Potassium 4.5 3.5 - 5.2 meq/L    Chloride 107 98 - 111 meq/L    CO2 22 (L) 23 - 33 meq/L    Glucose 89 70 - 108 mg/dL    BUN 14 7 - 22 mg/dL    CREATININE 0.5 0.4 - 1.2 mg/dL    Calcium 8.7 8.5 - 10.5 mg/dL   CBC auto differential    Collection Time: 07/13/20  9:40 AM   Result Value Ref Range    WBC 7.4 4.8 - 10.8 thou/mm3    RBC 3.93 (L) 4.20 - 5.40 mill/mm3    Hemoglobin 13.2 12.0 - 16.0 gm/dl    Hematocrit 40.9 37.0 - 47.0 %    .1 (H) 81.0 - 99.0 fL    MCH 33.6 (H) 26.0 - 33.0 pg    MCHC 32.3 32.2 - 35.5 gm/dl    RDW-CV 12.8 11.5 - 14.5 %    RDW-SD 49.3 (H) 35.0 - 45.0 fL    Platelets 029 483 - 560 thou/mm3    MPV 11.1 9.4 - 12.4 fL    Seg Neutrophils 65.2 %    Lymphocytes 25.4 %    Monocytes 7.4 %    Eosinophils 1.2 %    Basophils 0.5 %    Immature Granulocytes 0.3 %    Segs Absolute 4.8 1.8 - 7.7 thou/mm3    Lymphocytes Absolute 1.9 1.0 - 4.8 thou/mm3    Monocytes Absolute 0.5 0.4 - 1.3 thou/mm3    Eosinophils Absolute 0.1 0.0 - 0.4 thou/mm3    Basophils Absolute 0.0 0.0 - 0.1 thou/mm3    Immature Grans (Abs) 0.02 0.00 - 0.07 thou/mm3    nRBC 0 /100 wbc   Anion Gap    Collection Time: 07/13/20  9:40 AM   Result Value Ref Range    Anion Gap 13.0 8.0 - 16.0 meq/L   Glomerular Filtration Rate, Estimated    Collection Time: 07/13/20  9:40 AM   Result Value Ref Range    Est, Glom Filt Rate >90 ml/min/1.73m2   Osmolality    Collection Time: 07/13/20  9:40 AM   Result Value Ref Range    Osmolality Calc 283.1 275.0 - 300 mOsmol/kg   CBC    Collection Time: 07/14/20  6:40 AM   Result Value Ref Range    WBC 6.9 4.8 - 10.8 thou/mm3    RBC 3.56 (L) 4.20 - 5.40 mill/mm3    Hemoglobin 11.9 (L) 12.0 - 16.0 gm/dl    Hematocrit 37.5 37.0 - 47.0 %    .3 (H) 81.0 - 99.0 fL    MCH 33.4 (H) 26.0 - 33.0 pg    MCHC 31.7 (L) 32.2 - 35.5 gm/dl    RDW-CV 12.8 11.5 - 14.5 %    RDW-SD 50.1 (H) 35.0 - 45.0 fL    Platelets 345 224 - 305 thou/mm3    MPV Expected Date Of Service: 09/08/2022 11.0 9.4 - 12.4 fL   Basic Metabolic Panel w/ Reflex to MG    Collection Time: 07/14/20  6:40 AM   Result Value Ref Range    Sodium 138 135 - 145 meq/L    Potassium reflex Magnesium 4.0 3.5 - 5.2 meq/L    Chloride 107 98 - 111 meq/L    CO2 21 (L) 23 - 33 meq/L    Glucose 84 70 - 108 mg/dL    BUN 10 7 - 22 mg/dL    CREATININE 0.4 0.4 - 1.2 mg/dL    Calcium 8.3 (L) 8.5 - 10.5 mg/dL   Anion Gap    Collection Time: 07/14/20  6:40 AM   Result Value Ref Range    Anion Gap 10.0 8.0 - 16.0 meq/L   Glomerular Filtration Rate, Estimated    Collection Time: 07/14/20  6:40 AM   Result Value Ref Range    Est, Glom Filt Rate >90 ml/min/1.73m2        Microbiology:    Blood culture #1:   Lab Results   Component Value Date    BC No growth-preliminary  No growth   12/02/2017       Blood culture #2:No results found for: Tamera Yeung    Organism:  No results found for: LABGRAM    MRSA culture only:No results found for: [facility]    Urine culture:   Lab Results   Component Value Date    LABURIN [place] count: >100,000 CFU/mL 10/08/2018     Lab Results   Component Value Date    ORG Mixed Growth     05/28/2020        Respiratory culture: No results found for: CULTRESP    Aerobic and Anaerobic :  No results found for: LABAERO  No results found for: LABANAE    Urinalysis:      Lab Results   Component Value Date    NITRU NEGATIVE 07/06/2020    WBCUA 2-4 06/13/2020    BACTERIA NONE SEEN 06/13/2020    RBCUA NONE SEEN 06/13/2020    BLOODU NEGATIVE 07/06/2020    SPECGRAV 1.025 10/21/2019    GLUCOSEU NEGATIVE 07/06/2020       Radiology:-  Xr Chest Standard (2 Vw)    Result Date: 7/13/2020  PROCEDURE: XR CHEST (2 VW) CLINICAL INFORMATION: 79-year-old female who fell. COMPARISON: Chest x-ray 07/06/2020. TECHNIQUE: AP and lateral views of the chest were obtained. FINDINGS: The lungs are clear. The cardiac silhouette and pulmonary vasculature are within normal limits. There is no significant pleural effusion or pneumothorax.  Visualized portions of the upper Billing Type: Third-Party Bill abdomen are within normal limits. There is generalized osteopenia. There appears to be a superior endplate deformity of the T11 vertebral body. Superior endplate deformity of the T11 vertebral body. **This report has been created using voice recognition software. It may contain minor errors which are inherent in voice recognition technology. ** Final report electronically signed by Dr Laurier Landau on 7/13/2020 8:44 AM    Xr Thoracic Spine (3 Views)    Result Date: 7/13/2020  PROCEDURE: XR THORACIC SPINE (3 VIEWS) CLINICAL INFORMATION: fall. COMPARISON: No prior study. TECHNIQUE: AP, lateral and swimmer's view FINDINGS: There is a superior endplate deformity of N64 vertebral body. There is generalized osteopenia. No additional fractures are identified. There are vascular calcifications in the abdominal aorta. Superior endplate deformity of the T11 vertebral body. **This report has been created using voice recognition software. It may contain minor errors which are inherent in voice recognition technology. ** Final report electronically signed by Dr Laurier Landau on 7/13/2020 8:47 AM    Ct Thoracic Spine Wo Contrast    Result Date: 7/13/2020  PROCEDURE: CT THORACIC SPINE WO CONTRAST CLINICAL INFORMATION: Evaluate for compression fracture T8. [facility] at nursing home. Dementia. COMPARISON: Radiographs from the same date and CT thoracic spine dated 7/31/2018. TECHNIQUE: 3 mm axial noncontrast CT images were obtained to the thoracic spine. Sagittal and coronal reconstructions were created. All CT scans at this facility use dose modulation, iterative reconstruction, and/or weight-based dosing when appropriate to reduce radiation dose to as low as reasonably achievable. FINDINGS:  There is mild scalloping of the superior endplate of T3 with approximately 10% central height loss which has appeared in the interval since 2018.  There is anterior wedge shape configuration of the T4 vertebral body with approximately 30% Performing Laboratory: 0

## 2024-01-01 NOTE — PROGRESS NOTES
Visit Information    Have you changed or started any medications since your last visit including any over-the-counter medicines, vitamins, or herbal medicines? no   Are you having any side effects from any of your medications? -  no  Have you stopped taking any of your medications? Is so, why? -  no    Have you seen any other physician or provider since your last visit? Yes - Records Obtained  Have you had any other diagnostic tests since your last visit? No  Have you been seen in the emergency room and/or had an admission to a hospital since we last saw you? Yes - Records Obtained  Have you had your routine dental cleaning in the past 6 months? no    Have you activated your [employer] account? If not, what are your barriers?  No: Declined     Patient Care Team:  Litzy Garcia DO as PCP - General (Family Medicine)        Health Maintenance   Topic Date Due    Flu vaccine (1) 09/01/2017    Lipid screen  11/09/2019    Colon cancer screen colonoscopy  10/27/2025    DTaP/Tdap/Td vaccine (2 - Td) 08/06/2026    Zostavax vaccine  Completed    DEXA (modify frequency per FRAX score)  Completed    Pneumococcal low/med risk  Completed
11.02\" (1.499 m)   Wt 145 lb 6.4 oz (66 kg)   BMI 29.35 kg/m²   She appears well; non-toxic and in no apparent distress. Physical Examination: Chest - clear to auscultation, no wheezes, rales or rhonchi, symmetric air entry  Heart - normal rate, regular rhythm, normal S1, S2, no murmurs, rubs, clicks or gallops  Lungs - clear to auscultation, no wheezes, rales or rhonchi, symmetric air entry. Abdomen - soft, nontender, nondistended, no masses or organomegaly, no CVA tenderness  Extremities - peripheral pulses normal, no pedal edema, no clubbing or cyanosis  Psych -  Affect appropriate. Thought process is normal without evidence of depression or psychosis. Good insight and appropriae interaction. Cognition and memory appear to be intact. ASSESSMENT & PLAN  Carine Barreto was seen today for urinary tract infection. Diagnoses and all orders for this visit:    Dysuria  -     Urine Culture; Future  -     sulfamethoxazole-trimethoprim (BACTRIM DS) 800-160 MG per tablet; Take 1 tablet by mouth 2 times daily for 5 days        Return if symptoms worsen or fail to improve. Will treat with Bactrim. Urine culture from  reviewed, likely continues to have sx due to not taking meds correctly. Advised on conservative treatment. Carine Barreto received counseling on the following healthy behaviors: medication adherence  Reviewed prior labs and health maintenance. Continue current medications, diet and exercise. Discussed use, benefit, and side effects of prescribed medications. Barriers to medication compliance addressed. Patient given educational materials - see patient instructions. All patient questions answered. Patient voiced understanding.
SCM